# Patient Record
Sex: MALE | Race: WHITE | Employment: FULL TIME | URBAN - METROPOLITAN AREA
[De-identification: names, ages, dates, MRNs, and addresses within clinical notes are randomized per-mention and may not be internally consistent; named-entity substitution may affect disease eponyms.]

---

## 2017-03-07 DIAGNOSIS — I10 BENIGN ESSENTIAL HYPERTENSION: ICD-10-CM

## 2017-03-07 RX ORDER — METOPROLOL SUCCINATE 25 MG/1
50 TABLET, EXTENDED RELEASE ORAL DAILY
Qty: 180 TABLET | Refills: 3 | Status: CANCELLED | OUTPATIENT
Start: 2017-03-07

## 2017-03-08 NOTE — TELEPHONE ENCOUNTER
Clinic Action Needed: Yes, please address change in dosage of metoprolol XL to 50mg daily. Please send Rx with new dosage to [facility], [place]. Please notify wife when done.   FNA Triage Call  Presenting Problem: Wife states dosage of patient's metoprolol XL was increased in Fall 2016 by Dr. Corbett from 25mg daily to 50mg daily. However, new directions never were sent to pharmacy so Rx has continued to be filled as 25mg daily. Now patient is running out of med early and insurance company is denying refill saying he should not be out yet.   Guideline Used:  Patient Recommendations/Teaching: Unable to locate note containing order to increase metoprolol XL to 50mg daily so unable to change these directions on the Rx without provider order. Wife asked that someone from clinic call her on Wed 3/8 in morning at phone [phone].  Torri Browning RN/FNA'    Routed to: [facility]

## 2017-03-08 NOTE — TELEPHONE ENCOUNTER
Spouse calls. No NARESH/CTC on file.  Informed we may take a message but cannot discuss care with her.  We asked her to have Mike call us. He works as .     When pt returns call, please offer office visit.  Dr. Corbett expected OV 6 weeks after 7/12/16 visit. (I think Saloni LYNCH missed that pt had only been here for one appointment when she refilled last time)  We will also inform pt we cannot discuss plan of care with spouse without a signed consent.    Per Dr. Corbett:   7/12/16 visit  Note: - metoprolol (TOPROL-XL) 25 MG 24 hr tablet; Take 1 tablet (25 mg) by mouth daily Dispense: 30 tablet; Refill: 1   Instructions        Return in about 6 weeks (around 8/23/2016) for Routine Visit, BP Recheck.        Rob Velásquez, RN

## 2017-03-10 ENCOUNTER — ALLIED HEALTH/NURSE VISIT (OUTPATIENT)
Dept: NURSING | Facility: CLINIC | Age: 47
End: 2017-03-10
Payer: COMMERCIAL

## 2017-03-10 VITALS — SYSTOLIC BLOOD PRESSURE: 132 MMHG | DIASTOLIC BLOOD PRESSURE: 84 MMHG

## 2017-03-10 DIAGNOSIS — Z01.30 BLOOD PRESSURE CHECK: Primary | ICD-10-CM

## 2017-03-10 DIAGNOSIS — I10 BENIGN ESSENTIAL HYPERTENSION: ICD-10-CM

## 2017-03-10 PROCEDURE — 99207 ZZC NO CHARGE NURSE ONLY: CPT

## 2017-03-10 RX ORDER — METOPROLOL SUCCINATE 25 MG/1
25 TABLET, EXTENDED RELEASE ORAL DAILY
Qty: 90 TABLET | Refills: 0 | Status: SHIPPED | OUTPATIENT
Start: 2017-03-10 | End: 2017-03-20

## 2017-03-10 NOTE — TELEPHONE ENCOUNTER
Pt has been taking two metroprolol 25 mg tabs since 2 weeks after decreasing to 25.   Went back to 50 mg because of headaches on lower does.   Now out of pills and refill too soon.    OK 50 mg #30 t'd up?  He will follow up with pharmacy later tonight.   Rob Velásquez RN

## 2017-03-10 NOTE — TELEPHONE ENCOUNTER
Patient stopped in office to complete NARESH early today at .    Wife calling to checking on status.

## 2017-03-10 NOTE — TELEPHONE ENCOUNTER
metoprolol (TOPROL-XL) 25 MG 24 hr tablet    Patient and wife have both called numerous times to get this straigtned out.    Would like arefill at 50 mg as that is what he's been directed to take.    Will stop in to fill out NARESH so we can talk with his spouse.    She is calling now because he is at work and can't take calls.    He is now out of medications, this request was placed a few days ago.    Please process and call patient to confirm.

## 2017-03-10 NOTE — TELEPHONE ENCOUNTER
Patient is coming to office to fill out NARESH. He is out of medications and upset no one has called him yet about his refill.    Please LVM to confirm it's been sent to pharmacy.

## 2017-03-12 RX ORDER — METOPROLOL SUCCINATE 50 MG/1
50 TABLET, EXTENDED RELEASE ORAL DAILY
Qty: 30 TABLET | Refills: 0 | Status: SHIPPED | OUTPATIENT
Start: 2017-03-12 | End: 2017-03-20

## 2017-03-13 NOTE — TELEPHONE ENCOUNTER
Keron Corbett MD refilled  BP Readings from Last 2 Encounters:   03/10/17 132/84   09/05/16 130/80     Lab Results   Component Value Date    CR 0.82 07/12/2016     Lab Results   Component Value Date    POTASSIUM 4.1 07/12/2016     Prescription approved per [facility] Refill Protocol.  Esther Stoner RN, BSN

## 2017-03-20 ENCOUNTER — OFFICE VISIT (OUTPATIENT)
Dept: FAMILY MEDICINE | Facility: CLINIC | Age: 47
End: 2017-03-20
Payer: COMMERCIAL

## 2017-03-20 VITALS
TEMPERATURE: 98.2 F | WEIGHT: 311 LBS | HEIGHT: 68 IN | BODY MASS INDEX: 47.13 KG/M2 | RESPIRATION RATE: 10 BRPM | SYSTOLIC BLOOD PRESSURE: 134 MMHG | DIASTOLIC BLOOD PRESSURE: 83 MMHG | HEART RATE: 66 BPM

## 2017-03-20 DIAGNOSIS — I10 ESSENTIAL HYPERTENSION, BENIGN: Primary | ICD-10-CM

## 2017-03-20 DIAGNOSIS — E66.01 MORBID OBESITY DUE TO EXCESS CALORIES (H): ICD-10-CM

## 2017-03-20 PROCEDURE — 99214 OFFICE O/P EST MOD 30 MIN: CPT | Performed by: FAMILY MEDICINE

## 2017-03-20 RX ORDER — METOPROLOL SUCCINATE 50 MG/1
50 TABLET, EXTENDED RELEASE ORAL DAILY
Qty: 90 TABLET | Refills: 1 | Status: SHIPPED | OUTPATIENT
Start: 2017-03-20 | End: 2017-10-24

## 2017-03-20 NOTE — MR AVS SNAPSHOT
"              After Visit Summary   3/20/2017    Mike Schafer    MRN: [id number]           Patient Information     Date Of Birth          1970        Visit Information        Provider Department      3/20/2017 4:45 PM Keron Corbett MD [facility]        Today's Diagnoses     Essential hypertension, benign    -  1       Follow-ups after your visit        Your next 10 appointments already scheduled     Mar 20, 2017  4:45 PM CDT   SHORT with Keron Corbett MD   [facility] ([facility])    [address] [place] [id number] [phone]              Who to contact     If you have questions or need follow up information about today's clinic visit or your schedule please contact [facility] directly at [phone].  Normal or non-critical lab and imaging results will be communicated to you by MyChart, letter or phone within 4 business days after the clinic has received the results. If you do not hear from us within 7 days, please contact the clinic through MyChart or phone. If you have a critical or abnormal lab result, we will notify you by phone as soon as possible.  Submit refill requests through [facility] or call your pharmacy and they will forward the refill request to us. Please allow 3 business days for your refill to be completed.          Additional Information About Your Visit        MyChart Information     [facility] lets you send messages to your doctor, view your test results, renew your prescriptions, schedule appointments and more. To sign up, go to [email or web address] . Click on \"Log in\" on the left side of the screen, which will take you to the Welcome page. Then click on \"Sign up Now\" on the right side of the page.     You will be asked to enter the access code listed below, as well as some personal information. Please follow the directions to create your username and password.   " "  Your access code is: [email or web address]  Expires: 2017  3:01 PM     Your access code will  in 90 days. If you need help or a new code, please call your [facility] or [phone].        Care EveryWhere ID     This is your Care EveryWhere ID. This could be used by other organizations to access your [facility] medical records  [id number]        Your Vitals Were     Pulse Temperature Respirations Height BMI (Body Mass Index)       66 98.2  F (36.8  C) (Oral) 10 5' 8\" (1.727 m) 47.29 kg/m2        Blood Pressure from Last 3 Encounters:   17 134/83   03/10/17 132/84   16 130/80    Weight from Last 3 Encounters:   17 (!) 311 lb (141.1 kg)   16 (!) 305 lb 12.8 oz (138.7 kg)   16 (!) 316 lb (143.3 kg)              Today, you had the following     No orders found for display         Today's Medication Changes          These changes are accurate as of: 3/20/17  3:36 PM.  If you have any questions, ask your nurse or doctor.               These medicines have changed or have updated prescriptions.        Dose/Directions    metoprolol 50 MG 24 hr tablet   Commonly known as:  TOPROL-XL   This may have changed:  Another medication with the same name was removed. Continue taking this medication, and follow the directions you see here.   Used for:  Essential hypertension, benign   Changed by:  Keron Corbett MD        Dose:  50 mg   Take 1 tablet (50 mg) by mouth daily   Quantity:  90 tablet   Refills:  1            Where to get your medicines      These medications were sent to [id number] - [place] - [address]  [address] [address]     Phone:  [phone]     metoprolol 50 MG 24 hr tablet                Primary Care Provider    None Specified       No primary provider on file.        Thank you!     Thank you for choosing [facility]  for your care. Our goal is always to provide you with excellent care. Hearing back from our " patients is one way we can continue to improve our services. Please take a few minutes to complete the written survey that you may receive in the mail after your visit with us. Thank you!             Your Updated Medication List - Protect others around you: Learn how to safely use, store and throw away your medicines at www.disposemymeds.org.          This list is accurate as of: 3/20/17  3:36 PM.  Always use your most recent med list.                   Brand Name Dispense Instructions for use    albuterol 108 (90 BASE) MCG/ACT Inhaler    PROAIR HFA/PROVENTIL HFA/VENTOLIN HFA    1 Inhaler    Inhale 1-2 puffs into the lungs every 4 hours as needed for shortness of breath / dyspnea or wheezing       benzonatate 200 MG capsule    TESSALON    21 capsule    Take 1 capsule (200 mg) by mouth 3 times daily as needed for cough       metoprolol 50 MG 24 hr tablet    TOPROL-XL    90 tablet    Take 1 tablet (50 mg) by mouth daily

## 2017-03-20 NOTE — PROGRESS NOTES
"  SUBJECTIVE:                                                    Mike Schafer is a 46 year old male who presents to clinic today for the following health issues:      Medication Followup of metoprolol    Taking Medication as prescribed: yes    Side Effects:  None    Medication Helping Symptoms:  yes     Subjective:   Mike Schafer is a 46 year old male with hypertension.  Current Outpatient Prescriptions   Medication Sig Dispense Refill     metoprolol (TOPROL-XL) 50 MG 24 hr tablet Take 1 tablet (50 mg) by mouth daily 90 tablet 1     albuterol (PROAIR HFA, PROVENTIL HFA, VENTOLIN HFA) 108 (90 BASE) MCG/ACT inhaler Inhale 1-2 puffs into the lungs every 4 hours as needed for shortness of breath / dyspnea or wheezing 1 Inhaler 0     benzonatate (TESSALON) 200 MG capsule Take 1 capsule (200 mg) by mouth 3 times daily as needed for cough 21 capsule 0     [DISCONTINUED] metoprolol (TOPROL-XL) 50 MG 24 hr tablet Take 1 tablet (50 mg) by mouth daily 30 tablet 0     [DISCONTINUED] metoprolol (TOPROL-XL) 25 MG 24 hr tablet Take 1 tablet (25 mg) by mouth daily 90 tablet 0      Hypertension ROS: taking medications as instructed, no medication side effects noted, no TIA's, no chest pain on exertion, no dyspnea on exertion, no swelling of ankles.   New concerns: didn't do well on reduced metoprolol: went back to 50 mg.      REVIEW OF SYSTEMS    Generally has been otherwise feeling well until this episode. No problems with vision, hearing, dental or neck pain.Has hph airborne or ingestion allergy  No chest pain, palpitations, dyspnea, change in bowel habits, blood  in stool or dyspepsia.  No rashes, changing moles, weakness, lassitude or back problems.  No chronic issues . No dysuria  Patient not  a smoker. No problems with significant headaches.  Objective:   /83 (BP Location: Right arm, Patient Position: Chair, Cuff Size: Adult Large)  Pulse 66  Temp 98.2  F (36.8  C) (Oral)  Resp 10  Ht 5' 8\" (1.727 m)  Wt (!) " 311 lb (141.1 kg)  BMI 47.29 kg/m2   Appearance healthy, alert and cooperative.  General exam BP noted to be well controlled today in office, S1, S2 normal, no gallop, no murmur, chest clear, no JVD, no HSM, no edema.   Lab review: labs are reviewed, up to date and normal.     Assessment:    Hypertension well controlled.   Weight management plan: Specific weight management program called carbo reduction discussed and follow up in 3 months in clinic to re-evaluate.    (I10) Essential hypertension, benign  (primary encounter diagnosis)  Comment:   Plan: metoprolol (TOPROL-XL) 50 MG 24 hr tablet            (E66.01) Morbid obesity due to excess calories (H)  Comment:   Plan: the key to longterm bp reduction      Plan:   reviewed diet, exercise and weight control, recommended sodium restriction, cardiovascular risk and specific lipid/LDL goals reviewed.  Goal is to reduce 20 pounds over 4-6 months

## 2017-03-31 ENCOUNTER — OFFICE VISIT (OUTPATIENT)
Dept: FAMILY MEDICINE | Facility: CLINIC | Age: 47
End: 2017-03-31
Payer: COMMERCIAL

## 2017-03-31 VITALS
HEIGHT: 68 IN | RESPIRATION RATE: 12 BRPM | HEART RATE: 57 BPM | OXYGEN SATURATION: 96 % | TEMPERATURE: 98 F | DIASTOLIC BLOOD PRESSURE: 88 MMHG | WEIGHT: 307.9 LBS | SYSTOLIC BLOOD PRESSURE: 134 MMHG | BODY MASS INDEX: 46.66 KG/M2

## 2017-03-31 DIAGNOSIS — R10.13 ABDOMINAL PAIN, EPIGASTRIC: Primary | ICD-10-CM

## 2017-03-31 LAB
ALBUMIN UR-MCNC: ABNORMAL MG/DL
APPEARANCE UR: CLEAR
BACTERIA #/AREA URNS HPF: ABNORMAL /HPF
BASOPHILS # BLD AUTO: 0.1 10E9/L (ref 0–0.2)
BASOPHILS NFR BLD AUTO: 0.6 %
BILIRUB UR QL STRIP: ABNORMAL
COLOR UR AUTO: ABNORMAL
DIFFERENTIAL METHOD BLD: NORMAL
EOSINOPHIL # BLD AUTO: 0.2 10E9/L (ref 0–0.7)
EOSINOPHIL NFR BLD AUTO: 2.1 %
ERYTHROCYTE [DISTWIDTH] IN BLOOD BY AUTOMATED COUNT: 13.3 % (ref 10–15)
GLUCOSE UR STRIP-MCNC: NEGATIVE MG/DL
HCT VFR BLD AUTO: 42.3 % (ref 40–53)
HGB BLD-MCNC: 14.5 G/DL (ref 13.3–17.7)
HGB UR QL STRIP: NEGATIVE
KETONES UR STRIP-MCNC: NEGATIVE MG/DL
LEUKOCYTE ESTERASE UR QL STRIP: NEGATIVE
LYMPHOCYTES # BLD AUTO: 2.6 10E9/L (ref 0.8–5.3)
LYMPHOCYTES NFR BLD AUTO: 29.5 %
MCH RBC QN AUTO: 30 PG (ref 26.5–33)
MCHC RBC AUTO-ENTMCNC: 34.3 G/DL (ref 31.5–36.5)
MCV RBC AUTO: 87 FL (ref 78–100)
MONOCYTES # BLD AUTO: 0.6 10E9/L (ref 0–1.3)
MONOCYTES NFR BLD AUTO: 6.7 %
MUCOUS THREADS #/AREA URNS LPF: PRESENT /LPF
NEUTROPHILS # BLD AUTO: 5.3 10E9/L (ref 1.6–8.3)
NEUTROPHILS NFR BLD AUTO: 61.1 %
NITRATE UR QL: NEGATIVE
NON-SQ EPI CELLS #/AREA URNS LPF: ABNORMAL /LPF
PH UR STRIP: 5 PH (ref 5–7)
PLATELET # BLD AUTO: 293 10E9/L (ref 150–450)
RBC # BLD AUTO: 4.84 10E12/L (ref 4.4–5.9)
RBC #/AREA URNS AUTO: ABNORMAL /HPF (ref 0–2)
SP GR UR STRIP: >1.03 (ref 1–1.03)
URN SPEC COLLECT METH UR: ABNORMAL
UROBILINOGEN UR STRIP-ACNC: 1 EU/DL (ref 0.2–1)
WBC # BLD AUTO: 8.6 10E9/L (ref 4–11)
WBC #/AREA URNS AUTO: ABNORMAL /HPF (ref 0–2)

## 2017-03-31 PROCEDURE — 81001 URINALYSIS AUTO W/SCOPE: CPT | Performed by: FAMILY MEDICINE

## 2017-03-31 PROCEDURE — 85025 COMPLETE CBC W/AUTO DIFF WBC: CPT | Performed by: FAMILY MEDICINE

## 2017-03-31 PROCEDURE — 36415 COLL VENOUS BLD VENIPUNCTURE: CPT | Performed by: FAMILY MEDICINE

## 2017-03-31 PROCEDURE — 99214 OFFICE O/P EST MOD 30 MIN: CPT | Performed by: FAMILY MEDICINE

## 2017-03-31 NOTE — NURSING NOTE
"Chief Complaint   Patient presents with     Abdominal Pain       Initial BP (!) 134/93 (BP Location: Right arm, Patient Position: Chair, Cuff Size: Adult Large)  Pulse 57  Temp 98  F (36.7  C) (Oral)  Resp 12  Ht 5' 8\" (1.727 m)  Wt (!) 307 lb 14.4 oz (139.7 kg)  SpO2 96%  BMI 46.82 kg/m2 Estimated body mass index is 46.82 kg/(m^2) as calculated from the following:    Height as of this encounter: 5' 8\" (1.727 m).    Weight as of this encounter: 307 lb 14.4 oz (139.7 kg).  Medication Reconciliation: complete   Gregg Alicia CMA       "

## 2017-03-31 NOTE — PROGRESS NOTES
SUBJECTIVE:                                                    Mike Schafer is a 46 year old male who presents to clinic today for the following health issues:      Abdominal Pain      Duration: 6 days    Description (location/character/radiation): upper stomach pain       Associated flank pain: yes    Intensity:  2-5/10    Accompanying signs and symptoms:        Fever/Chills: no        Gas/Bloating: YES       Nausea/vomitting: no        Diarrhea: no        Dysuria or Hematuria: no     History (previous similar pain/trauma/previous testing): no    Precipitating or alleviating factors:       Pain worse with eating/BM/urination: for 2 days eating made it worse       Pain relieved by BM: no     Therapies tried and outcome: antacid    LMP:  not applicable     Past Medical History:   Diagnosis Date     Hypertension        History reviewed. No pertinent surgical history.    Family History   Problem Relation Age of Onset     Breast Cancer Mother      DIABETES Father      Other Cancer Maternal Grandmother      Other Cancer Paternal Grandfather        Social History   Substance Use Topics     Smoking status: Never Smoker     Smokeless tobacco: Not on file     Alcohol use 0.0 oz/week     0 Standard drinks or equivalent per week        REVIEW OF SYSTEMS    Generally has been previously feeling well until this episode. No problems with vision, hearing, dental or neck pain.Has hph airborne or ingestion allergy  No chest pain, palpitations, dyspnea, change in bowel habits, blood  in stool or dyspepsia.  No rashes, changing moles, weakness, lassitude or back problems.  No chronic issues . No dysuria  Patient not  a smoker. No problems with significant headaches.  On exam the vital signs are stable  Weight is Body mass index is 46.82 kg/(m^2).   Eyes show jessie  No neck masses or thyromegaly.Ear nose and throat shows normal   No bruits, murmers, rubs or extrasounds. No cardiomegaly or chest wall tenderness. Lungs clear, no  abdominal masses or organomegaly. No CVA tenderness.  Skin eval no rash, tender in right epigastrium, no guarding  No hernias, good range of motion neck, back and extremities. No abnormal skin lesions. Normal genitalia. Good peripheral pulses. No adenopathy.  Normal gait and stance. Neck is supple.  Back exam shows good rom     (R10.13) Abdominal pain, epigastric  (primary encounter diagnosis)  Comment: Plan: US Abdomen Limited, CBC with platelets and         differential, *UA reflex to Microscopic and         Culture ([facility] (except         [place] and [place]), Urine Microscopic        Viral vs choledocal  Rule out cholelithiasis

## 2017-03-31 NOTE — MR AVS SNAPSHOT
"              After Visit Summary   3/31/2017    Mike Schafer    MRN: [id number]           Patient Information     Date Of Birth          1970        Visit Information        Provider Department      3/31/2017 4:30 PM Keron Corbett MD [facility]        Today's Diagnoses     Abdominal pain, epigastric    -  1       Follow-ups after your visit        Future tests that were ordered for you today     Open Future Orders        Priority Expected Expires Ordered    US Abdomen Limited Routine  3/31/2018 3/31/2017            Who to contact     If you have questions or need follow up information about today's clinic visit or your schedule please contact [facility] directly at [phone].  Normal or non-critical lab and imaging results will be communicated to you by MyChart, letter or phone within 4 business days after the clinic has received the results. If you do not hear from us within 7 days, please contact the clinic through MyChart or phone. If you have a critical or abnormal lab result, we will notify you by phone as soon as possible.  Submit refill requests through [facility] or call your pharmacy and they will forward the refill request to us. Please allow 3 business days for your refill to be completed.          Additional Information About Your Visit        MyChart Information     [facility] lets you send messages to your doctor, view your test results, renew your prescriptions, schedule appointments and more. To sign up, go to [email or web address] . Click on \"Log in\" on the left side of the screen, which will take you to the Welcome page. Then click on \"Sign up Now\" on the right side of the page.     You will be asked to enter the access code listed below, as well as some personal information. Please follow the directions to create your username and password.     Your access code is: [email or web address]  Expires: 2017  3:01 PM     Your access code will  in " "90 days. If you need help or a new code, please call your [facility] or [phone].        Care EveryWhere ID     This is your Care EveryWhere ID. This could be used by other organizations to access your [facility] medical records  [id number]        Your Vitals Were     Pulse Temperature Respirations Height Pulse Oximetry BMI (Body Mass Index)    57 98  F (36.7  C) (Oral) 12 5' 8\" (1.727 m) 96% 46.82 kg/m2       Blood Pressure from Last 3 Encounters:   03/31/17 134/88   03/20/17 134/83   03/10/17 132/84    Weight from Last 3 Encounters:   03/31/17 (!) 307 lb 14.4 oz (139.7 kg)   03/20/17 (!) 311 lb (141.1 kg)   09/05/16 (!) 305 lb 12.8 oz (138.7 kg)              We Performed the Following     *UA reflex to Microscopic and Culture ([place] [facility] (except [place] and [place])     CBC with platelets and differential        Primary Care Provider Office Phone # Fax #    Keron Corbett [phone]       [facility] [id number] [place] [address]        Thank you!     Thank you for choosing [facility]  for your care. Our goal is always to provide you with excellent care. Hearing back from our patients is one way we can continue to improve our services. Please take a few minutes to complete the written survey that you may receive in the mail after your visit with us. Thank you!             Your Updated Medication List - Protect others around you: Learn how to safely use, store and throw away your medicines at www.disposemymeds.org.          This list is accurate as of: 3/31/17  5:20 PM.  Always use your most recent med list.                   Brand Name Dispense Instructions for use    albuterol 108 (90 BASE) MCG/ACT Inhaler    PROAIR HFA/PROVENTIL HFA/VENTOLIN HFA    1 Inhaler    Inhale 1-2 puffs into the lungs every 4 hours as needed for shortness of breath / dyspnea or wheezing       benzonatate 200 MG capsule    TESSALON    21 " capsule    Take 1 capsule (200 mg) by mouth 3 times daily as needed for cough       metoprolol 50 MG 24 hr tablet    TOPROL-XL    90 tablet    Take 1 tablet (50 mg) by mouth daily

## 2017-04-14 ENCOUNTER — HOSPITAL ENCOUNTER (OUTPATIENT)
Dept: ULTRASOUND IMAGING | Facility: CLINIC | Age: 47
Discharge: HOME | End: 2017-04-14
Attending: FAMILY MEDICINE | Admitting: FAMILY MEDICINE
Payer: COMMERCIAL

## 2017-04-14 DIAGNOSIS — R10.13 ABDOMINAL PAIN, EPIGASTRIC: ICD-10-CM

## 2017-04-14 PROCEDURE — 76705 ECHO EXAM OF ABDOMEN: CPT

## 2017-04-20 ENCOUNTER — TELEPHONE (OUTPATIENT)
Dept: FAMILY MEDICINE | Facility: CLINIC | Age: 47
End: 2017-04-20

## 2017-04-20 NOTE — TELEPHONE ENCOUNTER
Spouse call for 4/14/17 imaging results.  NARESH/CTC on file.   Informed.  Spouse reports no change in symptoms. 3/31/17 Visit note reviewed:   Instructions        Return in about 1 week (around 4/7/2017), or if symptoms worsen or fail to improve.      Patient's first available due to work schedule after 3:00 PM Fridays. Scheduled 4/28/17 4:00 PM.   Rob Velásquez RN

## 2017-04-28 ENCOUNTER — OFFICE VISIT (OUTPATIENT)
Dept: FAMILY MEDICINE | Facility: CLINIC | Age: 47
End: 2017-04-28
Payer: COMMERCIAL

## 2017-04-28 VITALS
HEIGHT: 68 IN | OXYGEN SATURATION: 97 % | HEART RATE: 67 BPM | TEMPERATURE: 98.1 F | WEIGHT: 313.4 LBS | DIASTOLIC BLOOD PRESSURE: 84 MMHG | BODY MASS INDEX: 47.5 KG/M2 | SYSTOLIC BLOOD PRESSURE: 138 MMHG | RESPIRATION RATE: 14 BRPM

## 2017-04-28 DIAGNOSIS — I10 BENIGN ESSENTIAL HYPERTENSION: Primary | ICD-10-CM

## 2017-04-28 PROCEDURE — 36415 COLL VENOUS BLD VENIPUNCTURE: CPT | Performed by: FAMILY MEDICINE

## 2017-04-28 PROCEDURE — 80048 BASIC METABOLIC PNL TOTAL CA: CPT | Performed by: FAMILY MEDICINE

## 2017-04-28 PROCEDURE — 99213 OFFICE O/P EST LOW 20 MIN: CPT | Performed by: FAMILY MEDICINE

## 2017-04-28 NOTE — LETTER
[facility]  [address] [place], [place], [address]  [phone]      May 1, 2017    Mike Schafer   [phone]  [facility] [address]          Dear Mike,    The results of your recent tests were good.  Enclosed is a copy of the results.  It was a pleasure to see you at your last appointment.    Results for orders placed or performed in visit on 04/28/17   Basic metabolic panel  (Ca, Cl, CO2, Creat, Gluc, K, Na, BUN)   Result Value Ref Range    Sodium 140 133 - 144 mmol/L    Potassium 4.0 3.4 - 5.3 mmol/L    Chloride 106 94 - 109 mmol/L    Carbon Dioxide 28 20 - 32 mmol/L    Anion Gap 6 3 - 14 mmol/L    Glucose 80 70 - 99 mg/dL    Urea Nitrogen 13 7 - 30 mg/dL    Creatinine 0.80 0.66 - 1.25 mg/dL    GFR Estimate >90  Non  GFR Calc   >60 mL/min/1.7m2    GFR Estimate If Black >90   GFR Calc   >60 mL/min/1.7m2    Calcium 9.0 8.5 - 10.1 mg/dL       If you have any questions or concerns, please call myself or my nurse at [phone].          Sincerely,    Keron Corbett MD/MAURO

## 2017-04-28 NOTE — NURSING NOTE
"Chief Complaint   Patient presents with     Abdominal Pain       Initial /84 (BP Location: Left arm, Patient Position: Chair, Cuff Size: Adult Large)  Pulse 67  Temp 98.1  F (36.7  C) (Oral)  Resp 14  Ht 5' 8\" (1.727 m)  Wt (!) 313 lb 6.4 oz (142.2 kg)  SpO2 97%  BMI 47.65 kg/m2 Estimated body mass index is 47.65 kg/(m^2) as calculated from the following:    Height as of this encounter: 5' 8\" (1.727 m).    Weight as of this encounter: 313 lb 6.4 oz (142.2 kg).  Medication Reconciliation: complete   Gregg Alicia CMA       "

## 2017-04-28 NOTE — MR AVS SNAPSHOT
"              After Visit Summary   2017    Mike Schafer    MRN: [id number]           Patient Information     Date Of Birth          1970        Visit Information        Provider Department      2017 4:00 PM Keron Corbett MD [facility]        Today's Diagnoses     Benign essential hypertension    -  1       Follow-ups after your visit        Who to contact     If you have questions or need follow up information about today's clinic visit or your schedule please contact [facility] directly at [phone].  Normal or non-critical lab and imaging results will be communicated to you by MyChart, letter or phone within 4 business days after the clinic has received the results. If you do not hear from us within 7 days, please contact the clinic through [employer] or phone. If you have a critical or abnormal lab result, we will notify you by phone as soon as possible.  Submit refill requests through "[employer]" or call your pharmacy and they will forward the refill request to us. Please allow 3 business days for your refill to be completed.          Additional Information About Your Visit        MyChart Information     "[employer]" lets you send messages to your doctor, view your test results, renew your prescriptions, schedule appointments and more. To sign up, go to [email or web address]" . Click on \"Log in\" on the left side of the screen, which will take you to the Welcome page. Then click on \"Sign up Now\" on the right side of the page.     You will be asked to enter the access code listed below, as well as some personal information. Please follow the directions to create your username and password.     Your access code is: [email or web address]  Expires: 2017  3:01 PM     Your access code will  in 90 days. If you need help or a new code, please call your [facility] or [phone].        Care EveryWhere ID     This is your Care EveryWhere ID. This could be " "used by other organizations to access your [place] medical records  [id number]        Your Vitals Were     Pulse Temperature Respirations Height Pulse Oximetry BMI (Body Mass Index)    67 98.1  F (36.7  C) (Oral) 14 5' 8\" (1.727 m) 97% 47.65 kg/m2       Blood Pressure from Last 3 Encounters:   04/28/17 140/84   03/31/17 134/88   03/20/17 134/83    Weight from Last 3 Encounters:   04/28/17 (!) 313 lb 6.4 oz (142.2 kg)   03/31/17 (!) 307 lb 14.4 oz (139.7 kg)   03/20/17 (!) 311 lb (141.1 kg)              We Performed the Following     Basic metabolic panel  (Ca, Cl, CO2, Creat, Gluc, K, Na, BUN)        Primary Care Provider Office Phone # Fax #    Keron Corbett [phone]       [address] [address]        Thank you!     Thank you for choosing [facility]  for your care. Our goal is always to provide you with excellent care. Hearing back from our patients is one way we can continue to improve our services. Please take a few minutes to complete the written survey that you may receive in the mail after your visit with us. Thank you!             Your Updated Medication List - Protect others around you: Learn how to safely use, store and throw away your medicines at www.disposemymeds.org.          This list is accurate as of: 4/28/17  4:17 PM.  Always use your most recent med list.                   Brand Name Dispense Instructions for use    metoprolol 50 MG 24 hr tablet    TOPROL-XL    90 tablet    Take 1 tablet (50 mg) by mouth daily         "

## 2017-04-28 NOTE — PROGRESS NOTES
"  SUBJECTIVE:                                                    Mike Schafer is a 46 year old male who presents to clinic today for the following health issues:    /84 (BP Location: Left arm, Patient Position: Chair, Cuff Size: Adult Large)  Pulse 67  Temp 98.1  F (36.7  C) (Oral)  Resp 14  Ht 5' 8\" (1.727 m)  Wt (!) 313 lb 6.4 oz (142.2 kg)  SpO2 97%  BMI 47.65 kg/m2    Abdominal Pain      Duration: 3/17/17    Description (location/character/radiation): upper abdomen        Associated flank pain: yes    Intensity:  mild    Accompanying signs and symptoms:        Fever/Chills: no        Gas/Bloating: no        Nausea/vomitting: no        Diarrhea: no        Dysuria or Hematuria: no     History (previous similar pain/trauma/previous testing): no    Precipitating or alleviating factors:       Pain worse with eating/BM/urination: no       Pain relieved by BM: no     Therapies tried and outcome: None    LMP:  not applicable     Past Medical History:   Diagnosis Date     Hypertension        History reviewed. No pertinent surgical history.    Family History   Problem Relation Age of Onset     Breast Cancer Mother      DIABETES Father      Other Cancer Maternal Grandmother      Other Cancer Paternal Grandfather        Social History   Substance Use Topics     Smoking status: Never Smoker     Smokeless tobacco: Not on file     Alcohol use 0.0 oz/week     0 Standard drinks or equivalent per week   On exam the vital signs are stable  Weight is Body mass index is 47.65 kg/(m^2).   Eyes show jessie  No neck masses or thyromegaly.Ear nose and throat shows normal   No bruits, murmers, rubs or extrasounds. No cardiomegaly or chest wall tenderness. Lungs clear, no abdominal masses or organomegaly. No CVA tenderness.  Skin eval no rash   No hernias, good range of motion neck, back and extremities. No abnormal skin lesions. Normal genitalia. Good peripheral pulses. No adenopathy.  Normal gait and stance. Neck is " supple.  Back exam shows good rom , no abdominal tenderness    (I10) Benign essential hypertension  (primary encounter diagnosis)  Comment:   Plan: Basic metabolic panel  (Ca, Cl, CO2, Creat,         Gluc, K, Na, BUN)          Possible hydronephrosis, recheck after 6 weeks if renal function is normal

## 2017-04-29 LAB
ANION GAP SERPL CALCULATED.3IONS-SCNC: 6 MMOL/L (ref 3–14)
BUN SERPL-MCNC: 13 MG/DL (ref 7–30)
CALCIUM SERPL-MCNC: 9 MG/DL (ref 8.5–10.1)
CHLORIDE SERPL-SCNC: 106 MMOL/L (ref 94–109)
CO2 SERPL-SCNC: 28 MMOL/L (ref 20–32)
CREAT SERPL-MCNC: 0.8 MG/DL (ref 0.66–1.25)
GFR SERPL CREATININE-BSD FRML MDRD: NORMAL ML/MIN/1.7M2
GLUCOSE SERPL-MCNC: 80 MG/DL (ref 70–99)
POTASSIUM SERPL-SCNC: 4 MMOL/L (ref 3.4–5.3)
SODIUM SERPL-SCNC: 140 MMOL/L (ref 133–144)

## 2017-10-24 ENCOUNTER — OFFICE VISIT (OUTPATIENT)
Dept: FAMILY MEDICINE | Facility: CLINIC | Age: 47
End: 2017-10-24
Payer: COMMERCIAL

## 2017-10-24 VITALS
RESPIRATION RATE: 20 BRPM | DIASTOLIC BLOOD PRESSURE: 82 MMHG | OXYGEN SATURATION: 97 % | WEIGHT: 315 LBS | SYSTOLIC BLOOD PRESSURE: 133 MMHG | TEMPERATURE: 98 F | BODY MASS INDEX: 47.74 KG/M2 | HEIGHT: 68 IN | HEART RATE: 69 BPM

## 2017-10-24 DIAGNOSIS — I10 ESSENTIAL HYPERTENSION, BENIGN: ICD-10-CM

## 2017-10-24 DIAGNOSIS — H81.10 BENIGN PAROXYSMAL POSITIONAL VERTIGO, UNSPECIFIED LATERALITY: Primary | ICD-10-CM

## 2017-10-24 DIAGNOSIS — H61.23 BILATERAL IMPACTED CERUMEN: ICD-10-CM

## 2017-10-24 PROCEDURE — 99214 OFFICE O/P EST MOD 30 MIN: CPT | Performed by: FAMILY MEDICINE

## 2017-10-24 PROCEDURE — 69209 REMOVE IMPACTED EAR WAX UNI: CPT | Mod: 50 | Performed by: FAMILY MEDICINE

## 2017-10-24 RX ORDER — MECLIZINE HYDROCHLORIDE 25 MG/1
25 TABLET ORAL EVERY 6 HOURS PRN
Qty: 30 TABLET | Refills: 0 | Status: SHIPPED | OUTPATIENT
Start: 2017-10-24 | End: 2019-04-12

## 2017-10-24 RX ORDER — METOPROLOL SUCCINATE 50 MG/1
50 TABLET, EXTENDED RELEASE ORAL DAILY
Qty: 90 TABLET | Refills: 1 | Status: SHIPPED | OUTPATIENT
Start: 2017-10-24 | End: 2018-05-23

## 2017-10-24 NOTE — NURSING NOTE
"Chief Complaint   Patient presents with     Dizziness     vertigo since yesterday also left hearing loss       Initial /82 (BP Location: Right arm, Patient Position: Chair, Cuff Size: Adult Large)  Pulse 69  Temp 98  F (36.7  C) (Oral)  Resp 20  Ht 5' 8\" (1.727 m)  Wt (!) 317 lb (143.8 kg)  SpO2 97%  BMI 48.2 kg/m2 Estimated body mass index is 48.2 kg/(m^2) as calculated from the following:    Height as of this encounter: 5' 8\" (1.727 m).    Weight as of this encounter: 317 lb (143.8 kg).  Medication Reconciliation: complete     Poly Cook/ROLY  [facility]      "

## 2017-10-24 NOTE — MR AVS SNAPSHOT
After Visit Summary   10/24/2017    Mike Schafer    MRN: [id number]           Patient Information     Date Of Birth          1970        Visit Information        Provider Department      10/24/2017 4:00 PM Jaylin Flores MD [facility]        Today's Diagnoses     Benign paroxysmal positional vertigo, unspecified laterality    -  1    Essential hypertension, benign          Care Instructions      Follow up if symptoms persist or worsen   Benign Paroxysmal Positional Vertigo    Benign paroxysmal positional vertigo is a common condition. You feel as if the room is spinning after changing position, moving your head quickly, or even just rolling over in bed.  Vertigo is a false feeling of motion plus disorientation that makes it seem as though the room is spinning. A vertigo attack may cause sudden nausea, vomiting, and heavy sweating. Severe vertigo causes a loss of balance. You may even fall down.  Vertigo is caused by a problem with the inner ear. The inner ear is located behind the middle ear. It is a part of the balance center of the body. It contains small calcium particles within fluid-filled canals (semi-circular canals). These particles can move out of position as a result of aging, head trauma, or disease of the inner ear. Once that happens, moving your head in certain ways may cause the particles to stimulate the inner ear. This creates the feeling of vertigo.  An episode of vertigo may last seconds, minutes, or hours. Once you are over the first episode of vertigo, it may never return. Sometimes symptoms return off and on over several weeks or longer.  Home care  Follow these guidelines when caring for yourself at home:    Rest quietly in bed if your symptoms are severe. Change position slowly. There is usually one position that will feel best. This might be lying on one side or lying on your back with your head slightly raised on pillows.    Do not  drive or work with dangerous machinery for one week after symptoms disappear. This is in case symptoms return suddenly.    Take medicine as prescribed to relieve your symptoms. Unless another medicine was prescribed for nausea, vomiting, and vertigo, you may use over-the-counter motion sickness medicine, such as meclizine or dimenhydrinate.  Follow-up care  Follow up with your healthcare provider, or as directed. Tell your provider about any ringing in the ear or hearing loss.  If you had a CT or MRI scan, a specialist will review it. You will be told of any new findings that may affect your care.  When to seek medical advice  Call your healthcare provider right away if any of these occur:    Vertigo gets worse even after taking prescribed medicine    Repeated vomiting even after taking prescribed medicine    Increased weakness or fainting    Severe headache or unusual drowsiness or confusion    Weakness of an arm or leg or one side of the face    Difficulty walking    Difficulty with speech or vision    Seizure    Trouble hearing    Fever of 100.4 F (38 C) or higher, or as directed by your healthcare provider    Fast heart rate    Chest pain   Date Last Reviewed: 7/10/2015    3788-1058 [employer]. [address] [place] [address]. All rights reserved. This information is not intended as a substitute for professional medical care. Always follow your healthcare professional's instructions.                Follow-ups after your visit        Additional Services     PHYSICAL THERAPY REFERRAL       *This therapy referral will be filtered to a centralized scheduling office at [facility] and the patient will receive a call to schedule an appointment at a [facility] location most convenient for them. *     [facility] provides Physical Therapy evaluation and treatment and many specialty services across the [facility] system.  If requesting a specialty program,  "please choose from the list below.    If you have not heard from the scheduling office within 2 business days, please call [phone] for all locations, with the exception of [place], please call [phone].  Treatment: Evaluation & Treatment  Special Instructions/Modalities:  Special Programs: Balance/Vestibular    Please be aware that coverage of these services is subject to the terms and limitations of your health insurance plan.  Call member services at your health plan with any benefit or coverage questions.      **Note to Provider:  If you are referring outside of [facility] for the therapy appointment, please list the name of the location in the \"special instructions\" above, print the referral and give to the patient to schedule the appointment.                  Who to contact     If you have questions or need follow up information about today's clinic visit or your schedule please contact [facility] directly at [phone].  Normal or non-critical lab and imaging results will be communicated to you by MyChart, letter or phone within 4 business days after the clinic has received the results. If you do not hear from us within 7 days, please contact the clinic through MyChart or phone. If you have a critical or abnormal lab result, we will notify you by phone as soon as possible.  Submit refill requests through [email or web address] or call your pharmacy and they will forward the refill request to us. Please allow 3 business days for your refill to be completed.          Additional Information About Your Visit        [employer] Information     [email or web address] lets you send messages to your doctor, view your test results, renew your prescriptions, schedule appointments and more. To sign up, go to [email or web address] . Click on \"Log in\" on the left side of the screen, which will take you to the Welcome page. Then click on \"Sign up Now\" on the right side of the page.     You will be asked to enter the access code " "listed below, as well as some personal information. Please follow the directions to create your username and password.     Your access code is: [email or web address]  Expires: 2018  4:48 PM     Your access code will  in 90 days. If you need help or a new code, please call your [facility] or [phone].        Care EveryWhere ID     This is your Care EveryWhere ID. This could be used by other organizations to access your [place] medical records  [id number]        Your Vitals Were     Pulse Temperature Respirations Height Pulse Oximetry BMI (Body Mass Index)    69 98  F (36.7  C) (Oral) 20 5' 8\" (1.727 m) 97% 48.2 kg/m2       Blood Pressure from Last 3 Encounters:   10/24/17 133/82   17 138/84   17 134/88    Weight from Last 3 Encounters:   10/24/17 (!) 317 lb (143.8 kg)   17 (!) 313 lb 6.4 oz (142.2 kg)   17 (!) 307 lb 14.4 oz (139.7 kg)              We Performed the Following     PHYSICAL THERAPY REFERRAL          Today's Medication Changes          These changes are accurate as of: 10/24/17  4:49 PM.  If you have any questions, ask your nurse or doctor.               Start taking these medicines.        Dose/Directions    meclizine 25 MG tablet   Commonly known as:  ANTIVERT   Used for:  Benign paroxysmal positional vertigo, unspecified laterality   Started by:  Jaylin Flores MD        Dose:  25 mg   Take 1 tablet (25 mg) by mouth every 6 hours as needed for dizziness   Quantity:  30 tablet   Refills:  0            Where to get your medicines      These medications were sent to [id number] - [place], [place] - [address]  [address] [facility] [address]     Phone:  [phone]     meclizine 25 MG tablet    metoprolol 50 MG 24 hr tablet                Primary Care Provider Office Phone # Fax #    Keron Corbett [phone] [address] [facility] [address]        Equal Access to Services     ELISA MALAGON AH: Raven jensen " rashida Saucedo, watanda mandodc, qatiffanieta kagertrudis callahan, isidra theresain hayaaradha steenlawanda anayelikristen laalradha lucien. So [facility] [phone].    ATENCIÓN: Si habla español, tiene a orantes disposición servicios gratuitos de asistencia lingüística. Sivan al [phone].    We comply with applicable federal civil rights laws and [place] laws. We do not discriminate on the basis of race, color, national origin, age, disability, sex, sexual orientation, or gender identity.            Thank you!     Thank you for choosing [facility]  for your care. Our goal is always to provide you with excellent care. Hearing back from our patients is one way we can continue to improve our services. Please take a few minutes to complete the written survey that you may receive in the mail after your visit with us. Thank you!             Your Updated Medication List - Protect others around you: Learn how to safely use, store and throw away your medicines at www.disposemymeds.org.          This list is accurate as of: 10/24/17  4:49 PM.  Always use your most recent med list.                   Brand Name Dispense Instructions for use Diagnosis    meclizine 25 MG tablet    ANTIVERT    30 tablet    Take 1 tablet (25 mg) by mouth every 6 hours as needed for dizziness    Benign paroxysmal positional vertigo, unspecified laterality       metoprolol 50 MG 24 hr tablet    TOPROL-XL    90 tablet    Take 1 tablet (50 mg) by mouth daily    Essential hypertension, benign

## 2017-10-24 NOTE — PATIENT INSTRUCTIONS
Follow up if symptoms persist or worsen   Benign Paroxysmal Positional Vertigo    Benign paroxysmal positional vertigo is a common condition. You feel as if the room is spinning after changing position, moving your head quickly, or even just rolling over in bed.  Vertigo is a false feeling of motion plus disorientation that makes it seem as though the room is spinning. A vertigo attack may cause sudden nausea, vomiting, and heavy sweating. Severe vertigo causes a loss of balance. You may even fall down.  Vertigo is caused by a problem with the inner ear. The inner ear is located behind the middle ear. It is a part of the balance center of the body. It contains small calcium particles within fluid-filled canals (semi-circular canals). These particles can move out of position as a result of aging, head trauma, or disease of the inner ear. Once that happens, moving your head in certain ways may cause the particles to stimulate the inner ear. This creates the feeling of vertigo.  An episode of vertigo may last seconds, minutes, or hours. Once you are over the first episode of vertigo, it may never return. Sometimes symptoms return off and on over several weeks or longer.  Home care  Follow these guidelines when caring for yourself at home:    Rest quietly in bed if your symptoms are severe. Change position slowly. There is usually one position that will feel best. This might be lying on one side or lying on your back with your head slightly raised on pillows.    Do not drive or work with dangerous machinery for one week after symptoms disappear. This is in case symptoms return suddenly.    Take medicine as prescribed to relieve your symptoms. Unless another medicine was prescribed for nausea, vomiting, and vertigo, you may use over-the-counter motion sickness medicine, such as meclizine or dimenhydrinate.  Follow-up care  Follow up with your healthcare provider, or as directed. Tell your provider about any ringing in  the ear or hearing loss.  If you had a CT or MRI scan, a specialist will review it. You will be told of any new findings that may affect your care.  When to seek medical advice  Call your healthcare provider right away if any of these occur:    Vertigo gets worse even after taking prescribed medicine    Repeated vomiting even after taking prescribed medicine    Increased weakness or fainting    Severe headache or unusual drowsiness or confusion    Weakness of an arm or leg or one side of the face    Difficulty walking    Difficulty with speech or vision    Seizure    Trouble hearing    Fever of 100.4 F (38 C) or higher, or as directed by your healthcare provider    Fast heart rate    Chest pain   Date Last Reviewed: 7/10/2015    1435-6825 [employer]. [address] [place] [address], [place] [address]. All rights reserved. This information is not intended as a substitute for professional medical care. Always follow your healthcare professional's instructions.

## 2017-10-24 NOTE — PROGRESS NOTES
SUBJECTIVE:   Mike Schafer is a 47 year old male who presents to clinic today for the following health issues:      Dizziness  Onset: since yesterday     Description:   Do you feel faint:  YES  Does it feel like the surroundings (bed, room) are moving: YES  Unsteady/off balance: YES  Have you passed out or fallen: no     Intensity: moderate    Progression of Symptoms:  worsening    Accompanying Signs & Symptoms:  Heart palpitations: no   Nausea, vomiting: no   Weakness in arms or legs: YES  Fatigue: no   Vision or speech changes: no   Ringing in ears (Tinnitus): YES- left ear : feels plugged   Hearing Loss: YES---left ear    History:   Head trauma/concussion hx: no   Previous similar symptoms: no   Recent bleeding history: no     Precipitating factors:   Worse with activity or head movement: YES- head movement  Any new medications (BP?): no   Alcohol/drug abuse/withdrawal: no     Alleviating factors:   Does staying in a fixed position give relief:  no     Therapies Tried and outcome:     Had a crown placed on left side on Friday and that is when he noticed left ear feeling plugged.         Problem list and histories reviewed & adjusted, as indicated.  Additional history: as documented    Patient Active Problem List   Diagnosis     Benign essential hypertension     bmi over 40     No past surgical history on file.    Social History   Substance Use Topics     Smoking status: Never Smoker     Smokeless tobacco: Never Used     Alcohol use 0.0 oz/week     0 Standard drinks or equivalent per week     Family History   Problem Relation Age of Onset     Breast Cancer Mother      DIABETES Father      Other Cancer Maternal Grandmother      Other Cancer Paternal Grandfather              Reviewed and updated as needed this visit by clinical staffTobacco       Reviewed and updated as needed this visit by Provider         ROS:  Constitutional, HEENT, cardiovascular, neuro  systems are negative, except as otherwise  "noted.      OBJECTIVE:   /82 (BP Location: Right arm, Patient Position: Chair, Cuff Size: Adult Large)  Pulse 69  Temp 98  F (36.7  C) (Oral)  Resp 20  Ht 5' 8\" (1.727 m)  Wt (!) 317 lb (143.8 kg)  SpO2 97%  BMI 48.2 kg/m2  Body mass index is 48.2 kg/(m^2).  GENERAL: healthy, alert and no distress  EYES: nystagmus B/L, dizziness with accomodation   HENT: normal cephalic/atraumatic, both ears: occluded with wax, nose and mouth without ulcers or lesions, oropharynx clear and oral mucous membranes moist  NECK: no adenopathy, no asymmetry, masses, or scars and thyroid normal to palpation  RESP: lungs clear to auscultation - no rales, rhonchi or wheezes  CV: regular rate and rhythm, normal S1 S2  ABDOMEN: soft, nontender, no hepatosplenomegaly, no masses and bowel sounds normal  MS: no gross musculoskeletal defects noted, no edema  NEURO: Normal strength and tone, sensory exam grossly normal, mentation intact and positive ashkan rueda pike maneuver   PSYCH: mentation appears normal, affect normal/bright    Diagnostic Test Results:  none     ASSESSMENT/PLAN:         1. Benign paroxysmal positional vertigo, unspecified laterality  - sxs and exam consistent with vertigo. Discussed epley maneuver.   - PHYSICAL THERAPY REFERRAL  - meclizine (ANTIVERT) 25 MG tablet; Take 1 tablet (25 mg) by mouth every 6 hours as needed for dizziness  Dispense: 30 tablet; Refill: 0    2. Essential hypertension, benign  - stable. Will continue with current regimen.   - metoprolol (TOPROL-XL) 50 MG 24 hr tablet; Take 1 tablet (50 mg) by mouth daily  Dispense: 90 tablet; Refill: 1    3. Bilateral impacted cerumen   -    Bilateral impacted cerumen.  Verbal consent obtained. Cerumen disimpacted using a combination of forced water irrigation, bionix lighted curette. Otoscopic Examination at conclusion of procedure confirms clean ear Canal and normal tympanic membrane.    See Patient Instructions    Jaylin Flores MD  [place] [facility]

## 2018-03-09 ENCOUNTER — OFFICE VISIT (OUTPATIENT)
Dept: FAMILY MEDICINE | Facility: CLINIC | Age: 48
End: 2018-03-09
Payer: COMMERCIAL

## 2018-03-09 VITALS
HEART RATE: 65 BPM | DIASTOLIC BLOOD PRESSURE: 85 MMHG | SYSTOLIC BLOOD PRESSURE: 143 MMHG | BODY MASS INDEX: 48.06 KG/M2 | RESPIRATION RATE: 14 BRPM | TEMPERATURE: 97.7 F | OXYGEN SATURATION: 95 % | WEIGHT: 315 LBS

## 2018-03-09 DIAGNOSIS — I10 BENIGN ESSENTIAL HYPERTENSION: Primary | ICD-10-CM

## 2018-03-09 DIAGNOSIS — E66.01 MORBID OBESITY (H): ICD-10-CM

## 2018-03-09 DIAGNOSIS — R10.11 ABDOMINAL PAIN, RIGHT UPPER QUADRANT: ICD-10-CM

## 2018-03-09 DIAGNOSIS — N52.1 ERECTILE DYSFUNCTION DUE TO DISEASES CLASSIFIED ELSEWHERE: ICD-10-CM

## 2018-03-09 LAB
ALBUMIN UR-MCNC: NEGATIVE MG/DL
APPEARANCE UR: CLEAR
BASOPHILS # BLD AUTO: 0.1 10E9/L (ref 0–0.2)
BASOPHILS NFR BLD AUTO: 0.6 %
BILIRUB UR QL STRIP: NEGATIVE
COLOR UR AUTO: YELLOW
DIFFERENTIAL METHOD BLD: NORMAL
EOSINOPHIL # BLD AUTO: 0.1 10E9/L (ref 0–0.7)
EOSINOPHIL NFR BLD AUTO: 1.7 %
ERYTHROCYTE [DISTWIDTH] IN BLOOD BY AUTOMATED COUNT: 13.4 % (ref 10–15)
GLUCOSE UR STRIP-MCNC: NEGATIVE MG/DL
HCT VFR BLD AUTO: 44.3 % (ref 40–53)
HGB BLD-MCNC: 15.1 G/DL (ref 13.3–17.7)
HGB UR QL STRIP: NEGATIVE
KETONES UR STRIP-MCNC: NEGATIVE MG/DL
LEUKOCYTE ESTERASE UR QL STRIP: NEGATIVE
LYMPHOCYTES # BLD AUTO: 2.2 10E9/L (ref 0.8–5.3)
LYMPHOCYTES NFR BLD AUTO: 26.2 %
MCH RBC QN AUTO: 30.5 PG (ref 26.5–33)
MCHC RBC AUTO-ENTMCNC: 34.1 G/DL (ref 31.5–36.5)
MCV RBC AUTO: 90 FL (ref 78–100)
MONOCYTES # BLD AUTO: 0.7 10E9/L (ref 0–1.3)
MONOCYTES NFR BLD AUTO: 8.2 %
NEUTROPHILS # BLD AUTO: 5.2 10E9/L (ref 1.6–8.3)
NEUTROPHILS NFR BLD AUTO: 63.3 %
NITRATE UR QL: NEGATIVE
PH UR STRIP: 5 PH (ref 5–7)
PLATELET # BLD AUTO: 275 10E9/L (ref 150–450)
RBC # BLD AUTO: 4.95 10E12/L (ref 4.4–5.9)
SOURCE: NORMAL
SP GR UR STRIP: >1.03 (ref 1–1.03)
UROBILINOGEN UR STRIP-ACNC: 0.2 EU/DL (ref 0.2–1)
WBC # BLD AUTO: 8.3 10E9/L (ref 4–11)

## 2018-03-09 PROCEDURE — 99214 OFFICE O/P EST MOD 30 MIN: CPT | Performed by: FAMILY MEDICINE

## 2018-03-09 PROCEDURE — 85025 COMPLETE CBC W/AUTO DIFF WBC: CPT | Performed by: FAMILY MEDICINE

## 2018-03-09 PROCEDURE — 36415 COLL VENOUS BLD VENIPUNCTURE: CPT | Performed by: FAMILY MEDICINE

## 2018-03-09 PROCEDURE — 81003 URINALYSIS AUTO W/O SCOPE: CPT | Performed by: FAMILY MEDICINE

## 2018-03-09 RX ORDER — VARDENAFIL HYDROCHLORIDE 10 MG/1
10 TABLET ORAL DAILY PRN
Qty: 6 TABLET | Refills: 1 | Status: SHIPPED | OUTPATIENT
Start: 2018-03-09 | End: 2020-06-30

## 2018-03-09 NOTE — PROGRESS NOTES
SUBJECTIVE:   Mike Schafer is a 47 year old male who presents to clinic today for the following health issues:      Abdominal Pain  /85 (BP Location: Left arm, Patient Position: Chair, Cuff Size: Adult Large)  Pulse 65  Temp 97.7  F (36.5  C) (Oral)  Resp 14  Wt (!) 316 lb 1.6 oz (143.4 kg)  SpO2 95%  BMI 48.06 kg/m2      Duration: 5 days    Description (location/character/radiation): right upper abdominal/chest area which radiates to the right shoulder area       Associated flank pain: on right side    Intensity:  7/10    Accompanying signs and symptoms:        Fever/Chills: no        Gas/Bloating: no        Nausea/vomitting: YES- nausea today       Diarrhea: no        Dysuria or Hematuria: no     History (previous similar pain/trauma/previous testing): yes, was seen by Dr. Corbett last year for same thing    Precipitating or alleviating factors:       Pain worse with eating/BM/urination: eating       Pain relieved by BM: no     Therapies tried and outcome: antacids     LMP:  not applicable      Past Medical History:   Diagnosis Date     Hypertension        History reviewed. No pertinent surgical history.    Family History   Problem Relation Age of Onset     Breast Cancer Mother      DIABETES Father      Other Cancer Maternal Grandmother      Other Cancer Paternal Grandfather        Social History   Substance Use Topics     Smoking status: Never Smoker     Smokeless tobacco: Never Used     Alcohol use 0.0 oz/week     0 Standard drinks or equivalent per week     His prior gallbladder study did not show stones, although he looks like a logical candidate  On exam the vital signs are stable  Weight is Body mass index is 48.06 kg/(m^2).   Eyes show jessie   No neck masses or thyromegaly.Ear nose and throat shows normal   No bruits, murmers, rubs or extrasounds. No cardiomegaly or chest wall tenderness. Lungs clear, no abdominal masses or organomegaly. No CVA tenderness.  Skin eval no rash, tender  RUQ  No hernias, good range of motion neck, back and extremities. No abnormal skin lesions. Normal genitalia. Good peripheral pulses. No adenopathy.  Normal gait and stance. Neck is supple.  Back exam shows no cva tenderness    (I10) Benign essential hypertension  (primary encounter diagnosis)  Comment:   Plan: continue med    (R10.11) Abdominal pain, right upper quadrant  Comment:   Plan: CBC with platelets and differential, UA reflex         to Microscopic, omeprazole (PRILOSEC) 20 MG CR         capsule            (E66.01) Morbid obesity (H)  Comment:   Plan:     (N52.1) Erectile dysfunction due to diseases classified elsewhere  Comment:   Plan: vardenafil (LEVITRA) 10 MG tablet        Recurrent issues, obesity and hbp

## 2018-03-09 NOTE — MR AVS SNAPSHOT
"              After Visit Summary   3/9/2018    Mkie Schafer    MRN: [id number]           Patient Information     Date Of Birth          1970        Visit Information        Provider Department      3/9/2018 2:00 PM Keron Corbett MD [facility]        Today's Diagnoses     Benign essential hypertension    -  1    Abdominal pain, right upper quadrant        Morbid obesity (H)        Erectile dysfunction due to diseases classified elsewhere           Follow-ups after your visit        Who to contact     If you have questions or need follow up information about today's clinic visit or your schedule please contact [facility] directly at [phone].  Normal or non-critical lab and imaging results will be communicated to you by MyChart, letter or phone within 4 business days after the clinic has received the results. If you do not hear from us within 7 days, please contact the clinic through [employer] or phone. If you have a critical or abnormal lab result, we will notify you by phone as soon as possible.  Submit refill requests through [employer] or call your pharmacy and they will forward the refill request to us. Please allow 3 business days for your refill to be completed.          Additional Information About Your Visit        MyChart Information     [employer] lets you send messages to your doctor, view your test results, renew your prescriptions, schedule appointments and more. To sign up, go to [email or web address] . Click on \"Log in\" on the left side of the screen, which will take you to the Welcome page. Then click on \"Sign up Now\" on the right side of the page.     You will be asked to enter the access code listed below, as well as some personal information. Please follow the directions to create your username and password.     Your access code is: [email or web address]  Expires: 2018  2:57 PM     Your access code will  in 90 days. If you need help or a new " code, please call your [place] clinic or [phone].        Care EveryWhere ID     This is your Care EveryWhere ID. This could be used by other organizations to access your [place] medical records  [id number]        Your Vitals Were     Pulse Temperature Respirations Pulse Oximetry BMI (Body Mass Index)       65 97.7  F (36.5  C) (Oral) 14 95% 48.06 kg/m2        Blood Pressure from Last 3 Encounters:   03/09/18 143/85   10/24/17 133/82   04/28/17 138/84    Weight from Last 3 Encounters:   03/09/18 (!) 316 lb 1.6 oz (143.4 kg)   10/24/17 (!) 317 lb (143.8 kg)   04/28/17 (!) 313 lb 6.4 oz (142.2 kg)              We Performed the Following     CBC with platelets and differential     UA reflex to Microscopic          Today's Medication Changes          These changes are accurate as of 3/9/18  3:00 PM.  If you have any questions, ask your nurse or doctor.               Start taking these medicines.        Dose/Directions    omeprazole 20 MG CR capsule   Commonly known as:  priLOSEC   Used for:  Abdominal pain, right upper quadrant   Started by:  Keron Corbett MD        Dose:  20 mg   Take 1 capsule (20 mg) by mouth daily   Quantity:  30 capsule   Refills:  1       vardenafil 10 MG tablet   Commonly known as:  LEVITRA   Used for:  Erectile dysfunction due to diseases classified elsewhere   Started by:  Keron Corbett MD        Dose:  10 mg   Take 1 tablet (10 mg) by mouth daily as needed 60 min prior to sex. Do not use with nitroglycerin, terazosin or doxazosin.   Quantity:  6 tablet   Refills:  1            Where to get your medicines      These medications were sent to [id number] - [place], [place] - [address]  [address] [address]     Phone:  [phone]     omeprazole 20 MG CR capsule    vardenafil 10 MG tablet                Primary Care Provider Office Phone # Fax #    Keron Corbett [phone] [address]  [address]        Equal Access to Services     [facility] : Hadii camila field natanael Saucedo, watanda luqdc, qaybta anselmobennyjaskaran callahan, isidra mcgraw. So [facility] [phone].    ATENCIÓN: Si habla español, tiene a orantes disposición servicios gratuitos de asistencia lingüística. Sadafame al [phone].    We comply with applicable federal civil rights laws and [place] laws. We do not discriminate on the basis of race, color, national origin, age, disability, sex, sexual orientation, or gender identity.            Thank you!     Thank you for choosing [facility]  for your care. Our goal is always to provide you with excellent care. Hearing back from our patients is one way we can continue to improve our services. Please take a few minutes to complete the written survey that you may receive in the mail after your visit with us. Thank you!             Your Updated Medication List - Protect others around you: Learn how to safely use, store and throw away your medicines at www.disposemymeds.org.          This list is accurate as of 3/9/18  3:00 PM.  Always use your most recent med list.                   Brand Name Dispense Instructions for use Diagnosis    meclizine 25 MG tablet    ANTIVERT    30 tablet    Take 1 tablet (25 mg) by mouth every 6 hours as needed for dizziness    Benign paroxysmal positional vertigo, unspecified laterality       metoprolol succinate 50 MG 24 hr tablet    TOPROL-XL    90 tablet    Take 1 tablet (50 mg) by mouth daily    Essential hypertension, benign       omeprazole 20 MG CR capsule    priLOSEC    30 capsule    Take 1 capsule (20 mg) by mouth daily    Abdominal pain, right upper quadrant       vardenafil 10 MG tablet    LEVITRA    6 tablet    Take 1 tablet (10 mg) by mouth daily as needed 60 min prior to sex. Do not use with nitroglycerin, terazosin or doxazosin.    Erectile dysfunction due to diseases classified elsewhere

## 2018-05-23 DIAGNOSIS — I10 ESSENTIAL HYPERTENSION, BENIGN: ICD-10-CM

## 2018-05-24 ENCOUNTER — ALLIED HEALTH/NURSE VISIT (OUTPATIENT)
Dept: NURSING | Facility: CLINIC | Age: 48
End: 2018-05-24
Payer: COMMERCIAL

## 2018-05-24 VITALS — DIASTOLIC BLOOD PRESSURE: 80 MMHG | SYSTOLIC BLOOD PRESSURE: 130 MMHG

## 2018-05-24 DIAGNOSIS — Z01.30 BP CHECK: Primary | ICD-10-CM

## 2018-05-24 PROCEDURE — 99207 ZZC NO CHARGE NURSE ONLY: CPT

## 2018-05-24 RX ORDER — METOPROLOL SUCCINATE 50 MG/1
TABLET, EXTENDED RELEASE ORAL
Qty: 90 TABLET | Refills: 1 | Status: SHIPPED | OUTPATIENT
Start: 2018-05-24 | End: 2018-12-13

## 2018-05-24 NOTE — TELEPHONE ENCOUNTER
"Requested Prescriptions   Pending Prescriptions Disp Refills     metoprolol succinate (TOPROL-XL) 50 MG 24 hr tablet [Pharmacy Med Name: Metoprolol Succinate ER Oral Tablet Extended Release 24 Hour 50 MG] 90 tablet 0    Last Written Prescription Date:  10/24/2017  Last Fill Quantity: 90 tablet,  # refills: 1   Last office visit: 3/9/2018 with prescribing provider:  03/09/2018   Future Office Visit:     Sig: Take 1 tablet (50 mg) by mouth daily.    Beta-Blockers Protocol Failed    5/23/2018 12:22 AM       Failed - Blood pressure under 140/90 in past 12 months    BP Readings from Last 3 Encounters:   03/09/18 143/85   10/24/17 133/82   04/28/17 138/84                Passed - Patient is age 6 or older       Passed - Recent (12 mo) or future (30 days) visit within the authorizing provider's specialty    Patient had office visit in the last 12 months or has a visit in the next 30 days with authorizing provider or within the authorizing provider's specialty.  See \"Patient Info\" tab in inbasket, or \"Choose Columns\" in Meds & Orders section of the refill encounter.              Miquel Huerta XRT  "

## 2018-05-24 NOTE — TELEPHONE ENCOUNTER
Pt walks in, wants refill, recommend MA BP check while here since did not pass last BP  Prescription approved per [facility] Refill Protocol.  Esther Stoner, RN, BSN

## 2018-08-10 ENCOUNTER — OFFICE VISIT (OUTPATIENT)
Dept: URGENT CARE | Facility: URGENT CARE | Age: 48
End: 2018-08-10
Payer: COMMERCIAL

## 2018-08-10 VITALS
HEART RATE: 70 BPM | SYSTOLIC BLOOD PRESSURE: 124 MMHG | OXYGEN SATURATION: 97 % | DIASTOLIC BLOOD PRESSURE: 80 MMHG | WEIGHT: 315 LBS | BODY MASS INDEX: 48.05 KG/M2 | TEMPERATURE: 97.7 F

## 2018-08-10 DIAGNOSIS — H10.32 ACUTE BACTERIAL CONJUNCTIVITIS OF LEFT EYE: Primary | ICD-10-CM

## 2018-08-10 PROCEDURE — 99213 OFFICE O/P EST LOW 20 MIN: CPT | Performed by: FAMILY MEDICINE

## 2018-08-10 RX ORDER — POLYMYXIN B SULFATE AND TRIMETHOPRIM 1; 10000 MG/ML; [USP'U]/ML
1 SOLUTION OPHTHALMIC EVERY 6 HOURS
Qty: 1 ML | Refills: 0 | Status: SHIPPED | OUTPATIENT
Start: 2018-08-10 | End: 2018-08-15

## 2018-08-10 NOTE — MR AVS SNAPSHOT
After Visit Summary   8/10/2018    Mike Schafer    MRN: [id number]           Patient Information     Date Of Birth          1970        Visit Information        Provider Department      8/10/2018 12:00 PM Shiv Louis MD [facility]        Today's Diagnoses     Acute bacterial conjunctivitis of left eye    -  1      Care Instructions    follow up with your primary care provider if not better in 5-7 days.               Follow-ups after your visit        Who to contact     If you have questions or need follow up information about today's clinic visit or your schedule please contact [facility] directly at [phone].  Normal or non-critical lab and imaging results will be communicated to you by MyChart, letter or phone within 4 business days after the clinic has received the results. If you do not hear from us within 7 days, please contact the clinic through MyChart or phone. If you have a critical or abnormal lab result, we will notify you by phone as soon as possible.  Submit refill requests through [employer] or call your pharmacy and they will forward the refill request to us. Please allow 3 business days for your refill to be completed.          Additional Information About Your Visit        Care EveryWhere ID     This is your Care EveryWhere ID. This could be used by other organizations to access your [place] medical records  [id number]        Your Vitals Were     Pulse Temperature Pulse Oximetry BMI (Body Mass Index)          70 97.7  F (36.5  C) (Tympanic) 97% 48.05 kg/m2         Blood Pressure from Last 3 Encounters:   08/10/18 124/80   05/24/18 130/80   03/09/18 143/85    Weight from Last 3 Encounters:   08/10/18 316 lb (143.3 kg)   03/09/18 (!) 316 lb 1.6 oz (143.4 kg)   10/24/17 (!) 317 lb (143.8 kg)              Today, you had the following     No orders found for display         Today's Medication Changes          These changes are accurate as of  8/10/18 12:22 PM.  If you have any questions, ask your nurse or doctor.               Start taking these medicines.        Dose/Directions    trimethoprim-polymyxin b ophthalmic solution   Commonly known as:  POLYTRIM   Used for:  Acute bacterial conjunctivitis of left eye   Started by:  Shiv Louis MD        Dose:  1 drop   Apply 1 drop to eye every 6 hours for 5 days   Quantity:  1 mL   Refills:  0            Where to get your medicines      These medications were sent to [id number] - [place], [place] - [address]  [address] [address]     Phone:  [phone]     trimethoprim-polymyxin b ophthalmic solution                Primary Care Provider Office Phone # Fax #    Keron Leonidas Corbett [phone] [address] [address] [address]        Equal Access to Services     [facility]: Hadii camila field hadasho Soomaali, waaxda luqadaha, qaybta kaalmada adeegyada, isidra sarabia . So [facility] [phone].    ATENCIÓN: Si habla español, tiene a orantes disposición servicios gratuitos de asistencia lingüística. [facility] [phone].    We comply with applicable federal civil rights laws and [place] laws. We do not discriminate on the basis of race, color, national origin, age, disability, sex, sexual orientation, or gender identity.            Thank you!     Thank you for choosing [facility]  for your care. Our goal is always to provide you with excellent care. Hearing back from our patients is one way we can continue to improve our services. Please take a few minutes to complete the written survey that you may receive in the mail after your visit with us. Thank you!             Your Updated Medication List - Protect others around you: Learn how to safely use, store and throw away your medicines at www.disposemymeds.org.          This list is accurate as of 8/10/18 12:22 PM.  Always use your most recent med list.                    Brand Name Dispense Instructions for use Diagnosis    meclizine 25 MG tablet    ANTIVERT    30 tablet    Take 1 tablet (25 mg) by mouth every 6 hours as needed for dizziness    Benign paroxysmal positional vertigo, unspecified laterality       metoprolol succinate 50 MG 24 hr tablet    TOPROL-XL    90 tablet    Take 1 tablet (50 mg) by mouth daily.    Essential hypertension, benign       omeprazole 20 MG CR capsule    priLOSEC    30 capsule    Take 1 capsule (20 mg) by mouth daily    Abdominal pain, right upper quadrant       trimethoprim-polymyxin b ophthalmic solution    POLYTRIM    1 mL    Apply 1 drop to eye every 6 hours for 5 days    Acute bacterial conjunctivitis of left eye       vardenafil 10 MG tablet    LEVITRA    6 tablet    Take 1 tablet (10 mg) by mouth daily as needed 60 min prior to sex. Do not use with nitroglycerin, terazosin or doxazosin.    Erectile dysfunction due to diseases classified elsewhere

## 2018-08-10 NOTE — PROGRESS NOTES
SUBJECTIVE:  Chief Complaint:   Chief Complaint   Patient presents with     Urgent Care     Eye Problem     PT states having redness, swelling, and drainage in left eye. PT does not wear contacts or eyeglasses.      History of Present Illness:  Mike Schafer is a 47 year old male who presents complaining of left eye pain (mild irritation), redness, blurry vision, swelling (lower and upper eyelids of the left eye), yellowish thick drainage over the past few days. Eyelids were stuck shut this morning at the left eye.  . The symptoms have worsened recently. .    Associated Signs and Symptoms: as listed above.   Treatment measures tried include: none.   Contact wearer : No    Past Medical History:   Diagnosis Date     Hypertension      Current Outpatient Prescriptions   Medication Sig Dispense Refill     metoprolol succinate (TOPROL-XL) 50 MG 24 hr tablet Take 1 tablet (50 mg) by mouth daily. 90 tablet 1     omeprazole (PRILOSEC) 20 MG CR capsule Take 1 capsule (20 mg) by mouth daily 30 capsule 1     meclizine (ANTIVERT) 25 MG tablet Take 1 tablet (25 mg) by mouth every 6 hours as needed for dizziness 30 tablet 0     vardenafil (LEVITRA) 10 MG tablet Take 1 tablet (10 mg) by mouth daily as needed 60 min prior to sex. Do not use with nitroglycerin, terazosin or doxazosin. 6 tablet 1        ROS:  Review of systems negative except as stated above.    OBJECTIVE:  /80 (BP Location: Right arm, Patient Position: Chair, Cuff Size: Adult Large)  Pulse 70  Temp 97.7  F (36.5  C) (Tympanic)  Wt 316 lb (143.3 kg)  SpO2 97%  BMI 48.05 kg/m2  General: no acute distress  Eye exam: left eye abnormal findings: conjunctivitis with erythema.  No photophobia. pupils equally round and reactive to light.  extraocular movements intact.     ASSESSMENT:  Acute Bacterial Conjunctivitis of the left eye    PLAN:  Rx:  Polytrim Ophthalmic Solution  follow up with the primary care provider if not better in 5-7 days.     Shiv Louis  MD

## 2018-08-14 ENCOUNTER — OFFICE VISIT (OUTPATIENT)
Dept: URGENT CARE | Facility: URGENT CARE | Age: 48
End: 2018-08-14
Payer: COMMERCIAL

## 2018-08-14 VITALS
SYSTOLIC BLOOD PRESSURE: 118 MMHG | TEMPERATURE: 97.3 F | OXYGEN SATURATION: 98 % | HEART RATE: 72 BPM | DIASTOLIC BLOOD PRESSURE: 80 MMHG
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
## 2018-08-14 DIAGNOSIS — L03.116 CELLULITIS OF LEFT LOWER EXTREMITY: Primary | ICD-10-CM

## 2018-08-14 PROCEDURE — 99214 OFFICE O/P EST MOD 30 MIN: CPT | Performed by: FAMILY MEDICINE

## 2018-08-14 RX ORDER — SULFAMETHOXAZOLE/TRIMETHOPRIM 800-160 MG
1 TABLET ORAL 2 TIMES DAILY
Qty: 14 TABLET | Refills: 0 | Status: SHIPPED | OUTPATIENT
Start: 2018-08-14 | End: 2019-04-12

## 2018-08-14 NOTE — PROGRESS NOTES
Subjective:   Mike Schafer is a 47 year old male who presents for   Chief Complaint   Patient presents with     Urgent Care     Derm Problem     left foot bug bites, possibly infected x 1 week     Denies hx of skin infections  Has a wound on the inside of left ankle initially from itching from bug bites and he had started scratching the area. The wound got larger 3 days ago when a musical amp fell on it. It started draining 2 days ago. Denies fevers. Able to walk but causes discomfort . Patient reports he has been screened multiple times and does not have a diagnosis of DM2.     Itching has improved over last couple of days  But still has some red spots that are small and minimally itching.  Has not tried any cremes.     PMHX/PSHX/MEDS/ALLERGIES/SHX/FHX reviewed in Epic.    Patient Active Problem List    Diagnosis Date Noted     Benign essential hypertension 07/12/2016     Priority: Medium     bmi over 40 07/12/2016     Priority: Medium       Current Outpatient Prescriptions   Medication     meclizine (ANTIVERT) 25 MG tablet     metoprolol succinate (TOPROL-XL) 50 MG 24 hr tablet     omeprazole (PRILOSEC) 20 MG CR capsule     sulfamethoxazole-trimethoprim (BACTRIM DS/SEPTRA DS) 800-160 MG per tablet     trimethoprim-polymyxin b (POLYTRIM) ophthalmic solution     vardenafil (LEVITRA) 10 MG tablet     No current facility-administered medications for this visit.      ROS:  As above per HPI    Objective:   /80 (BP Location: Right arm, Patient Position: Chair, Cuff Size: Adult Large)  Pulse 72  Temp 97.3  F (36.3  C) (Tympanic)  SpO2 98%, There is no height or weight on file to calculate BMI.  Gen:  NAD, well-nourished, sitting in chair comfortably, very pleasant  HEENT: EOMI, PERRL sclera anicteric, Head normocephalic, ; nares patent; oropharynx pink and moist  Neck: trachea midline  CV:  HDS, 1+ L dorsal pedis pulse  Pulm:  No  increased work of breathing   ABD: soft, non-distended  Extrem: trace edema of  lower extremities, medial side of left ankle is a superficial wound about a quarter in size with minor drainage. There is slight discomfort with palpation around the wound but no impressive redness or erythema of skin.   Skin: scattered small red lesions that are small in size of lower extremity skin  Psych: Euthymic, linear thoughts, normal rate of speech          Assessment & Plan:   Mike Schafer, 47 year old male who presents with:    Cellulitis of left lower extremity (left medial ankle)  Will treat for skin infection at this time ith the drainage coming from the wound. Wound was cleansed and then dressed with a nonadherent bandage. Bactrim twice daily for a week. Follow-up as needed.   - sulfamethoxazole-trimethoprim (BACTRIM DS/SEPTRA DS) 800-160 MG per tablet  Dispense: 14 tablet; Refill: 0    Bug bites: recommended topical 1% hydrocortisone to help with itching    Matias Mota MD    [facility]    Options for treatment and/or follow-up care were reviewed with the patient. Mike Schafer and/or legal guardian was engaged and actively involved in the decision making process. Patient/guardian verbalized understanding of the options discussed and was satisfied with the final plan.

## 2018-08-14 NOTE — PATIENT INSTRUCTIONS
Take antibiotic (Bactrim) twice a day for a week    Keep wound covered and change dressing once a day    If you are having fevers , increasing redness, increasing pain return to be seen immediately      For bug bites use topical 1% hydrocortisone cream apply three times a day as needed

## 2018-08-14 NOTE — MR AVS SNAPSHOT
After Visit Summary   8/14/2018    Mike Schafer    MRN: [id number]           Patient Information     Date Of Birth          1970        Visit Information        Provider Department      8/14/2018 1:50 PM Matias Mota MD [facility]        Today's Diagnoses     Cellulitis of left lower extremity    -  1      Care Instructions    Take antibiotic (Bactrim) twice a day for a week    Keep wound covered and change dressing once a day    If you are having fevers , increasing redness, increasing pain return to be seen immediately      For bug bites use topical 1% hydrocortisone cream apply three times a day as needed          Follow-ups after your visit        Who to contact     If you have questions or need follow up information about today's clinic visit or your schedule please contact [facility] directly at [phone].  Normal or non-critical lab and imaging results will be communicated to you by MyChart, letter or phone within 4 business days after the clinic has received the results. If you do not hear from us within 7 days, please contact the clinic through MyChart or phone. If you have a critical or abnormal lab result, we will notify you by phone as soon as possible.  Submit refill requests through [employer] or call your pharmacy and they will forward the refill request to us. Please allow 3 business days for your refill to be completed.          Additional Information About Your Visit        Care EveryWhere ID     This is your Care EveryWhere ID. This could be used by other organizations to access your [facility] medical records  [id number]        Your Vitals Were     Pulse Temperature Pulse Oximetry             72 97.3  F (36.3  C) (Tympanic) 98%          Blood Pressure from Last 3 Encounters:   08/14/18 118/80   08/10/18 124/80   05/24/18 130/80    Weight from Last 3 Encounters:   08/10/18 316 lb (143.3 kg)   03/09/18 (!) 316 lb 1.6 oz (143.4 kg)    10/24/17 (!) 317 lb (143.8 kg)              Today, you had the following     No orders found for display         Today's Medication Changes          These changes are accurate as of 8/14/18  2:42 PM.  If you have any questions, ask your nurse or doctor.               Start taking these medicines.        Dose/Directions    sulfamethoxazole-trimethoprim 800-160 MG per tablet   Commonly known as:  BACTRIM DS/SEPTRA DS   Used for:  Cellulitis of left lower extremity        Dose:  1 tablet   Take 1 tablet by mouth 2 times daily   Quantity:  14 tablet   Refills:  0            Where to get your medicines      These medications were sent to [id number] - [place], [place] - [address]  [address] [address]     Phone:  [phone]     sulfamethoxazole-trimethoprim 800-160 MG per tablet                Primary Care Provider Office Phone # Fax #    Keron Corbett [phone]        Equal Access to Services     [facility]: Hadii aad ku hadasho Soomaali, waaxda luqadaha, qaybta kaalmada adeegyada, waxay idiin hayrobertn zelalem sarabia . So [facility] [phone].    ATENCIÓN: Si habla español, tiene a orantes disposición servicios gratuitos de asistencia lingüística. Llame al [phone].    We comply with applicable federal civil rights laws and [place] laws. We do not discriminate on the basis of race, color, national origin, age, disability, sex, sexual orientation, or gender identity.            Thank you!     Thank you for choosing [facility]  for your care. Our goal is always to provide you with excellent care. Hearing back from our patients is one way we can continue to improve our services. Please take a few minutes to complete the written survey that you may receive in the mail after your visit with us. Thank you!             Your Updated Medication List - Protect others around you: Learn how to safely use,  store and throw away your medicines at www.disposemymeds.org.          This list is accurate as of 8/14/18  2:42 PM.  Always use your most recent med list.                   Brand Name Dispense Instructions for use Diagnosis    meclizine 25 MG tablet    ANTIVERT    30 tablet    Take 1 tablet (25 mg) by mouth every 6 hours as needed for dizziness    Benign paroxysmal positional vertigo, unspecified laterality       metoprolol succinate 50 MG 24 hr tablet    TOPROL-XL    90 tablet    Take 1 tablet (50 mg) by mouth daily.    Essential hypertension, benign       omeprazole 20 MG CR capsule    priLOSEC    30 capsule    Take 1 capsule (20 mg) by mouth daily    Abdominal pain, right upper quadrant       sulfamethoxazole-trimethoprim 800-160 MG per tablet    BACTRIM DS/SEPTRA DS    14 tablet    Take 1 tablet by mouth 2 times daily    Cellulitis of left lower extremity       trimethoprim-polymyxin b ophthalmic solution    POLYTRIM    1 mL    Apply 1 drop to eye every 6 hours for 5 days    Acute bacterial conjunctivitis of left eye       vardenafil 10 MG tablet    LEVITRA    6 tablet    Take 1 tablet (10 mg) by mouth daily as needed 60 min prior to sex. Do not use with nitroglycerin, terazosin or doxazosin.    Erectile dysfunction due to diseases classified elsewhere

## 2018-12-12 DIAGNOSIS — I10 ESSENTIAL HYPERTENSION, BENIGN: ICD-10-CM

## 2018-12-12 NOTE — TELEPHONE ENCOUNTER
"Requested Prescriptions   Pending Prescriptions Disp Refills     metoprolol succinate ER (TOPROL-XL) 50 MG 24 hr tablet    Last Written Prescription Date:  5/24/18  Last Fill Quantity: 90 Tablets,  # refills: 1   Last office visit: 3/9/2018 with prescribing provider:  Chelle   Future Office Visit:     90 tablet 1    Beta-Blockers Protocol Passed - 12/12/2018  3:58 PM       Passed - Blood pressure under 140/90 in past 12 months    BP Readings from Last 3 Encounters:   08/14/18 118/80   08/10/18 124/80   05/24/18 130/80                Passed - Patient is age 6 or older       Passed - Recent (12 mo) or future (30 days) visit within the authorizing provider's specialty    Patient had office visit in the last 12 months or has a visit in the next 30 days with authorizing provider or within the authorizing provider's specialty.  See \"Patient Info\" tab in inbasket, or \"Choose Columns\" in Meds & Orders section of the refill encounter.              "

## 2018-12-13 RX ORDER — METOPROLOL SUCCINATE 50 MG/1
TABLET, EXTENDED RELEASE ORAL
Qty: 90 TABLET | Refills: 0 | Status: SHIPPED | OUTPATIENT
Start: 2018-12-13 | End: 2019-03-12

## 2018-12-14 DIAGNOSIS — I10 ESSENTIAL HYPERTENSION, BENIGN: ICD-10-CM

## 2018-12-17 RX ORDER — METOPROLOL SUCCINATE 50 MG/1
TABLET, EXTENDED RELEASE ORAL
Qty: 90 TABLET | Refills: 0 | OUTPATIENT
Start: 2018-12-17

## 2018-12-17 NOTE — TELEPHONE ENCOUNTER
"Requested Prescriptions   Pending Prescriptions Disp Refills     metoprolol succinate ER (TOPROL-XL) 50 MG 24 hr tablet [Pharmacy Med Name: Metoprolol Succinate ER Oral Tablet Extended Release 24 Hour 50 MG]  Last Written Prescription Date:  12/13/2018  Last Fill Quantity: 90 tablet,  # refills: 0   Last office visit: 3/9/2018 with prescribing provider:  Chelle      90 tablet 0     Sig: TAKE ONE TABLET BY MOUTH ONE TIME DAILY    Beta-Blockers Protocol Passed - 12/14/2018  7:02 AM       Passed - Blood pressure under 140/90 in past 12 months    BP Readings from Last 3 Encounters:   08/14/18 118/80   08/10/18 124/80   05/24/18 130/80                Passed - Patient is age 6 or older       Passed - Recent (12 mo) or future (30 days) visit within the authorizing provider's specialty    Patient had office visit in the last 12 months or has a visit in the next 30 days with authorizing provider or within the authorizing provider's specialty.  See \"Patient Info\" tab in inbasket, or \"Choose Columns\" in Meds & Orders section of the refill encounter.                "

## 2019-03-12 DIAGNOSIS — I10 ESSENTIAL HYPERTENSION, BENIGN: ICD-10-CM

## 2019-03-12 NOTE — LETTER
March 15, 2019      Mike Schafer  [address]  [facility] [place] [address]        Dear Mike,     We recently received a call from your pharmacy requesting a refill of your medication (BP med).    A review of your chart indicates that an appointment is required.  Please contact our office at [phone] to schedule your ANNUAL doctor's appointment.    We have authorized one refill of your medication to allow time for you to schedule your appointment.    Taking care of your health is important to us and ongoing visits with your provider are vital to your care.  We look forward to seeing you in the near future.          Sincerely,        Keron Corbett MD/Esther LYNCH

## 2019-03-12 NOTE — TELEPHONE ENCOUNTER
"Requested Prescriptions   Pending Prescriptions Disp Refills     metoprolol succinate ER (TOPROL-XL) 50 MG 24 hr tablet [Pharmacy Med Name: METOPROLOL SUCC ER 50 MG TAB]  Last Written Prescription Date:  12/13/2018  Last Fill Quantity: 90 tablet,  # refills: 0   Last office visit: 3/9/2018 with prescribing provider:  Chelle   Future Office Visit:     90 tablet 0     Sig: TAKE 1 TABLET BY MOUTH EVERY DAY    Beta-Blockers Protocol Failed - 3/12/2019  4:09 PM       Failed - Recent (12 mo) or future (30 days) visit within the authorizing provider's specialty    Patient had office visit in the last 12 months or has a visit in the next 30 days with authorizing provider or within the authorizing provider's specialty.  See \"Patient Info\" tab in inbasket, or \"Choose Columns\" in Meds & Orders section of the refill encounter.             Passed - Blood pressure under 140/90 in past 12 months    BP Readings from Last 3 Encounters:   08/14/18 118/80   08/10/18 124/80   05/24/18 130/80                Passed - Patient is age 6 or older       Passed - Medication is active on med list          "

## 2019-03-15 RX ORDER — METOPROLOL SUCCINATE 50 MG/1
TABLET, EXTENDED RELEASE ORAL
Qty: 30 TABLET | Refills: 0 | Status: SHIPPED | OUTPATIENT
Start: 2019-03-15 | End: 2019-04-06

## 2019-03-15 NOTE — TELEPHONE ENCOUNTER
Visit due: yes  Quantity sent: one month  Patient informed: letter sent  Pharmacy informed: yes  Prescription approved per [facility] Refill Protocol.  Esther Stoner RN, BSN

## 2019-04-06 DIAGNOSIS — I10 ESSENTIAL HYPERTENSION, BENIGN: ICD-10-CM

## 2019-04-06 NOTE — TELEPHONE ENCOUNTER
"Requested Prescriptions   Pending Prescriptions Disp Refills     metoprolol succinate ER (TOPROL-XL) 50 MG 24 hr tablet [Pharmacy Med Name: METOPROLOL SUCC ER 50 MG TAB]  Last Written Prescription Date:  03/15/2019  Last Fill Quantity: 30,  # refills: 0   Last office visit: 3/9/2018 with prescribing provider:  03/09/2018   Future Office Visit:     30 tablet 0     Sig: TAKE 1 TABLET BY MOUTH EVERY DAY    Beta-Blockers Protocol Failed - 4/6/2019  7:36 AM       Failed - Recent (12 mo) or future (30 days) visit within the authorizing provider's specialty    Patient had office visit in the last 12 months or has a visit in the next 30 days with authorizing provider or within the authorizing provider's specialty.  See \"Patient Info\" tab in inbasket, or \"Choose Columns\" in Meds & Orders section of the refill encounter.             Passed - Blood pressure under 140/90 in past 12 months    BP Readings from Last 3 Encounters:   08/14/18 118/80   08/10/18 124/80   05/24/18 130/80                Passed - Patient is age 6 or older       Passed - Medication is active on med list          "

## 2019-04-08 RX ORDER — METOPROLOL SUCCINATE 50 MG/1
50 TABLET, EXTENDED RELEASE ORAL DAILY
Qty: 7 TABLET | Refills: 0 | Status: SHIPPED | OUTPATIENT
Start: 2019-04-08 | End: 2019-04-12

## 2019-04-08 NOTE — TELEPHONE ENCOUNTER
Letter sent last month to make appt  LM on personal cell that he must make appt    Will approve 7 day fill only    Rosalind Keene RN, BS  Clinical Nurse Triage.

## 2019-04-08 NOTE — TELEPHONE ENCOUNTER
Pt returned call-informed of message-pt scheduled on 04/12/19 w/Dr Corbett.    Maren Crenshaw/KAYLIN

## 2019-04-10 DIAGNOSIS — I10 ESSENTIAL HYPERTENSION, BENIGN: ICD-10-CM

## 2019-04-10 RX ORDER — METOPROLOL SUCCINATE 50 MG/1
TABLET, EXTENDED RELEASE ORAL
Qty: 30 TABLET | Refills: 0 | OUTPATIENT
Start: 2019-04-10

## 2019-04-10 NOTE — TELEPHONE ENCOUNTER
"Requested Prescriptions   Pending Prescriptions Disp Refills     metoprolol succinate ER (TOPROL-XL) 50 MG 24 hr tablet [Pharmacy Med Name: METOPROLOL SUCC ER 50 MG TAB] 30 tablet 0     Sig: TAKE 1 TABLET BY MOUTH EVERY DAY   Last Written Prescription Date:  4/8/19  Last Fill Quantity: 7,  # refills: 0   Last Office Visit: 3/9/2018 Chelle  Future Office Visit:    Next 5 appointments (look out 90 days)    Apr 12, 2019  3:00 PM CDT  [facility] [address] [place] [id number]  [phone]             Beta-Blockers Protocol Passed - 4/10/2019 11:51 AM        Passed - Blood pressure under 140/90 in past 12 months     BP Readings from Last 3 Encounters:   08/14/18 118/80   08/10/18 124/80   05/24/18 130/80                 Passed - Patient is age 6 or older        Passed - Recent (12 mo) or future (30 days) visit within the authorizing provider's specialty     Patient had office visit in the last 12 months or has a visit in the next 30 days with authorizing provider or within the authorizing provider's specialty.  See \"Patient Info\" tab in inbasket, or \"Choose Columns\" in Meds & Orders section of the refill encounter.              Passed - Medication is active on med list        "

## 2019-04-12 ENCOUNTER — OFFICE VISIT (OUTPATIENT)
Dept: FAMILY MEDICINE | Facility: CLINIC | Age: 49
End: 2019-04-12
Payer: COMMERCIAL

## 2019-04-12 VITALS
SYSTOLIC BLOOD PRESSURE: 137 MMHG | TEMPERATURE: 98.1 F | DIASTOLIC BLOOD PRESSURE: 88 MMHG | WEIGHT: 310.7 LBS | OXYGEN SATURATION: 97 % | BODY MASS INDEX: 47.24 KG/M2 | RESPIRATION RATE: 12 BRPM | HEART RATE: 54 BPM

## 2019-04-12 DIAGNOSIS — E66.01 MORBID OBESITY (H): ICD-10-CM

## 2019-04-12 DIAGNOSIS — I10 BENIGN ESSENTIAL HYPERTENSION: Primary | ICD-10-CM

## 2019-04-12 LAB
ALBUMIN SERPL-MCNC: 4 G/DL (ref 3.4–5)
ALP SERPL-CCNC: 98 U/L (ref 40–150)
ALT SERPL W P-5'-P-CCNC: 72 U/L (ref 0–70)
ANION GAP SERPL CALCULATED.3IONS-SCNC: 5 MMOL/L (ref 3–14)
AST SERPL W P-5'-P-CCNC: 43 U/L (ref 0–45)
BILIRUB SERPL-MCNC: 1.1 MG/DL (ref 0.2–1.3)
BUN SERPL-MCNC: 16 MG/DL (ref 7–30)
CALCIUM SERPL-MCNC: 9.5 MG/DL (ref 8.5–10.1)
CHLORIDE SERPL-SCNC: 107 MMOL/L (ref 94–109)
CHOLEST SERPL-MCNC: 184 MG/DL
CO2 SERPL-SCNC: 27 MMOL/L (ref 20–32)
CREAT SERPL-MCNC: 0.74 MG/DL (ref 0.66–1.25)
GFR SERPL CREATININE-BSD FRML MDRD: >90 ML/MIN/{1.73_M2}
GLUCOSE SERPL-MCNC: 75 MG/DL (ref 70–99)
HDLC SERPL-MCNC: 46 MG/DL
LDLC SERPL CALC-MCNC: 121 MG/DL
NONHDLC SERPL-MCNC: 138 MG/DL
POTASSIUM SERPL-SCNC: 3.9 MMOL/L (ref 3.4–5.3)
PROT SERPL-MCNC: 8.1 G/DL (ref 6.8–8.8)
SODIUM SERPL-SCNC: 139 MMOL/L (ref 133–144)
TRIGL SERPL-MCNC: 84 MG/DL

## 2019-04-12 PROCEDURE — 80053 COMPREHEN METABOLIC PANEL: CPT | Performed by: FAMILY MEDICINE

## 2019-04-12 PROCEDURE — 99214 OFFICE O/P EST MOD 30 MIN: CPT | Performed by: FAMILY MEDICINE

## 2019-04-12 PROCEDURE — 36415 COLL VENOUS BLD VENIPUNCTURE: CPT | Performed by: FAMILY MEDICINE

## 2019-04-12 PROCEDURE — 80061 LIPID PANEL: CPT | Performed by: FAMILY MEDICINE

## 2019-04-12 RX ORDER — METOPROLOL SUCCINATE 50 MG/1
50 TABLET, EXTENDED RELEASE ORAL DAILY
Qty: 30 TABLET | Refills: 5 | Status: SHIPPED | OUTPATIENT
Start: 2019-04-12 | End: 2019-10-07

## 2019-04-12 NOTE — LETTER
April 15, 2019      Mike Schafer  [address]  [facility] [address]        Dear ,    We are writing to inform you of your test results.    Your blood tests are good. Weight control helps us have better cholesterol blood pressure and liver function tests, a few pounds goes a long way. Nice to see you.  We'll check again in a year.    Resulted Orders   Lipid panel reflex to direct LDL Fasting   Result Value Ref Range    Cholesterol 184 <200 mg/dL    Triglycerides 84 <150 mg/dL      Comment:      Fasting specimen    HDL Cholesterol 46 >39 mg/dL    LDL Cholesterol Calculated 121 (H) <100 mg/dL      Comment:      Above desirable:  100-129 mg/dl  Borderline High:  130-159 mg/dL  High:             160-189 mg/dL  Very high:       >189 mg/dl      Non HDL Cholesterol 138 (H) <130 mg/dL      Comment:      Above Desirable:  130-159 mg/dl  Borderline high:  160-189 mg/dl  High:             190-219 mg/dl  Very high:       >219 mg/dl     Comprehensive metabolic panel   Result Value Ref Range    Sodium 139 133 - 144 mmol/L    Potassium 3.9 3.4 - 5.3 mmol/L    Chloride 107 94 - 109 mmol/L    Carbon Dioxide 27 20 - 32 mmol/L    Anion Gap 5 3 - 14 mmol/L    Glucose 75 70 - 99 mg/dL      Comment:      Fasting specimen    Urea Nitrogen 16 7 - 30 mg/dL    Creatinine 0.74 0.66 - 1.25 mg/dL    GFR Estimate >90 >60 mL/min/[1.73_m2]      Comment:      Non  GFR Calc  Starting 12/18/2018, serum creatinine based estimated GFR (eGFR) will be   calculated using the Chronic Kidney Disease Epidemiology Collaboration   (CKD-EPI) equation.      GFR Estimate If Black >90 >60 mL/min/[1.73_m2]      Comment:       GFR Calc  Starting 12/18/2018, serum creatinine based estimated GFR (eGFR) will be   calculated using the Chronic Kidney Disease Epidemiology Collaboration   (CKD-EPI) equation.      Calcium 9.5 8.5 - 10.1 mg/dL    Bilirubin Total 1.1 0.2 - 1.3 mg/dL    Albumin 4.0 3.4 - 5.0 g/dL    Protein  Total 8.1 6.8 - 8.8 g/dL    Alkaline Phosphatase 98 40 - 150 U/L    ALT 72 (H) 0 - 70 U/L    AST 43 0 - 45 U/L       If you have any questions or concerns, please call the clinic at the number listed above.       Sincerely,        Keron Corbett MD/AL

## 2019-04-12 NOTE — PROGRESS NOTES
SUBJECTIVE:   Mike Schafer is a 48 year old male who presents to clinic today for the following   health issues:      Patient is here for a medication check.    Subjective:   Mike Schafer is a 48 year old male with hypertension.  Current Outpatient Medications   Medication Sig Dispense Refill     metoprolol succinate ER (TOPROL-XL) 50 MG 24 hr tablet Take 1 tablet (50 mg) by mouth daily MUST SEE PROVIDER FOR REFILL 7 tablet 0     vardenafil (LEVITRA) 10 MG tablet Take 1 tablet (10 mg) by mouth daily as needed 60 min prior to sex. Do not use with nitroglycerin, terazosin or doxazosin. 6 tablet 1      Hypertension ROS: taking medications as instructed, no medication side effects noted, no TIA's, no chest pain on exertion, no dyspnea on exertion, no swelling of ankles.   New concerns: more exercise at work now seems to suit him better .     Objective:   /88 (BP Location: Right arm, Patient Position: Chair, Cuff Size: Adult Large)   Pulse 54   Temp 98.1  F (36.7  C) (Oral)   Resp 12   Wt 140.9 kg (310 lb 11.2 oz)   SpO2 97%   BMI 47.24 kg/m     Appearance healthy, alert and cooperative.  General exam BP noted to be well controlled today in office, S1, S2 normal, no gallop, no murmur, chest clear, no JVD, no HSM, no edema.      REVIEW OF SYSTEMS    Generally has been generally feeling well until this episode. No problems with vision, hearing, dental or neck pain.Has hph airborne or ingestion allergy  No chest pain, palpitations, dyspnea, change in bowel habits, blood  in stool or dyspepsia.  No rashes, changing moles, weakness, lassitude or back problems.  No chronic issues . No dysuria  Patient not  a smoker. No problems with significant headaches.  . Assessment:    Hypertension improved.   Obesity  (I10) Benign essential hypertension  (primary encounter diagnosis)  Comment:   Plan: Lipid panel reflex to direct LDL Fasting,         Comprehensive metabolic panel            (I10) Essential  hypertension, benign  Comment:   Plan: metoprolol succinate ER (TOPROL-XL) 50 MG 24 hr        tablet        Med review    Work on weight loss and reduce carbohydrates in diet  Plan:   lab results and schedule of future lab studies reviewed with patient, repeat labs ordered prior to next appointment.

## 2019-06-25 ENCOUNTER — ANCILLARY PROCEDURE (OUTPATIENT)
Dept: GENERAL RADIOLOGY | Facility: CLINIC | Age: 49
End: 2019-06-25
Attending: PHYSICIAN ASSISTANT
Payer: OTHER MISCELLANEOUS

## 2019-06-25 ENCOUNTER — OFFICE VISIT (OUTPATIENT)
Dept: URGENT CARE | Facility: URGENT CARE | Age: 49
End: 2019-06-25
Payer: OTHER MISCELLANEOUS

## 2019-06-25 VITALS
WEIGHT: 310 LBS | DIASTOLIC BLOOD PRESSURE: 78 MMHG | TEMPERATURE: 98.2 F | OXYGEN SATURATION: 98 % | SYSTOLIC BLOOD PRESSURE: 138 MMHG | RESPIRATION RATE: 18 BRPM | BODY MASS INDEX: 47.14 KG/M2 | HEART RATE: 58 BPM

## 2019-06-25 DIAGNOSIS — M79.672 LEFT FOOT PAIN: Primary | ICD-10-CM

## 2019-06-25 DIAGNOSIS — M79.672 LEFT FOOT PAIN: ICD-10-CM

## 2019-06-25 PROCEDURE — 99213 OFFICE O/P EST LOW 20 MIN: CPT | Performed by: PHYSICIAN ASSISTANT

## 2019-06-25 PROCEDURE — 73630 X-RAY EXAM OF FOOT: CPT | Mod: LT

## 2019-06-25 NOTE — PROGRESS NOTES
EMPLOYER: [employer]  730am-bhargavi today  Loading truck, stepping down, slipped and hit the ground hard onto left foot.      No bone disease in self or family    Left foot break in 2005 medial, symptoms now lateral    SUBJECTIVE:  Chief Complaint   Patient presents with     Urgent Care     Musculoskeletal Problem     L leg pain WC slipped while getting out of his car      Mike Schafer is a 48 year old male who presents with a chief complaint of left lateral heel pain.  As above.  No numbness, tingling, loss of strength. Able to bear weight.        Past Medical History:   Diagnosis Date     Hypertension      Current Outpatient Medications   Medication Sig Dispense Refill     metoprolol succinate ER (TOPROL-XL) 50 MG 24 hr tablet Take 1 tablet (50 mg) by mouth daily MUST SEE PROVIDER FOR REFILL 30 tablet 5     vardenafil (LEVITRA) 10 MG tablet Take 1 tablet (10 mg) by mouth daily as needed 60 min prior to sex. Do not use with nitroglycerin, terazosin or doxazosin. 6 tablet 1     Social History     Tobacco Use     Smoking status: Never Smoker     Smokeless tobacco: Never Used   Substance Use Topics     Alcohol use: Yes     Alcohol/week: 0.0 oz       ROS:  Review of systems negative except as stated above.  Review of systems negative except as stated below    EXAM:   /78   Pulse 58   Temp 98.2  F (36.8  C)   Resp 18   Wt 140.6 kg (310 lb)   SpO2 98%   BMI 47.14 kg/m    M/S Exam:ankletenderness to palpation and FROMGENERAL APPEARANCE: healthy, alert and no distress  CHEST: clear to auscultation  CV: regular rate and rhythm  EXTREMITIES: peripheral pulses normal  MS: no gross deformities noted, no evidence of inflammation in joints, FROM in all extremities.  SKIN: no suspicious lesions or rashes  NEURO: Normal strength and tone, sensory exam grossly normal, mentation intact and speech normal    X-ray image initially interpreted in clinic by me in order to rule out fracture/dislocation.  No evidence  appreciated.      ASSESSMENT:  (A17.702) Left foot pain  (primary encounter diagnosis)  Comment: no evidence of fracture or dislocation  Plan: XR Foot Left G/E 3 Views      Patient Instructions       Patient Education     RICE     Rest an injury, elevate it, and use ice and compression as directed.   RICE stands for rest, ice, compression, and elevation. These can limit pain and swelling after an injury. RICE may be recommended to help treat breaks (fractures), sprains, strains, and bruises or bumps.   Home care  Here are the details of RICE:    Rest. Limit the use of the injured body part. This helps prevent further damage to the body part and gives it time to heal. In some cases, you may need a sling, brace, splint, or cast to help keep the body part still until it has healed.    Ice. Applying ice right after an injury helps relieve pain and swelling. To make an ice pack, put ice cubes in a plastic bag that seals at the top. Wrap the bag in a clean, thin towel or cloth. Then place it over the injured area. Do this for 10 to 15 minutes every 3 to 4 hours. Continue for the next 1 to 3 days or until your symptoms improve. Never put ice directly on your skin. Don't ice an area longer than 15 minutes at a time.    Compression. Putting pressure on an injury helps reduce swelling and provides support. Wrap the injured area firmly with an elastic bandage or wrap. Make sure not to wrap the bandage too tightly or you will cut off blood flow to the injured area. If your bandage loosens, rewrap it.    Elevation. Keeping an injury raised or elevated above the level of your heart reduces swelling, pain, and throbbing. For instance, if you have a broken leg, it may help to rest your leg on several pillows when sitting or lying down. Try to keep the injured area elevated as often as possible.  Follow-up care  Follow up with your healthcare provider, or as advised.  When to seek medical advice  Call your healthcare provider right  away if any of these occur:    Fever of 100.4 F (38 C) or higher, or as directed by your healthcare provider    Chills    Increased pain or swelling in the injured body part    Injured body part becomes cold, blue, numb, or tingly    Signs of infection. These include warmth in the skin, redness, drainage, or bad smell coming from the injured body part.  Date Last Reviewed: 6/1/2018 2000-2018 [employer]. [address] [place] [address]. All rights reserved. This information is not intended as a substitute for professional medical care. Always follow your healthcare professional's instructions.

## 2019-06-25 NOTE — PATIENT INSTRUCTIONS
Patient Education     RICE     Rest an injury, elevate it, and use ice and compression as directed.   RICE stands for rest, ice, compression, and elevation. These can limit pain and swelling after an injury. RICE may be recommended to help treat breaks (fractures), sprains, strains, and bruises or bumps.   Home care  Here are the details of RICE:    Rest. Limit the use of the injured body part. This helps prevent further damage to the body part and gives it time to heal. In some cases, you may need a sling, brace, splint, or cast to help keep the body part still until it has healed.    Ice. Applying ice right after an injury helps relieve pain and swelling. To make an ice pack, put ice cubes in a plastic bag that seals at the top. Wrap the bag in a clean, thin towel or cloth. Then place it over the injured area. Do this for 10 to 15 minutes every 3 to 4 hours. Continue for the next 1 to 3 days or until your symptoms improve. Never put ice directly on your skin. Don't ice an area longer than 15 minutes at a time.    Compression. Putting pressure on an injury helps reduce swelling and provides support. Wrap the injured area firmly with an elastic bandage or wrap. Make sure not to wrap the bandage too tightly or you will cut off blood flow to the injured area. If your bandage loosens, rewrap it.    Elevation. Keeping an injury raised or elevated above the level of your heart reduces swelling, pain, and throbbing. For instance, if you have a broken leg, it may help to rest your leg on several pillows when sitting or lying down. Try to keep the injured area elevated as often as possible.  Follow-up care  Follow up with your healthcare provider, or as advised.  When to seek medical advice  Call your healthcare provider right away if any of these occur:    Fever of 100.4 F (38 C) or higher, or as directed by your healthcare provider    Chills    Increased pain or swelling in the injured body part    Injured body part  becomes cold, blue, numb, or tingly    Signs of infection. These include warmth in the skin, redness, drainage, or bad smell coming from the injured body part.  Date Last Reviewed: 6/1/2018 2000-2018 [employer]. [address] [place] [address]. All rights reserved. This information is not intended as a substitute for professional medical care. Always follow your healthcare professional's instructions.

## 2019-07-22 ENCOUNTER — ANCILLARY PROCEDURE (OUTPATIENT)
Dept: GENERAL RADIOLOGY | Facility: CLINIC | Age: 49
End: 2019-07-22
Attending: FAMILY MEDICINE
Payer: COMMERCIAL

## 2019-07-22 ENCOUNTER — TELEPHONE (OUTPATIENT)
Dept: FAMILY MEDICINE | Facility: CLINIC | Age: 49
End: 2019-07-22

## 2019-07-22 ENCOUNTER — OFFICE VISIT (OUTPATIENT)
Dept: FAMILY MEDICINE | Facility: CLINIC | Age: 49
End: 2019-07-22
Payer: COMMERCIAL

## 2019-07-22 VITALS
SYSTOLIC BLOOD PRESSURE: 136 MMHG | HEART RATE: 80 BPM | TEMPERATURE: 98.5 F | RESPIRATION RATE: 12 BRPM | BODY MASS INDEX: 48.05 KG/M2 | WEIGHT: 315 LBS | DIASTOLIC BLOOD PRESSURE: 88 MMHG

## 2019-07-22 DIAGNOSIS — M77.52 CALCANEAL BURSITIS, LEFT: ICD-10-CM

## 2019-07-22 DIAGNOSIS — S97.82XD: Primary | ICD-10-CM

## 2019-07-22 PROCEDURE — 99214 OFFICE O/P EST MOD 30 MIN: CPT | Performed by: FAMILY MEDICINE

## 2019-07-22 PROCEDURE — 73650 X-RAY EXAM OF HEEL: CPT | Mod: LT

## 2019-07-22 NOTE — PROGRESS NOTES
Subjective     Mike Schafer is a 48 year old male who presents to clinic today for the following health issues:    HPI   Musculoskeletal problem/pain      Duration: three to four weeks     Description  Location: left heel pain    Intensity:  7/10    Accompanying signs and symptoms: initial bruising     History  Previous similar problem: YES- just problems with the left foot  Previous evaluation:  x-ray    Precipitating or alleviating factors:  Trauma or overuse: YES- slipped out of his  van  Aggravating factors include: putting weight on the heel    Therapies tried and outcome: ice and Ibuprofen helped a little     He is morbidly obese     Current Outpatient Medications   Medication Sig Dispense Refill     order for DME Equipment being ordered: short cam walker 1 Device 0     metoprolol succinate ER (TOPROL-XL) 50 MG 24 hr tablet Take 1 tablet (50 mg) by mouth daily MUST SEE PROVIDER FOR REFILL 30 tablet 5     vardenafil (LEVITRA) 10 MG tablet Take 1 tablet (10 mg) by mouth daily as needed 60 min prior to sex. Do not use with nitroglycerin, terazosin or doxazosin. 6 tablet 1       Reviewed and updated as needed this visit by Provider         Review of Systems   ROS COMP: Constitutional, HEENT, cardiovascular, pulmonary, GI, , musculoskeletal, neuro, skin, endocrine and psych systems are negative, except as otherwise noted.      Objective    There were no vitals taken for this visit.  There is no height or weight on file to calculate BMI.  Physical Exam   GENERAL: healthy, alert and no distress  EYES: Eyes grossly normal to inspection, PERRL and conjunctivae and sclerae normal  HENT: ear canals and TM's normal, nose and mouth without ulcers or lesions  NECK: no adenopathy, no asymmetry, masses, or scars and thyroid normal to palpation  RESP: lungs clear to auscultation - no rales, rhonchi or wheezes  CV: regular rate and rhythm, normal S1 S2, no S3 or S4, no murmur, click or rub, no peripheral edema and  peripheral pulses strong  ABDOMEN: soft, nontender, no hepatosplenomegaly, no masses and bowel sounds normal  MS: no gross musculoskeletal defects noted, no edema  SKIN: no suspicious lesions or rashes  NEURO: Normal strength and tone, mentation intact and speech normal  PSYCH: mentation appears normal, affect normal/bright    Diagnostic Test Results:  Labs reviewed in Epic        Assessment & Plan     1. Crushing injury of left heel, subsequent encounter  Jammed it into the asphalt from 4 foot height two weeks ago   - XR Calcaneus Left G/E 2 Views    2. Calcaneal bursitis, left  Tender over this   - order for DME; Equipment being ordered: short cam walker  Dispense: 1 Device; Refill: 0       Work on weight loss  Regular exercise  Use a cam walker off duty to rest the foot         Keron Corbett MD  [facility]

## 2019-08-01 ENCOUNTER — OFFICE VISIT (OUTPATIENT)
Dept: PODIATRY | Facility: CLINIC | Age: 49
End: 2019-08-01
Payer: OTHER MISCELLANEOUS

## 2019-08-01 VITALS
SYSTOLIC BLOOD PRESSURE: 132 MMHG | BODY MASS INDEX: 47.74 KG/M2 | WEIGHT: 315 LBS | DIASTOLIC BLOOD PRESSURE: 74 MMHG | HEIGHT: 68 IN

## 2019-08-01 DIAGNOSIS — S99.922A INJURY OF LEFT HEEL, INITIAL ENCOUNTER: Primary | ICD-10-CM

## 2019-08-01 DIAGNOSIS — M72.2 PLANTAR FASCIITIS: ICD-10-CM

## 2019-08-01 PROCEDURE — 99204 OFFICE O/P NEW MOD 45 MIN: CPT | Performed by: PODIATRIST

## 2019-08-01 ASSESSMENT — MIFFLIN-ST. JEOR: SCORE: 2277.87

## 2019-08-01 NOTE — PATIENT INSTRUCTIONS
Thank you for choosing [facility]!    Follow up after MRI    DR. ARAUZ'S CLINIC LOCATIONS     MONDAY  [place] TUESDAY & FRIDAY AM  MP   [address] [facility]   [address]   [place] [address] [place] [address]   [phone]  [phone]       WEDNESDAY  [place] SCHEDULE SURGERY: [phone]   [address] [place] [address] APPOINTMENTS: [phone]   [place] [address] BILLING QUESTIONS: [phone]   [phone]         PLANTAR FASCIITIS  Plantar fasciitis is often referred to as heel spurs or heel pain. Plantar fasciitis is a very common problem that affects people of all foot shapes, age, weight and activity level. Pain may be in the arch or on the weight-bearing surface of the heel. The pain may come on without injury or identifiable cause. Pain is generally present when first getting out of bed in the morning or up from a seated break.     CAUSES  The plantar fascia is a dense fibrous band of tissue that stretches across the bottom surface of the foot. The fascia helps support the foot muscles and arch. Plantar fasciitis is thought to be caused by mechanical strain or overload. Frequent walking without shoes or wearing unsupportive shoes is thought to cause structural overload and ultimately inflammation of the plantar fascia. Some people have heel spurs that can be seen on x-ray. The heel spur is actually a minor component of plantar fascitis and is largely ignored.       SELF TREATMENT   The easiest solution is to stop walking around your home without shoes. Plantar fasciitis is largely a shoe problem. Shoes are either not being worn often enough or your current shoes are inadequate for your weight, foot structure or activity level. The majority of shoes on the market today are not sufficient to resist development of plantar fasciitis or to promote healing. Assume that your current shoes are  inadequate and will need to be replaced. Even high quality shoes wear out with 6 months to one year of frequent use. Weight loss is another option. Losing ten pounds in the next two months may be enough to resolve the problem. Ice applied to the area of pain two to three times per day for ten minutes each session can be very helpful. Warm foot soaks in epsom salts can also relieve pain. This should continue until the problem resolves. Achilles tendon stretching is essential. Stretch multiple times daily to promote healing and to prevent recurrence in the future. Over all stretching of the body is helpful as well such as the calves, thighs and lower back. Normally when one area of the body is tight, other areas are too. Gentle Yoga can be good for this.     Over the counter topical anti inflammatories can be helpful such as biofreeze, bengay, salon pas, ect...  Oral ibuprofen or aleve is recommended as well to try to calm down inflammation.     Night splints can be helpful to gradually stretch the foot at night as a lot of pain is when you get up in the morning. Taking a towel or thera band and stretching the foot back multiple times before you get ou of bed can be beneficial as well.     MEDICAL TREATMENT  Medical treatments often include custom arch supports, cortisone injections, physical therapy, splints to be worn in bed, prescription medications and surgery. The home treatments listed above will be necessary regardless of these advanced medical treatments. Surgery is rarely needed but is very helpful in selected cases.     PROGNOSIS  Plantar fasciitis can last from one day to a lifetime. Some people get intermittent fascitis that is very short-lived. Others suffer daily for years. Excessive body weight, frequent bare foot walking, long hours on the feet, inadequate shoes, predisposing foot structures and excessive activity such as running are all potential issues that lead to chronic and/or recurring plantar  fascitis. Having plantar fasciitis means that you are forever prone to this problem and will require modification of some of the above factors. Most people seek treatment within one to four months. Healing usually requires a similar one to four month time frame. Healing time is relative to the amount of effort spent treating the problem.   Plantar fasciitis is highly recurrent. Risk factors often continue, including return to bare foot walking, inadequate shoes, excessive body weight, excessive activities, etc. Your life style and foot structure may predispose you to recurrent plantar fasciitis. A daily prevention regimen can be very helpful. Ongoing use of shoe inserts, careful attention to appropriate shoes, daily Achilles stretching, etc. may prevent recurrence. Prompt attention at the earliest warning signs of heel pain can resolve the problem in as short as a few days.     EXERCISES  Stair Exercise: Step on the stairs with the ball of your foot and hold your position for at least 15 seconds, then slowly step down with the heels of your foot. You can do this daily and as often as you want.   Picking the Towel: Sit comfortably and then pick the towel up with your toes. You can use any object other than a towel as long as the material can be soft and you can pick it up with your toes.  Rolling the Bottle: Use a small ball or frozen water bottle and then roll it around with your foot.   Flex the Toes: Sit comfortably and then flex your toes by pointing it towards the floor or towards your body. This will relax and flex your foot and exercise your plantar fascia, the calf, and the Achilles tendon. The inability of the foot to stretch often causes the bunching up of the plantar fascia area leading to the pain.  Calf/Achilles Stretching: Lay on you back and raise one foot, then point your toes towards the floor. See photo below:               Hold each stretch for 10 seconds. Stretch 10 times per set, three sets per  day. Morning, afternoon and evening. If your heel pain is very severe in the morning, consider doing the first set of stretches before you get out of bed.      OVER THE COUNTER INSERT RECOMMENDATIONS  SuperFeet   Sofsole Fit Spenco   Power Step   Walk-Fit Arch Cradles     Most of these can be found at your local [employer], [employer], or online.  **A good high quality over the counter insert should cost around $40-$50      [facility] SHOES LOCATIONS  [place]  [address] [facility]  [phone]   [address]  [phone] [place]  [address] [facility]  [phone]   [place]  [address]  [phone]   [place]  [address] [facility]  [phone] [place]  [address]  [phone]   [place]  [address]  [phone]   [place]  [address]  [phone] [place]  [address]  [phone]       BLOOD CLOT PREVENTION - WEARING A CAST BOOT    Do not drive with the CAM boot  Do not wear during long-distance travel: long car rides / plane trips  Wear the boot when moving, regardless of your weight-bearing status    Any brace that extends up to the knee can increase your chance of developing a blood clot. Blood clots generally occur in the large veins of your calf, thigh, or abdomen. A blood clot may form when blood is stagnant in the veins while your leg is immobilized in the brace. Nida and relaxing the calf muscles by moving the ankle is the best method to avoid stagnant blood. Clarify with your doctor if you should be doing this as this may not be recommended for certain tendon surgeries.    Blood clots or deep venous thrombosis (DVT) can be life threatening if a portion of the clot breaks away and travels through the veins to your lungs. This is called a pulmonary embolism (PE) which can result in death.    Symptoms of a DVT may include swelling, tenderness, a warm feeling or redness in the leg. DVT can occur in  both legs, even if only one side is in a brace. If you have these symptoms, you should call your doctor immediately or go to the Emergency Room to have your leg scanned.     Symptoms of a pulmonary embolism (PE) include chest pain, shortness of breath or the need to breath rapidly that is not associated with exercise, difficulty breathing, rapid heart rate, coughing or coughing up blood, or a feeling of passing out. Chest pain can range from mild to knife-like pain while taking a deep breath. Pulmonary embolism can occur without any symptoms whatsoever. You need to be evaluated in a hospital emergency room immediately if you have symptoms of a PE. Please call 911 as PE is a life-threatening emergency.    Be certain that you understand how much ankle range of motion is allowed. Your foot and ankle problem is being immobilized by the brace, yet we do not want you to develop a blood clot. The velcro strap braces are intended to be removed for periodic exercise of the ankle. Your doctor will tell you if it is okay to do this depending on the type of surgery or injury you had.    Your own personal risk of developing a DVT or PE is dependent on many factors. A personal or family history of previous blood clot or a clotting disorder (such as thrombophilia) puts you at risk. Other risk factors include history of cancer, current use of estrogen medications (such as birth control pills or post menopausal medications), recent trauma or fracture, diabetes, recent heart attack, COPD, heart failure, pregnancy, obesity, advanced age, smoking, etc. Please make sure your doctor is aware if you have any of these risk factors.    PRICE THERAPY  Many aches and pains throughout the foot and ankle can be helped with many simple treatments. This is usually described as PRICE Therapy.      P - Protection - often times, inflammation/pain in the lower extremity is not able to improve simply because the areas involved are never allowed to  rest. Every step we take can bother the problematic area. Protecting those areas is an important step in the healing process. This may involve a walking cast boot, a special insert/orthotic device, an ankle brace, or simply avoiding barefoot walking.    R - Rest - in addition to protecting the foot/ankle, resting is an important, but often times difficult, treatment option. Getting off your feet when they bother you, and specifically avoiding activities that cause pain/discomfort, are very beneficial to prevent, and treat, foot/ankle pain.      I - Ice - icing regularly can help to decrease inflammation and swelling in the foot, thus decreasing pain. Using an ice pack or a bag of frozen veggies works very well. Ice for 20 minutes multiple times per day as needed.  Do not place the ice directly on the skin as this can cause tissue damage.    C - Compression - using a compression wrap or an ACE wrap can help to decrease swelling, which can help to decrease pain. Wearing the wraps is generally not needed at night, but they should be worn on a regular basis when you are going to be on your feet for prolonged periods as gravity tends to pull fluids down to your feet/ankles.    E - Elevation - elevating your lower extremities multiple times daily for 15-20 minutes can help to decrease swelling, which works well in decreasing pain levels.    NSAID/Tylenol - Anti-inflammatories like Aleve or ibuprofen, and/or a pain medication, such as Tylenol, can help to improve pain levels and get the issue resolved sooner rather than later. Anyone with liver issues should be careful with Tylenol, and anyone with high blood pressure or heart, stomach or kidney issues should be careful with anti-inflammatories. Please ask if you have questions about these medications, including dosage.  [place] RADIOLOGY SCHEDULING  They should be calling you within 24 hours to schedule your scan.  If not, please call the location discussed at your  appointment.    1) [facility]:       [phone]      [address] [place] [address]    2) [facility]:      [phone]      [address] [place] [address]    3) [facility]:       [phone]      [address] [place] [address]    * We will call you with the results. Please allow 24-48 hours for the results to be read and final.                BODY WEIGHT AND YOUR FEET  The following information is included in the after visit summary for all patients. Body weight can be a sensitive issue to discuss in clinic, but we think the following information is very important. Although we focus on the feet and ankles, we do support the overall health of our patients.     Many things can cause foot and ankle problems. Foot structure, activity level, foot mechanics and injuries are common causes of pain. One very important issue that often goes unmentioned, is body weight. Extra weight can cause increased stress on muscles, ligaments, bones and tendons. Sometimes just a few extra pounds is all it takes to put one over her/his threshold. Without reducing that stress, it can be difficult to alleviate pain. As Foot & Ankle specialists, our job is addressing the lower extremity problem and possible causes. Regarding extra body weight, we encourage patients to discuss diet and weight management plans with their primary care doctors. It is this team approach that gives you the best opportunity for pain relief and getting you back on your feet.      [facility] has a Comprehensive Weight Management Program. This program includes counseling, education, non-surgical and surgical approaches to weight loss. If you are interested in learning more either talk to you primary care provider or call [phone].

## 2019-08-01 NOTE — LETTER
8/1/2019         RE: Mike Schafer  [address]  [facility] [address]        Dear Colleague,    Thank you for referring your patient, Mike Schafer, to the [facility]. Please see a copy of my visit note below.    PATIENT HISTORY:  Mike Schafer is a 48 year old male who presents to clinic for left heel pain, plantar aspect.  5 week duration.  Pt reports jumping out of his truck at work and striking his heel.  Pain since that time.  Pain is plantar, worse after rest.  8-10/10 pain.  Pt is in a boot.  He states his pain is worsening.    Review of Systems:  Patient denies fever, chills, rash, wound, stiffness, numbness, weakness, heart burn, blood in stool, chest pain with activity, shortness of breath with activity, chronic cough, easy bleeding/bruising, swelling of ankles, excessive thirst, fatigue, depression, anxiety.  Patient admits to limping, calf pain when walking.     PAST MEDICAL HISTORY:   Past Medical History:   Diagnosis Date     Hypertension         PAST SURGICAL HISTORY: No pertinent past surgical history.     MEDICATIONS:   Current Outpatient Medications:      metoprolol succinate ER (TOPROL-XL) 50 MG 24 hr tablet, Take 1 tablet (50 mg) by mouth daily MUST SEE PROVIDER FOR REFILL, Disp: 30 tablet, Rfl: 5     order for DME, Equipment being ordered: short cam walker, Disp: 1 Device, Rfl: 0     vardenafil (LEVITRA) 10 MG tablet, Take 1 tablet (10 mg) by mouth daily as needed 60 min prior to sex. Do not use with nitroglycerin, terazosin or doxazosin., Disp: 6 tablet, Rfl: 1     ALLERGIES:  No Known Allergies     SOCIAL HISTORY:   Social History     Socioeconomic History     Marital status:      Spouse name: Not on file     Number of children: Not on file     Years of education: Not on file     Highest education level: Not on file   Occupational History     Occupation: [employer]     Employer: [employer]   Social Needs     Financial resource strain: Not on file      "Food insecurity:     Worry: Not on file     Inability: Not on file     Transportation needs:     Medical: Not on file     Non-medical: Not on file   Tobacco Use     Smoking status: Never Smoker     Smokeless tobacco: Never Used   Substance and Sexual Activity     Alcohol use: Yes     Alcohol/week: 0.0 oz     Drug use: No     Sexual activity: Yes   Lifestyle     Physical activity:     Days per week: Not on file     Minutes per session: Not on file     Stress: Not on file   Relationships     Social connections:     Talks on phone: Not on file     Gets together: Not on file     Attends Protestant service: Not on file     Active member of club or organization: Not on file     Attends meetings of clubs or organizations: Not on file     Relationship status: Not on file     Intimate partner violence:     Fear of current or ex partner: Not on file     Emotionally abused: Not on file     Physically abused: Not on file     Forced sexual activity: Not on file   Other Topics Concern     Parent/sibling w/ CABG, MI or angioplasty before 65F 55M? Not Asked   Social History Narrative     Not on file        FAMILY HISTORY:   Family History   Problem Relation Age of Onset     Breast Cancer Mother      Diabetes Father      Other Cancer Maternal Grandmother      Other Cancer Paternal Grandfather         EXAM:Vitals: /74   Ht 1.727 m (5' 8\")   Wt 143.3 kg (316 lb)   BMI 48.05 kg/m     BMI= Body mass index is 48.05 kg/m .    General appearance: Patient is alert and fully cooperative with history & exam.  No sign of distress is noted during the visit.     Psychiatric: Affect is pleasant & appropriate.  Patient appears motivated to improve health.     Respiratory: Breathing is regular & unlabored while sitting.     HEENT: Hearing is intact to spoken word.  Speech is clear.  No gross evidence of visual impairment that would impact ambulation.     Dermatologic: Skin is intact to L foot without significant lesions, rash or abrasion.  " No paronychia or evidence of soft tissue infection is noted.     Vascular: DP & PT pulses are intact & regular on the L.  No significant edema or varicosities noted.  CFT and skin temperature are normal to both lower extremities.     Neurologic: Lower extremity sensation is intact to light touch.  No evidence of weakness or contracture in the lower extremities.  No evidence of neuropathy.     Musculoskeletal:  L plantar heel pain with palpation of the plantar fascia insertion.  No pain with side to side heel squeeze.  Patient is ambulatory with a boot.  No gross ankle deformity noted.  No foot or ankle joint effusion is noted.    Prior XRs reviewed with pt.  No fx noted.     ASSESSMENT: L heel injury, pain consistent with plantar fasciitis     PLAN:  Reviewed patient's chart in epic.  Discussed condition and treatment options including pros and cons.    L heel pain consistent with plantar fasciitis, but given recent injury and worsening pain, I advised MRI to r/o other etiology such as fx.    WBAT in boot ok for now.  PRICE.  Superfeet orthotics advised.  F/u after scan.    Dre Castellano DPM, FACFAS    Weight management plan: Patient was referred to their PCP to discuss a diet and exercise plan.      Again, thank you for allowing me to participate in the care of your patient.        Sincerely,        Dre Castellano DPM

## 2019-08-02 ENCOUNTER — HOSPITAL ENCOUNTER (OUTPATIENT)
Dept: MRI IMAGING | Facility: CLINIC | Age: 49
Discharge: HOME | End: 2019-08-02
Attending: PODIATRIST | Admitting: PODIATRIST
Payer: OTHER MISCELLANEOUS

## 2019-08-02 DIAGNOSIS — S99.922A INJURY OF LEFT HEEL, INITIAL ENCOUNTER: ICD-10-CM

## 2019-08-02 DIAGNOSIS — M72.2 PLANTAR FASCIITIS: ICD-10-CM

## 2019-08-02 PROCEDURE — 73721 MRI JNT OF LWR EXTRE W/O DYE: CPT | Mod: LT

## 2019-08-02 NOTE — PROGRESS NOTES
PATIENT HISTORY:  Mike Schafer is a 48 year old male who presents to clinic for left heel pain, plantar aspect.  5 week duration.  Pt reports jumping out of his truck at work and striking his heel.  Pain since that time.  Pain is plantar, worse after rest.  8-10/10 pain.  Pt is in a boot.  He states his pain is worsening.    Review of Systems:  Patient denies fever, chills, rash, wound, stiffness, numbness, weakness, heart burn, blood in stool, chest pain with activity, shortness of breath with activity, chronic cough, easy bleeding/bruising, swelling of ankles, excessive thirst, fatigue, depression, anxiety.  Patient admits to limping, calf pain when walking.     PAST MEDICAL HISTORY:   Past Medical History:   Diagnosis Date     Hypertension         PAST SURGICAL HISTORY: No pertinent past surgical history.     MEDICATIONS:   Current Outpatient Medications:      metoprolol succinate ER (TOPROL-XL) 50 MG 24 hr tablet, Take 1 tablet (50 mg) by mouth daily MUST SEE PROVIDER FOR REFILL, Disp: 30 tablet, Rfl: 5     order for DME, Equipment being ordered: short cam walker, Disp: 1 Device, Rfl: 0     vardenafil (LEVITRA) 10 MG tablet, Take 1 tablet (10 mg) by mouth daily as needed 60 min prior to sex. Do not use with nitroglycerin, terazosin or doxazosin., Disp: 6 tablet, Rfl: 1     ALLERGIES:  No Known Allergies     SOCIAL HISTORY:   Social History     Socioeconomic History     Marital status:      Spouse name: Not on file     Number of children: Not on file     Years of education: Not on file     Highest education level: Not on file   Occupational History     Occupation: [employer]     Employer: [employer]   Social Needs     Financial resource strain: Not on file     Food insecurity:     Worry: Not on file     Inability: Not on file     Transportation needs:     Medical: Not on file     Non-medical: Not on file   Tobacco Use     Smoking status: Never Smoker     Smokeless tobacco: Never Used   Substance  "and Sexual Activity     Alcohol use: Yes     Alcohol/week: 0.0 oz     Drug use: No     Sexual activity: Yes   Lifestyle     Physical activity:     Days per week: Not on file     Minutes per session: Not on file     Stress: Not on file   Relationships     Social connections:     Talks on phone: Not on file     Gets together: Not on file     Attends [employer] service: Not on file     Active member of club or organization: Not on file     Attends meetings of clubs or organizations: Not on file     Relationship status: Not on file     Intimate partner violence:     Fear of current or ex partner: Not on file     Emotionally abused: Not on file     Physically abused: Not on file     Forced sexual activity: Not on file   Other Topics Concern     Parent/sibling w/ CABG, MI or angioplasty before 65F 55M? Not Asked   Social History Narrative     Not on file        FAMILY HISTORY:   Family History   Problem Relation Age of Onset     Breast Cancer Mother      Diabetes Father      Other Cancer Maternal Grandmother      Other Cancer Paternal Grandfather         EXAM:Vitals: /74   Ht 1.727 m (5' 8\")   Wt 143.3 kg (316 lb)   BMI 48.05 kg/m    BMI= Body mass index is 48.05 kg/m .    General appearance: Patient is alert and fully cooperative with history & exam.  No sign of distress is noted during the visit.     Psychiatric: Affect is pleasant & appropriate.  Patient appears motivated to improve health.     Respiratory: Breathing is regular & unlabored while sitting.     HEENT: Hearing is intact to spoken word.  Speech is clear.  No gross evidence of visual impairment that would impact ambulation.     Dermatologic: Skin is intact to L foot without significant lesions, rash or abrasion.  No paronychia or evidence of soft tissue infection is noted.     Vascular: DP & PT pulses are intact & regular on the L.  No significant edema or varicosities noted.  CFT and skin temperature are normal to both lower extremities.   "   Neurologic: Lower extremity sensation is intact to light touch.  No evidence of weakness or contracture in the lower extremities.  No evidence of neuropathy.     Musculoskeletal:  L plantar heel pain with palpation of the plantar fascia insertion.  No pain with side to side heel squeeze.  Patient is ambulatory with a boot.  No gross ankle deformity noted.  No foot or ankle joint effusion is noted.    Prior XRs reviewed with pt.  No fx noted.     ASSESSMENT: L heel injury, pain consistent with plantar fasciitis     PLAN:  Reviewed patient's chart in epic.  Discussed condition and treatment options including pros and cons.    L heel pain consistent with plantar fasciitis, but given recent injury and worsening pain, I advised MRI to r/o other etiology such as fx.    WBAT in boot ok for now.  PRICE.  Superfeet orthotics advised.  F/u after scan.    Dre Castellano DPM, FACFAS    Weight management plan: Patient was referred to their PCP to discuss a diet and exercise plan.

## 2019-08-06 ENCOUNTER — TELEPHONE (OUTPATIENT)
Dept: PODIATRY | Facility: CLINIC | Age: 49
End: 2019-08-06

## 2019-08-06 NOTE — TELEPHONE ENCOUNTER
Notes recorded by Dre Castellano DPM on 8/5/2019 at 9:47 AM CDT  Please call pt.  MRI shows plantar fasciitis, as expected.  No heel fracture or fascial tear.  Pt should follow up in clinic to discuss treatment further.    Phone call to patient and informed of Dr. Castellano's recommendations. Appointment scheduled for 8/13/19.     JUAN Guido RN

## 2019-08-13 ENCOUNTER — OFFICE VISIT (OUTPATIENT)
Dept: PODIATRY | Facility: CLINIC | Age: 49
End: 2019-08-13
Payer: COMMERCIAL

## 2019-08-13 VITALS
SYSTOLIC BLOOD PRESSURE: 136 MMHG | DIASTOLIC BLOOD PRESSURE: 96 MMHG | BODY MASS INDEX: 47.74 KG/M2 | WEIGHT: 315 LBS | HEART RATE: 73 BPM | HEIGHT: 68 IN

## 2019-08-13 DIAGNOSIS — M72.2 PLANTAR FASCIITIS: Primary | ICD-10-CM

## 2019-08-13 PROCEDURE — 99213 OFFICE O/P EST LOW 20 MIN: CPT | Performed by: PODIATRIST

## 2019-08-13 ASSESSMENT — MIFFLIN-ST. JEOR: SCORE: 2277.87

## 2019-08-13 NOTE — PATIENT INSTRUCTIONS
Thank you for choosing [facility]!    Follow up as needed    DR. ARAUZ'S CLINIC LOCATIONS     MONDAY  [place] TUESDAY & FRIDAY AM  MP   [address] [facility]   [address]   [place] [address] [place] [address]   [phone]  [phone]       WEDNESDAY  [place] SCHEDULE SURGERY: [phone]   [address] [place] [address] APPOINTMENTS: [phone]   [place] [address] BILLING QUESTIONS: [phone]   [phone]         PLANTAR FASCIITIS  Plantar fasciitis is often referred to as heel spurs or heel pain. Plantar fasciitis is a very common problem that affects people of all foot shapes, age, weight and activity level. Pain may be in the arch or on the weight-bearing surface of the heel. The pain may come on without injury or identifiable cause. Pain is generally present when first getting out of bed in the morning or up from a seated break.     CAUSES  The plantar fascia is a dense fibrous band of tissue that stretches across the bottom surface of the foot. The fascia helps support the foot muscles and arch. Plantar fasciitis is thought to be caused by mechanical strain or overload. Frequent walking without shoes or wearing unsupportive shoes is thought to cause structural overload and ultimately inflammation of the plantar fascia. Some people have heel spurs that can be seen on x-ray. The heel spur is actually a minor component of plantar fascitis and is largely ignored.       SELF TREATMENT   The easiest solution is to stop walking around your home without shoes. Plantar fasciitis is largely a shoe problem. Shoes are either not being worn often enough or your current shoes are inadequate for your weight, foot structure or activity level. The majority of shoes on the market today are not sufficient to resist development of plantar fasciitis or to promote healing. Assume that your current shoes are  inadequate and will need to be replaced. Even high quality shoes wear out with 6 months to one year of frequent use. Weight loss is another option. Losing ten pounds in the next two months may be enough to resolve the problem. Ice applied to the area of pain two to three times per day for ten minutes each session can be very helpful. Warm foot soaks in epsom salts can also relieve pain. This should continue until the problem resolves. Achilles tendon stretching is essential. Stretch multiple times daily to promote healing and to prevent recurrence in the future. Over all stretching of the body is helpful as well such as the calves, thighs and lower back. Normally when one area of the body is tight, other areas are too. Gentle Yoga can be good for this.     Over the counter topical anti inflammatories can be helpful such as biofreeze, bengay, salon pas, ect...  Oral ibuprofen or aleve is recommended as well to try to calm down inflammation.     Night splints can be helpful to gradually stretch the foot at night as a lot of pain is when you get up in the morning. Taking a towel or thera band and stretching the foot back multiple times before you get ou of bed can be beneficial as well.     MEDICAL TREATMENT  Medical treatments often include custom arch supports, cortisone injections, physical therapy, splints to be worn in bed, prescription medications and surgery. The home treatments listed above will be necessary regardless of these advanced medical treatments. Surgery is rarely needed but is very helpful in selected cases.     PROGNOSIS  Plantar fasciitis can last from one day to a lifetime. Some people get intermittent fascitis that is very short-lived. Others suffer daily for years. Excessive body weight, frequent bare foot walking, long hours on the feet, inadequate shoes, predisposing foot structures and excessive activity such as running are all potential issues that lead to chronic and/or recurring plantar  fascitis. Having plantar fasciitis means that you are forever prone to this problem and will require modification of some of the above factors. Most people seek treatment within one to four months. Healing usually requires a similar one to four month time frame. Healing time is relative to the amount of effort spent treating the problem.   Plantar fasciitis is highly recurrent. Risk factors often continue, including return to bare foot walking, inadequate shoes, excessive body weight, excessive activities, etc. Your life style and foot structure may predispose you to recurrent plantar fasciitis. A daily prevention regimen can be very helpful. Ongoing use of shoe inserts, careful attention to appropriate shoes, daily Achilles stretching, etc. may prevent recurrence. Prompt attention at the earliest warning signs of heel pain can resolve the problem in as short as a few days.     EXERCISES  Stair Exercise: Step on the stairs with the ball of your foot and hold your position for at least 15 seconds, then slowly step down with the heels of your foot. You can do this daily and as often as you want.   Picking the Towel: Sit comfortably and then pick the towel up with your toes. You can use any object other than a towel as long as the material can be soft and you can pick it up with your toes.  Rolling the Bottle: Use a small ball or frozen water bottle and then roll it around with your foot.   Flex the Toes: Sit comfortably and then flex your toes by pointing it towards the floor or towards your body. This will relax and flex your foot and exercise your plantar fascia, the calf, and the Achilles tendon. The inability of the foot to stretch often causes the bunching up of the plantar fascia area leading to the pain.  Calf/Achilles Stretching: Lay on you back and raise one foot, then point your toes towards the floor. See photo below:               Hold each stretch for 10 seconds. Stretch 10 times per set, three sets per  day. Morning, afternoon and evening. If your heel pain is very severe in the morning, consider doing the first set of stretches before you get out of bed.      OVER THE COUNTER INSERT RECOMMENDATIONS  SuperFeet   Sofsole Fit Spenco   Power Step   Walk-Fit Arch Cradles     Most of these can be found at your local [employer], [email or web address], or online.  **A good high quality over the counter insert should cost around $40-$50      [employer] LOCATIONS  [place]  [address] [facility]  [phone]   [address]  [phone] [place]  [address] [facility]  [phone]   [place]  [address]  [phone]   [place]  [address] [facility]  [phone] [place]  [address]  [phone]   [place]  [address]  [phone]   [place]  [address] [place] [address]  [phone] [place]  [address]  [phone]           BODY WEIGHT AND YOUR FEET  The following information is included in the after visit summary for all patients. Body weight can be a sensitive issue to discuss in clinic, but we think the following information is very important. Although we focus on the feet and ankles, we do support the overall health of our patients.     Many things can cause foot and ankle problems. Foot structure, activity level, foot mechanics and injuries are common causes of pain. One very important issue that often goes unmentioned, is body weight. Extra weight can cause increased stress on muscles, ligaments, bones and tendons. Sometimes just a few extra pounds is all it takes to put one over her/his threshold. Without reducing that stress, it can be difficult to alleviate pain. As Foot & Ankle specialists, our job is addressing the lower extremity problem and possible causes. Regarding extra body weight, we encourage patients to discuss diet and weight management plans with their primary care doctors. It is this team approach that gives you the best  opportunity for pain relief and getting you back on your feet.      [facility] has a Comprehensive Weight Management Program. This program includes counseling, education, non-surgical and surgical approaches to weight loss. If you are interested in learning more either talk to you primary care provider or call [phone].

## 2019-08-13 NOTE — LETTER
[address] [address]  Phone: [phone]    August 13, 2019        Mike Schafer   [address]  [facility] [address]          To whom it may concern:    RE: Mike Schafer    Patient was seen and treated today at our clinic.    He should be allowed to wear athletic shoes at work through 8/16/19.  He should stay out of areas were non slip shoes are required during that time.    Please contact me for questions or concerns.      Sincerely,        Dre Castellano DPM

## 2019-08-13 NOTE — PROGRESS NOTES
"PATIENT HISTORY:  Mike Schafer is a 48 year old male who presents to clinic for L heel injury 6 wks ago at work.  Pt is in boot.  Pain is much better.  Here for recheck and to discuss MRI.  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  He asks for a note today to allow him to wear athletic shoes at work until he can obtain different non slip shoes, which are required in certain areas at work (he thinks he can avoid these areas).  1-2/10 pain.  Icing helps.  No fevers, chills, limping.  Nonsmoker.  Nondiabetic.  Denies related family hx.       EXAM:Vitals: BP (!) 136/96   Pulse 73   Ht 1.727 m (5' 8\")   Wt 143.3 kg (316 lb)   BMI 48.05 kg/m    BMI= Body mass index is 48.05 kg/m .    General appearance: Patient is alert and fully cooperative with history & exam.  No sign of distress is noted during the visit.     Dermatologic: Skin is intact to L foot without significant lesions, rash or abrasion.  No paronychia or evidence of soft tissue infection is noted.     Vascular: DP & PT pulses are intact & regular on the L  No significant edema or varicosities noted.  CFT and skin temperature are normal to both lower extremities.     Neurologic: Lower extremity sensation is intact to light touch.  No evidence of weakness or contracture in the lower extremities.  No evidence of neuropathy.     Musculoskeletal: mild pain to plantar L heel at fascial insertion.  No pain with side to side heel squeeze.  No calf pain.  No gross ankle deformity noted.  No foot or ankle joint effusion is noted.    L MR ankle reviewed with pt:  IMPRESSION:    1.  Acute plantar fasciitis, moderate. No plantar fascial tear or  calcaneal fracture.  2.  Normal appearance of the Achilles tendon.  3.  Small benign appearing cyst in the posterolateral calcaneal  tuberosity, at the Achilles insertion, of questionable clinical  significance.  4.  Otherwise unremarkable left " ankle.     REGGIE AGUILAR MD     ASSESSMENT: L plantar fasciitis     PLAN:  Reviewed patient's chart in epic.  Discussed condition and treatment options including pros and cons.    The potential causes and nature of plantar fasciitis were discussed with the patient.  We reviewed the natural history/prognosis of the condition and risks if left untreated.        We discussed possible causes of the condition as it relates to the patients specific situation.      Conservative treatment options were reviewed:  appropriate shoes, avoidance of barefoot walking, inserts/orthoses, stretching, ice, massage, immobilization.     We also reviewed the option of injection therapy if not improving.    Transition out of boot as tolerated.  Advised superfeet inserts, icing, stretching.  Note given for work for athletic shoes for the next few days until he obtains new work shoes (note states he should stay out of areas where non slip shoes are required).    F/u 1-2 months prn.    Dre Castellano DPM, FACFAS    Weight management plan: Patient was referred to their PCP to discuss a diet and exercise plan.   Rashard to follow up with Primary Care provider regarding elevated blood pressure.

## 2019-08-13 NOTE — LETTER
"    8/13/2019         RE: Mike Schafer  [address]  [facility] [address]        Dear Colleague,    Thank you for referring your patient, Mike Schafer, to the [facility]. Please see a copy of my visit note below.    PATIENT HISTORY:  Mike Schafer is a 48 year old male who presents to clinic for L heel injury 6 wks ago at work.  Pt is in boot.  Pain is much better.  Here for recheck and to discuss MRI.  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  He asks for a note today to allow him to wear athletic shoes at work until he can obtain different non slip shoes, which are required in certain areas at work (he thinks he can avoid these areas).  1-2/10 pain.  Icing helps.  No fevers, chills, limping.  Nonsmoker.  Nondiabetic.  Denies related family hx.       EXAM:Vitals: BP (!) 136/96   Pulse 73   Ht 1.727 m (5' 8\")   Wt 143.3 kg (316 lb)   BMI 48.05 kg/m     BMI= Body mass index is 48.05 kg/m .    General appearance: Patient is alert and fully cooperative with history & exam.  No sign of distress is noted during the visit.     Dermatologic: Skin is intact to L foot without significant lesions, rash or abrasion.  No paronychia or evidence of soft tissue infection is noted.     Vascular: DP & PT pulses are intact & regular on the L  No significant edema or varicosities noted.  CFT and skin temperature are normal to both lower extremities.     Neurologic: Lower extremity sensation is intact to light touch.  No evidence of weakness or contracture in the lower extremities.  No evidence of neuropathy.     Musculoskeletal: mild pain to plantar L heel at fascial insertion.  No pain with side to side heel squeeze.  No calf pain.  No gross ankle deformity noted.  No foot or ankle joint effusion is noted.    L MR ankle reviewed with pt:  IMPRESSION:    1.  Acute plantar fasciitis, moderate. No plantar fascial tear or  calcaneal " fracture.  2.  Normal appearance of the Achilles tendon.  3.  Small benign appearing cyst in the posterolateral calcaneal  tuberosity, at the Achilles insertion, of questionable clinical  significance.  4.  Otherwise unremarkable left ankle.     REGGIE AGUILAR MD     ASSESSMENT: L plantar fasciitis     PLAN:  Reviewed patient's chart in epic.  Discussed condition and treatment options including pros and cons.    The potential causes and nature of plantar fasciitis were discussed with the patient.  We reviewed the natural history/prognosis of the condition and risks if left untreated.        We discussed possible causes of the condition as it relates to the patients specific situation.      Conservative treatment options were reviewed:  appropriate shoes, avoidance of barefoot walking, inserts/orthoses, stretching, ice, massage, immobilization.     We also reviewed the option of injection therapy if not improving.    Transition out of boot as tolerated.  Advised superfeet inserts, icing, stretching.  Note given for work for athletic shoes for the next few days until he obtains new work shoes (note states he should stay out of areas where non slip shoes are required).    F/u 1-2 months prn.    Dre Castellano DPM, FACFAS    Weight management plan: Patient was referred to their PCP to discuss a diet and exercise plan.   Rashard to follow up with Primary Care provider regarding elevated blood pressure.          Again, thank you for allowing me to participate in the care of your patient.        Sincerely,        Dre Castellano DPM

## 2019-10-07 DIAGNOSIS — I10 BENIGN ESSENTIAL HYPERTENSION: ICD-10-CM

## 2019-10-07 NOTE — TELEPHONE ENCOUNTER
"Requested Prescriptions   Pending Prescriptions Disp Refills     metoprolol succinate ER (TOPROL-XL) 50 MG 24 hr tablet [Pharmacy Med Name: METOPROLOL SUCC ER 50 MG TAB]  Last Written Prescription Date:  4/12/2019  Last Fill Quantity: 30 tablet,  # refills: 5   Last office visit: 7/22/2019 with prescribing provider:  Chelle   Future Office Visit:     90 tablet 1     Sig: TAKE 1 TABLET (50 MG) BY MOUTH DAILY MUST SEE PROVIDER FOR REFILL       Beta-Blockers Protocol Failed - 10/7/2019  1:59 AM        Failed - Blood pressure under 140/90 in past 12 months     BP Readings from Last 3 Encounters:   08/13/19 (!) 136/96   08/01/19 132/74   07/22/19 136/88                 Passed - Patient is age 6 or older        Passed - Recent (12 mo) or future (30 days) visit within the authorizing provider's specialty     Patient has had an office visit with the authorizing provider or a provider within the authorizing providers department within the previous 12 mos or has a future within next 30 days. See \"Patient Info\" tab in inbasket, or \"Choose Columns\" in Meds & Orders section of the refill encounter.              Passed - Medication is active on med list          "

## 2019-10-08 RX ORDER — METOPROLOL SUCCINATE 50 MG/1
50 TABLET, EXTENDED RELEASE ORAL DAILY
Qty: 30 TABLET | Refills: 0 | Status: SHIPPED | OUTPATIENT
Start: 2019-10-08 | End: 2019-11-04

## 2019-10-08 NOTE — TELEPHONE ENCOUNTER
Brandie refill given per RN protocol.   Due for office visit: HTN follow up   Pharmacy note to inform pt of office visit needed for continued medication use.     Elsa Membreno, RN, BSN

## 2019-11-07 ENCOUNTER — HEALTH MAINTENANCE LETTER (OUTPATIENT)
Age: 49
End: 2019-11-07

## 2019-11-29 ENCOUNTER — TELEPHONE (OUTPATIENT)
Dept: FAMILY MEDICINE | Facility: CLINIC | Age: 49
End: 2019-11-29

## 2019-11-29 NOTE — TELEPHONE ENCOUNTER
Patient Quality Outreach      Patient has the following on his problem list:     Hypertension   Last three blood pressure readings:  BP Readings from Last 3 Encounters:   08/13/19 (!) 136/96   08/01/19 132/74   07/22/19 136/88     Blood pressure: FAILED    HTN Guidelines:  ? 139/89     Composite cancer screening  Chart review shows that this patient is due/due soon for the following None  Patient declined cancer screening. No  Summary:    Patient is due/failing the following:   Hypertension - BP Check    Type of outreach:    Phone, spoke to patient.  Appt made.    Questions for provider review:    None                                                                                                                                    Leana Valle       Chart routed to Care Team.

## 2020-01-21 ENCOUNTER — OFFICE VISIT (OUTPATIENT)
Dept: FAMILY MEDICINE | Facility: CLINIC | Age: 50
End: 2020-01-21
Payer: COMMERCIAL

## 2020-01-21 VITALS
DIASTOLIC BLOOD PRESSURE: 88 MMHG | HEIGHT: 69 IN | SYSTOLIC BLOOD PRESSURE: 138 MMHG | HEART RATE: 84 BPM | BODY MASS INDEX: 46.65 KG/M2 | WEIGHT: 315 LBS | TEMPERATURE: 98.1 F

## 2020-01-21 DIAGNOSIS — I10 BENIGN ESSENTIAL HYPERTENSION: ICD-10-CM

## 2020-01-21 DIAGNOSIS — Z23 ENCOUNTER FOR IMMUNIZATION: ICD-10-CM

## 2020-01-21 DIAGNOSIS — Z00.00 WELL ADULT EXAM: Primary | ICD-10-CM

## 2020-01-21 DIAGNOSIS — Z11.4 SCREENING FOR HUMAN IMMUNODEFICIENCY VIRUS: ICD-10-CM

## 2020-01-21 DIAGNOSIS — Z23 NEED FOR PROPHYLACTIC VACCINATION AND INOCULATION AGAINST INFLUENZA: ICD-10-CM

## 2020-01-21 DIAGNOSIS — E66.01 MORBID OBESITY (H): ICD-10-CM

## 2020-01-21 DIAGNOSIS — R03.0 PREHYPERTENSION: ICD-10-CM

## 2020-01-21 DIAGNOSIS — J01.01 ACUTE RECURRENT MAXILLARY SINUSITIS: ICD-10-CM

## 2020-01-21 PROCEDURE — 80061 LIPID PANEL: CPT | Performed by: FAMILY MEDICINE

## 2020-01-21 PROCEDURE — 36415 COLL VENOUS BLD VENIPUNCTURE: CPT | Performed by: FAMILY MEDICINE

## 2020-01-21 PROCEDURE — 80053 COMPREHEN METABOLIC PANEL: CPT | Performed by: FAMILY MEDICINE

## 2020-01-21 PROCEDURE — 90686 IIV4 VACC NO PRSV 0.5 ML IM: CPT | Performed by: FAMILY MEDICINE

## 2020-01-21 PROCEDURE — 99396 PREV VISIT EST AGE 40-64: CPT | Mod: 25 | Performed by: FAMILY MEDICINE

## 2020-01-21 PROCEDURE — 90471 IMMUNIZATION ADMIN: CPT | Performed by: FAMILY MEDICINE

## 2020-01-21 RX ORDER — METOPROLOL SUCCINATE 50 MG/1
50 TABLET, EXTENDED RELEASE ORAL DAILY
Qty: 30 TABLET | Refills: 3 | Status: SHIPPED | OUTPATIENT
Start: 2020-01-21 | End: 2020-03-10

## 2020-01-21 RX ORDER — AZITHROMYCIN 250 MG/1
TABLET, FILM COATED ORAL
Qty: 6 TABLET | Refills: 0 | Status: SHIPPED | OUTPATIENT
Start: 2020-01-21 | End: 2020-03-10

## 2020-01-21 SDOH — HEALTH STABILITY: MENTAL HEALTH
STRESS IS WHEN SOMEONE FEELS TENSE, NERVOUS, ANXIOUS, OR CAN'T SLEEP AT NIGHT BECAUSE THEIR MIND IS TROUBLED. HOW STRESSED ARE YOU?: ONLY A LITTLE

## 2020-01-21 SDOH — ECONOMIC STABILITY: FOOD INSECURITY: WITHIN THE PAST 12 MONTHS, THE FOOD YOU BOUGHT JUST DIDN'T LAST AND YOU DIDN'T HAVE MONEY TO GET MORE.: NEVER TRUE

## 2020-01-21 SDOH — SOCIAL STABILITY: SOCIAL NETWORK: ARE YOU MARRIED, WIDOWED, DIVORCED, SEPARATED, NEVER MARRIED, OR LIVING WITH A PARTNER?: MARRIED

## 2020-01-21 SDOH — HEALTH STABILITY: MENTAL HEALTH: HOW MANY STANDARD DRINKS CONTAINING ALCOHOL DO YOU HAVE ON A TYPICAL DAY?: 1 OR 2

## 2020-01-21 SDOH — ECONOMIC STABILITY: TRANSPORTATION INSECURITY
IN THE PAST 12 MONTHS, HAS LACK OF TRANSPORTATION KEPT YOU FROM MEETINGS, WORK, OR FROM GETTING THINGS NEEDED FOR DAILY LIVING?: NO

## 2020-01-21 SDOH — HEALTH STABILITY: PHYSICAL HEALTH: ON AVERAGE, HOW MANY MINUTES DO YOU ENGAGE IN EXERCISE AT THIS LEVEL?: 20 MIN

## 2020-01-21 SDOH — SOCIAL STABILITY: SOCIAL NETWORK: HOW OFTEN DO YOU ATTEND CHURCH OR RELIGIOUS SERVICES?: NEVER

## 2020-01-21 SDOH — SOCIAL STABILITY: SOCIAL NETWORK
DO YOU BELONG TO ANY CLUBS OR ORGANIZATIONS SUCH AS CHURCH GROUPS UNIONS, FRATERNAL OR ATHLETIC GROUPS, OR SCHOOL GROUPS?: NO

## 2020-01-21 SDOH — ECONOMIC STABILITY: INCOME INSECURITY: HOW HARD IS IT FOR YOU TO PAY FOR THE VERY BASICS LIKE FOOD, HOUSING, MEDICAL CARE, AND HEATING?: NOT HARD AT ALL

## 2020-01-21 SDOH — SOCIAL STABILITY: SOCIAL NETWORK: HOW OFTEN DO YOU GET TOGETHER WITH FRIENDS OR RELATIVES?: TWICE A WEEK

## 2020-01-21 SDOH — ECONOMIC STABILITY: FOOD INSECURITY: WITHIN THE PAST 12 MONTHS, YOU WORRIED THAT YOUR FOOD WOULD RUN OUT BEFORE YOU GOT MONEY TO BUY MORE.: NEVER TRUE

## 2020-01-21 SDOH — SOCIAL STABILITY: SOCIAL NETWORK
IN A TYPICAL WEEK, HOW MANY TIMES DO YOU TALK ON THE PHONE WITH FAMILY, FRIENDS, OR NEIGHBORS?: MORE THAN THREE TIMES A WEEK

## 2020-01-21 SDOH — HEALTH STABILITY: PHYSICAL HEALTH: ON AVERAGE, HOW MANY DAYS PER WEEK DO YOU ENGAGE IN MODERATE TO STRENUOUS EXERCISE (LIKE A BRISK WALK)?: 5 DAYS

## 2020-01-21 SDOH — HEALTH STABILITY: MENTAL HEALTH: HOW OFTEN DO YOU HAVE A DRINK CONTAINING ALCOHOL?: MONTHLY OR LESS

## 2020-01-21 SDOH — SOCIAL STABILITY: SOCIAL NETWORK: HOW OFTEN DO YOU ATTENT MEETINGS OF THE CLUB OR ORGANIZATION YOU BELONG TO?: NEVER

## 2020-01-21 SDOH — ECONOMIC STABILITY: TRANSPORTATION INSECURITY
IN THE PAST 12 MONTHS, HAS THE LACK OF TRANSPORTATION KEPT YOU FROM MEDICAL APPOINTMENTS OR FROM GETTING MEDICATIONS?: NO

## 2020-01-21 SDOH — HEALTH STABILITY: MENTAL HEALTH: HOW OFTEN DO YOU HAVE 6 OR MORE DRINKS ON ONE OCCASION?: LESS THAN MONTHLY

## 2020-01-21 ASSESSMENT — ENCOUNTER SYMPTOMS
EYE PAIN: 0
DIARRHEA: 0
SHORTNESS OF BREATH: 0
PALPITATIONS: 0
ABDOMINAL PAIN: 0
FEVER: 0
DIZZINESS: 0
JOINT SWELLING: 0
PARESTHESIAS: 0
SORE THROAT: 0
MYALGIAS: 1
HEMATURIA: 0
NAUSEA: 0
HEMATOCHEZIA: 0
DYSURIA: 0
CONSTIPATION: 0
NERVOUS/ANXIOUS: 0
COUGH: 1
HEADACHES: 1
WEAKNESS: 0
FREQUENCY: 0
HEARTBURN: 0
CHILLS: 0
ARTHRALGIAS: 0

## 2020-01-21 ASSESSMENT — MIFFLIN-ST. JEOR: SCORE: 2306.27

## 2020-01-21 NOTE — PROGRESS NOTES
SUBJECTIVE:   CC: Mike Schafer is an 49 year old male who presents for preventative health visit.     Imm/Inj   Associated symptoms include congestion, coughing, headaches and myalgias. Pertinent negatives include no abdominal pain, arthralgias, chest pain, chills, fever, joint swelling, nausea, rash, sore throat or weakness.   Healthy Habits:     Getting at least 3 servings of Calcium per day:  NO    Bi-annual eye exam:  Yes    Dental care twice a year:  Yes    Sleep apnea or symptoms of sleep apnea:  None    Diet:  Regular (no restrictions)    Frequency of exercise:  None    Taking medications regularly:  Yes    Medication side effects:  None    PHQ-2 Total Score: 0    Additional concerns today:  No          MORBID OBESITY Body mass index is 48.18 kg/m .      Today's PHQ-2 Score:   PHQ-2 ( 1999 Pfizer) 1/21/2020   Q1: Little interest or pleasure in doing things 0   Q2: Feeling down, depressed or hopeless 0   PHQ-2 Score 0   Q1: Little interest or pleasure in doing things Not at all   Q2: Feeling down, depressed or hopeless Not at all   PHQ-2 Score 0       Abuse: Current or Past(Physical, Sexual or Emotional)- No  Do you feel safe in your environment? Yes        Social History     Tobacco Use     Smoking status: Never Smoker     Smokeless tobacco: Never Used   Substance Use Topics     Alcohol use: Yes     Alcohol/week: 0.0 standard drinks     Frequency: Monthly or less     Drinks per session: 1 or 2     Binge frequency: Less than monthly     If you drink alcohol do you typically have >3 drinks per day or >7 drinks per week? No    Alcohol Use 1/21/2020   Prescreen: >3 drinks/day or >7 drinks/week? No   Prescreen: >3 drinks/day or >7 drinks/week? -       Last PSA: No results found for: PSA    Reviewed orders with patient. Reviewed health maintenance and updated orders accordingly - Yes  BP Readings from Last 3 Encounters:   01/21/20 138/88   08/13/19 (!) 136/96   08/01/19 132/74    Wt Readings from Last 3  Encounters:   01/21/20 145.9 kg (321 lb 9.6 oz)   08/13/19 143.3 kg (316 lb)   08/01/19 143.3 kg (316 lb)                    Reviewed and updated as needed this visit by clinical staff         Reviewed and updated as needed this visit by Provider            Review of Systems   Constitutional: Negative for chills and fever.   HENT: Positive for congestion. Negative for ear pain, hearing loss and sore throat.    Eyes: Negative for pain and visual disturbance.   Respiratory: Positive for cough. Negative for shortness of breath.    Cardiovascular: Negative for chest pain, palpitations and peripheral edema.   Gastrointestinal: Negative for abdominal pain, constipation, diarrhea, heartburn, hematochezia and nausea.   Genitourinary: Negative for discharge, dysuria, frequency, genital sores, hematuria, impotence and urgency.   Musculoskeletal: Positive for myalgias. Negative for arthralgias and joint swelling.   Skin: Negative for rash.   Neurological: Positive for headaches. Negative for dizziness, weakness and paresthesias.   Psychiatric/Behavioral: Negative for mood changes. The patient is not nervous/anxious.      CONSTITUTIONAL: NEGATIVE for fever, chills, change in weight  INTEGUMENTARY/SKIN: NEGATIVE for worrisome rashes, moles or lesions  EYES: NEGATIVE for vision changes or irritation  ENT: NEGATIVE for ear, mouth and throat problems  RESP: NEGATIVE for significant cough or SOB  CV: NEGATIVE for chest pain, palpitations or peripheral edema  GI: NEGATIVE for nausea, abdominal pain, heartburn, or change in bowel habits   male: negative for dysuria, hematuria, decreased urinary stream, erectile dysfunction, urethral discharge  MUSCULOSKELETAL: NEGATIVE for significant arthralgias or myalgia  NEURO: NEGATIVE for weakness, dizziness or paresthesias  PSYCHIATRIC: NEGATIVE for changes in mood or affect    OBJECTIVE:   There were no vitals taken for this visit.    Physical Exam  GENERAL: healthy, alert and no  distress  EYES: Eyes grossly normal to inspection, PERRL and conjunctivae and sclerae normal  HENT: ear canals and TM's normal, nose and mouth without ulcers or lesions  NECK: no adenopathy, no asymmetry, masses, or scars and thyroid normal to palpation  RESP: lungs clear to auscultation - no rales, rhonchi or wheezes  CV: regular rate and rhythm, normal S1 S2, no S3 or S4, no murmur, click or rub, no peripheral edema and peripheral pulses strong  ABDOMEN: soft, nontender, no hepatosplenomegaly, no masses and bowel sounds normal  MS: no gross musculoskeletal defects noted, no edema  SKIN: no suspicious lesions or rashes  NEURO: Normal strength and tone, mentation intact and speech normal  PSYCH: mentation appears normal, affect normal/bright    Diagnostic Test Results:  Labs reviewed in Epic    ASSESSMENT/PLAN:   1. Need for prophylactic vaccination and inoculation against influenza    - INFLUENZA VACCINE IM > 6 MONTHS VALENT IIV4 [89610]  - Vaccine Administration, Initial [45970]    2. Screening for human immunodeficiency virus  lifetime    3. Well adult exam  Work on weight loss works as a    - Lipid panel reflex to direct LDL Fasting  - Comprehensive metabolic panel    4. Morbid obesity (H)  Body mass index is 48.18 kg/m .      5. Hypertension  Current Outpatient Medications   Medication     azithromycin (ZITHROMAX Z-JAIRO) 250 MG tablet     metoprolol succinate ER (TOPROL-XL) 50 MG 24 hr tablet     order for DME     vardenafil (LEVITRA) 10 MG tablet     No current facility-administered medications for this visit.        - Lipid panel reflex to direct LDL Fasting  - Comprehensive metabolic panel    6. Encounter for immunization      7. Benign essential hypertension    - metoprolol succinate ER (TOPROL-XL) 50 MG 24 hr tablet; Take 1 tablet (50 mg) by mouth daily MUST SEE PROVIDER FOR REFILL  Dispense: 30 tablet; Refill: 3    8. Acute recurrent maxillary sinusitis    - azithromycin (ZITHROMAX Z-JAIRO) 250 MG  "tablet; Take two pills today with food then one pill daily days 2-5  Dispense: 6 tablet; Refill: 0    COUNSELING:   Reviewed preventive health counseling, as reflected in patient instructions       Regular exercise       Healthy diet/nutrition       Vision screening    Estimated body mass index is 48.05 kg/m  as calculated from the following:    Height as of 8/13/19: 1.727 m (5' 8\").    Weight as of 8/13/19: 143.3 kg (316 lb).     Weight management plan: Discussed healthy diet and exercise guidelines     reports that he has never smoked. He has never used smokeless tobacco.      Counseling Resources:  ATP IV Guidelines  Pooled Cohorts Equation Calculator  FRAX Risk Assessment  ICSI Preventive Guidelines  Dietary Guidelines for Americans, 2010  USDA's MyPlate  ASA Prophylaxis  Lung CA Screening    Keron Corbett MD  [facility]"

## 2020-01-21 NOTE — PROGRESS NOTES
"Pre-Visit Planning     Future Appointments   Date Time Provider Department Center   1/21/2020 10:10 AM Keron Corbett MD [facility]     Arrival Time for this Appointment:  9:45 AM   Appointment Notes for this encounter:   Data Unavailable, left elbow joint sore... was moving couch and heard a loud \"pop,\" doesn't feel broken or strained, but wants checked    Questionnaires Reviewed/Assigned  No additional questionnaires are needed        Patient preferred phone number: [phone]    Spoke to patient via phone. Patient does not have additional questions or concerns.        Visit is preventive. Reviewed purpose of preventive visit with patient.    Health Maintenance Due   Topic Date Due     ANNUAL REVIEW OF HM ORDERS  1970     HIV SCREENING  08/22/1985     PREVENTIVE CARE VISIT  07/12/2017     INFLUENZA VACCINE (1) 09/01/2019     PHQ-2  01/01/2020     Patient is due for:  preventive care visit, HIV screen  No appointment needed.    [employer]  Patient is not active on [employer]. Signed patient up for [employer].    Call Summary  \"Thank you for your time today.  If anything comes up before your appointment, please feel free to contact us at [phone].\"  Kasie Butcher, KARLAN, RN, PHN    "

## 2020-01-22 LAB
ALBUMIN SERPL-MCNC: 3.7 G/DL (ref 3.4–5)
ALP SERPL-CCNC: 92 U/L (ref 40–150)
ALT SERPL W P-5'-P-CCNC: 101 U/L (ref 0–70)
ANION GAP SERPL CALCULATED.3IONS-SCNC: 8 MMOL/L (ref 3–14)
AST SERPL W P-5'-P-CCNC: 66 U/L (ref 0–45)
BILIRUB SERPL-MCNC: 1 MG/DL (ref 0.2–1.3)
BUN SERPL-MCNC: 13 MG/DL (ref 7–30)
CALCIUM SERPL-MCNC: 8.9 MG/DL (ref 8.5–10.1)
CHLORIDE SERPL-SCNC: 108 MMOL/L (ref 94–109)
CHOLEST SERPL-MCNC: 160 MG/DL
CO2 SERPL-SCNC: 23 MMOL/L (ref 20–32)
CREAT SERPL-MCNC: 0.62 MG/DL (ref 0.66–1.25)
GFR SERPL CREATININE-BSD FRML MDRD: >90 ML/MIN/{1.73_M2}
GLUCOSE SERPL-MCNC: 109 MG/DL (ref 70–99)
HDLC SERPL-MCNC: 39 MG/DL
LDLC SERPL CALC-MCNC: 101 MG/DL
NONHDLC SERPL-MCNC: 121 MG/DL
POTASSIUM SERPL-SCNC: 3.8 MMOL/L (ref 3.4–5.3)
PROT SERPL-MCNC: 7.9 G/DL (ref 6.8–8.8)
SODIUM SERPL-SCNC: 139 MMOL/L (ref 133–144)
TRIGL SERPL-MCNC: 102 MG/DL

## 2020-03-10 ENCOUNTER — OFFICE VISIT (OUTPATIENT)
Dept: FAMILY MEDICINE | Facility: CLINIC | Age: 50
End: 2020-03-10
Payer: OTHER MISCELLANEOUS

## 2020-03-10 VITALS
HEART RATE: 66 BPM | DIASTOLIC BLOOD PRESSURE: 86 MMHG | BODY MASS INDEX: 47.74 KG/M2 | SYSTOLIC BLOOD PRESSURE: 138 MMHG | HEIGHT: 68 IN | RESPIRATION RATE: 16 BRPM | WEIGHT: 315 LBS | TEMPERATURE: 98.1 F

## 2020-03-10 DIAGNOSIS — Z01.818 PREOP GENERAL PHYSICAL EXAM: Primary | ICD-10-CM

## 2020-03-10 DIAGNOSIS — I10 BENIGN ESSENTIAL HYPERTENSION: ICD-10-CM

## 2020-03-10 DIAGNOSIS — E66.01 MORBID OBESITY (H): ICD-10-CM

## 2020-03-10 LAB — HGB BLD-MCNC: 15.2 G/DL (ref 13.3–17.7)

## 2020-03-10 PROCEDURE — 85018 HEMOGLOBIN: CPT | Performed by: FAMILY MEDICINE

## 2020-03-10 PROCEDURE — 36415 COLL VENOUS BLD VENIPUNCTURE: CPT | Performed by: FAMILY MEDICINE

## 2020-03-10 PROCEDURE — 80048 BASIC METABOLIC PNL TOTAL CA: CPT | Performed by: FAMILY MEDICINE

## 2020-03-10 PROCEDURE — 99214 OFFICE O/P EST MOD 30 MIN: CPT | Performed by: FAMILY MEDICINE

## 2020-03-10 PROCEDURE — 93000 ELECTROCARDIOGRAM COMPLETE: CPT | Performed by: FAMILY MEDICINE

## 2020-03-10 RX ORDER — METOPROLOL SUCCINATE 50 MG/1
50 TABLET, EXTENDED RELEASE ORAL DAILY
Qty: 30 TABLET | Refills: 6 | Status: SHIPPED | OUTPATIENT
Start: 2020-03-10 | End: 2020-06-30

## 2020-03-10 ASSESSMENT — MIFFLIN-ST. JEOR: SCORE: 2319.36

## 2020-03-10 NOTE — LETTER
March 11, 2020      Mike Schafer  [address]  [facility] [address]        Dear ,    We are writing to inform you of your test results.    Your results are also faxed to surgery center.    Resulted Orders   Hemoglobin   Result Value Ref Range    Hemoglobin 15.2 13.3 - 17.7 g/dL   **Basic metabolic panel FUTURE anytime   Result Value Ref Range    Sodium 137 133 - 144 mmol/L    Potassium 4.2 3.4 - 5.3 mmol/L    Chloride 107 94 - 109 mmol/L    Carbon Dioxide 21 20 - 32 mmol/L    Anion Gap 9 3 - 14 mmol/L    Glucose 92 70 - 99 mg/dL      Comment:      Non Fasting    Urea Nitrogen 16 7 - 30 mg/dL    Creatinine 0.70 0.66 - 1.25 mg/dL    GFR Estimate >90 >60 mL/min/[1.73_m2]      Comment:      Non  GFR Calc  Starting 12/18/2018, serum creatinine based estimated GFR (eGFR) will be   calculated using the Chronic Kidney Disease Epidemiology Collaboration   (CKD-EPI) equation.      GFR Estimate If Black >90 >60 mL/min/[1.73_m2]      Comment:       GFR Calc  Starting 12/18/2018, serum creatinine based estimated GFR (eGFR) will be   calculated using the Chronic Kidney Disease Epidemiology Collaboration   (CKD-EPI) equation.      Calcium 8.7 8.5 - 10.1 mg/dL       If you have any questions or concerns, please call the clinic at the number listed above.       Sincerely,        Keron Corbett MD/kaylin

## 2020-03-10 NOTE — PROGRESS NOTES
[facility]  [address] [place] [id number] [phone]  Dept: [phone]    PRE-OP EVALUATION:  Today's date: 3/10/2020    Mike Schafer (: 1970) presents for pre-operative evaluation assessment as requested by Dr. Jose Barahona.  He requires evaluation and anesthesia risk assessment prior to undergoing surgery/procedure for treatment of Case Request OR - Orthopedic Surgery: Left distal biceps reinsertion vs. reconstruction  .    Fax number for surgical facility: [phone]  Primary Physician: Keron Corbett  Type of Anesthesia Anticipated: General    Patient has a Health Care Directive or Living Will:  NO    Preop Questions 3/10/2020   Who is doing your surgery? dont know   What are you having done? bicep reatachment   Date of Surgery/Procedure: 3/17/2020   Facility or Hospital where procedure/surgery will be performed: [facility]   1.  Do you have a history of Heart attack, stroke, stent, coronary bypass surgery, or other heart surgery? No   2.  Do you ever have any pain or discomfort in your chest? No   3.  Do you have a history of  Heart Failure? No   4.   Are you troubled by shortness of breath when:  walking on a level surface, or up a slight hill, or at night? No   5.  Do you currently have a cold, bronchitis or other respiratory infection? No   6.  Do you have a cough, shortness of breath, or wheezing? No   7.  Do you sometimes get pains in the calves of your legs when you walk? No   8. Do you or anyone in your family have previous history of blood clots? No   9.  Do you or does anyone in your family have a serious bleeding problem such as prolonged bleeding following surgeries or cuts? No   10. Have you ever had problems with anemia or been told to take iron pills? No   11. Have you had any abnormal blood loss such as black, tarry or bloody stools? No   12. Have you ever had a blood transfusion? No   13. Have you or any of your  relatives ever had problems with anesthesia? No   14. Do you have sleep apnea, excessive snoring or daytime drowsiness? No   15. Do you have any prosthetic heart valves? No   16. Do you have prosthetic joints? No         HPI:     HPI related to upcoming procedure: left arm biceps   Tore his muscle 2/5/20 on the job with pain and weakness         MEDICAL HISTORY:     Patient Active Problem List    Diagnosis Date Noted     Benign essential hypertension 07/12/2016     Priority: Medium     bmi over 40 07/12/2016     Priority: Medium      Past Medical History:   Diagnosis Date     Hypertension      No past surgical history on file.  Current Outpatient Medications   Medication Sig Dispense Refill     azithromycin (ZITHROMAX Z-JAIRO) 250 MG tablet Take two pills today with food then one pill daily days 2-5 6 tablet 0     metoprolol succinate ER (TOPROL-XL) 50 MG 24 hr tablet Take 1 tablet (50 mg) by mouth daily MUST SEE PROVIDER FOR REFILL 30 tablet 3     order for DME Equipment being ordered: short cam walker 1 Device 0     vardenafil (LEVITRA) 10 MG tablet Take 1 tablet (10 mg) by mouth daily as needed 60 min prior to sex. Do not use with nitroglycerin, terazosin or doxazosin. 6 tablet 1     OTC products: None, except as noted above    No Known Allergies   Latex Allergy: NO    Social History     Tobacco Use     Smoking status: Never Smoker     Smokeless tobacco: Never Used   Substance Use Topics     Alcohol use: Yes     Alcohol/week: 0.0 standard drinks     Frequency: Monthly or less     Drinks per session: 1 or 2     Binge frequency: Less than monthly     History   Drug Use No       REVIEW OF SYSTEMS:   Constitutional, neuro, ENT, endocrine, pulmonary, cardiac, gastrointestinal, genitourinary, musculoskeletal, integument and psychiatric systems are negative, except as otherwise noted.  Past Medical History:   Diagnosis Date     Hypertension        No past surgical history on file.    Family History   Problem Relation Age  "of Onset     Breast Cancer Mother      Diabetes Father      Other Cancer Maternal Grandmother      Other Cancer Paternal Grandfather        Social History     Tobacco Use     Smoking status: Never Smoker     Smokeless tobacco: Never Used   Substance Use Topics     Alcohol use: Yes     Alcohol/week: 0.0 standard drinks     Frequency: Monthly or less     Drinks per session: 1 or 2     Binge frequency: Less than monthly         EXAM:   /86 (BP Location: Right arm, Patient Position: Chair, Cuff Size: Adult Large)   Pulse 66   Temp 98.1  F (36.7  C) (Oral)   Resp 16   Ht 1.715 m (5' 7.5\")   Wt 148.8 kg (328 lb)   BMI 50.61 kg/m      GENERAL APPEARANCE: healthy, alert and no distress, overweight     EYES: EOMI,  PERRL     HENT: ear canals and TM's normal and nose and mouth without ulcers or lesions     NECK: no adenopathy, no asymmetry, masses, or scars and thyroid normal to palpation     RESP: lungs clear to auscultation - no rales, rhonchi or wheezes     CV: regular rates and rhythm, normal S1 S2, no S3 or S4 and no murmur, click or rub     ABDOMEN:  soft, nontender, no HSM or masses and bowel sounds normal     MS: extremities normal- no gross deformities noted, no evidence of inflammation in joints, FROM in all extremities.     SKIN: no suspicious lesions or rashes     NEURO: Normal strength and tone, sensory exam grossly normal, mentation intact and speech normal     PSYCH: mentation appears normal. and affect normal/bright     LYMPHATICS: No cervical adenopathy    DIAGNOSTICS:   EKG: appears normal, NSR, normal axis, normal intervals, no acute ST/T changes c/w ischemia, no LVH by voltage criteria, unchanged from previous tracings    Recent Labs   Lab Test 01/21/20  1024 04/12/19  1506 03/09/18  1414  03/31/17  1708   HGB  --   --  15.1  --  14.5   PLT  --   --  275  --  293    139  --    < >  --    POTASSIUM 3.8 3.9  --    < >  --    CR 0.62* 0.74  --    < >  --     < > = values in this interval not " displayed.        IMPRESSION:   Reason for surgery/procedure: left biceps  tear  Diagnosis/reason for consult: preop     The proposed surgical procedure is considered LOW risk.    REVISED CARDIAC RISK INDEX  The patient has the following serious cardiovascular risks for perioperative complications such as (MI, PE, VFib and 3  AV Block):  No serious cardiac risks  INTERPRETATION: 1 risks: Class II (low risk - 0.9% complication rate)    The patient has the following additional risks for perioperative complications:  Morbid obesity        RECOMMENDATIONS:     Fit for surgery without  Further testing    Hold meds am of surgery         APPROVAL GIVEN to proceed with proposed procedure, without further diagnostic evaluation       Signed Electronically by: Keron Corbett MD    Copy of this evaluation report is provided to requesting physician.    [facility] Preop Guidelines    Revised Cardiac Risk Index

## 2020-03-11 LAB
ANION GAP SERPL CALCULATED.3IONS-SCNC: 9 MMOL/L (ref 3–14)
BUN SERPL-MCNC: 16 MG/DL (ref 7–30)
CALCIUM SERPL-MCNC: 8.7 MG/DL (ref 8.5–10.1)
CHLORIDE SERPL-SCNC: 107 MMOL/L (ref 94–109)
CO2 SERPL-SCNC: 21 MMOL/L (ref 20–32)
CREAT SERPL-MCNC: 0.7 MG/DL (ref 0.66–1.25)
GFR SERPL CREATININE-BSD FRML MDRD: >90 ML/MIN/{1.73_M2}
GLUCOSE SERPL-MCNC: 92 MG/DL (ref 70–99)
POTASSIUM SERPL-SCNC: 4.2 MMOL/L (ref 3.4–5.3)
SODIUM SERPL-SCNC: 137 MMOL/L (ref 133–144)

## 2020-06-30 NOTE — PROGRESS NOTES
"Pre-Visit Planning     Future Appointments   Date Time Provider Department Center   7/1/2020  3:30 PM Keron Corbett MD [facility]     Arrival Time for this Appointment:  3:30 PM   Appointment Notes for this encounter:   JACIEL/             KAJAL DOXIMITY OR TELEPHONE VISIT ONLY NO AMWELL AT HOME     Med check, hypertension    Questionnaires Reviewed/Assigned  No additional questionnaires are needed        Patient preferred phone number: [phone]      Spoke to patient via phone. Are there any additional questions or concerns you'd like to review with your provider during your visit? No    Patient does not have additional questions or concerns.        Visit is not preventive.    Meds  Is there anything on your medication list that needs to be updated? No    Current Outpatient Medications   Medication     metoprolol succinate ER (TOPROL-XL) 50 MG 24 hr tablet     order for DME     vardenafil (LEVITRA) 10 MG tablet     No current facility-administered medications for this visit.      Which pharmacy do you prefer to use for medications during this visit if needed? [facility] in [place]    Do you need refills on any of your medications? Yes: metoprolol; t'd up    Health Maintenance Due   Topic Date Due     ANNUAL REVIEW OF HM ORDERS  1970     HIV SCREENING  08/22/1985     DTAP/TDAP/TD IMMUNIZATION (3 - Td) 03/19/2020     Patient is due for:  HIV and tetanus   No appointment needed.    [employer]  Patient is active on "[employer].    Questionnaire Review   video visit    Call Summary  \"Thank you for your time today.  If anything comes up before your appointment, please feel free to contact us at [phone].\"    "

## 2020-07-01 ENCOUNTER — VIRTUAL VISIT (OUTPATIENT)
Dept: FAMILY MEDICINE | Facility: CLINIC | Age: 50
End: 2020-07-01
Payer: COMMERCIAL

## 2020-07-01 DIAGNOSIS — E66.01 MORBID OBESITY (H): Primary | ICD-10-CM

## 2020-07-01 DIAGNOSIS — Z23 ENCOUNTER FOR IMMUNIZATION: ICD-10-CM

## 2020-07-01 DIAGNOSIS — E78.5 HYPERLIPIDEMIA LDL GOAL <100: ICD-10-CM

## 2020-07-01 DIAGNOSIS — Z11.4 SCREENING FOR HUMAN IMMUNODEFICIENCY VIRUS: ICD-10-CM

## 2020-07-01 DIAGNOSIS — I10 BENIGN ESSENTIAL HYPERTENSION: ICD-10-CM

## 2020-07-01 PROCEDURE — 99213 OFFICE O/P EST LOW 20 MIN: CPT | Mod: 95 | Performed by: FAMILY MEDICINE

## 2020-07-01 RX ORDER — METOPROLOL SUCCINATE 50 MG/1
50 TABLET, EXTENDED RELEASE ORAL DAILY
Qty: 30 TABLET | Refills: 11 | Status: SHIPPED | OUTPATIENT
Start: 2020-07-01 | End: 2021-07-16

## 2020-07-01 NOTE — PROGRESS NOTES
"Mike Schafer is a 49 year old male who is being evaluated via a billable video visit.      The patient has been notified of following:     \"This video visit will be conducted via a call between you and your physician/provider. We have found that certain health care needs can be provided without the need for an in-person physical exam.  This service lets us provide the care you need with a video conversation.  If a prescription is necessary we can send it directly to your pharmacy.  If lab work is needed we can place an order for that and you can then stop by our lab to have the test done at a later time.    Video visits are billed at different rates depending on your insurance coverage.  Please reach out to your insurance provider with any questions.    If during the course of the call the physician/provider feels a video visit is not appropriate, you will not be charged for this service.\"    Patient has given verbal consent for Video visit? Yes  How would you like to obtain your AVS? [facility]  Patient would like the video invitation sent by: Text to cell phone: [phone]  Will anyone else be joining your video visit? No    Subjective     Mike Schafer is a 49 year old male who presents today via video visit for the following health issues:    History of Present Illness        Hypertension: He presents for follow up of hypertension.  He does not check blood pressure  regularly outside of the clinic. Outpatient blood pressures have not been over 140/90. He does not follow a low salt diet. He consumes 3 sweetened beverage(s) daily.He exercises with enough effort to increase his heart rate 9 or less minutes per day.  He exercises with enough effort to increase his heart rate 3 or less days per week.   He is taking medications regularly.           Video Start Time: 3:30    Completion 3: 20    Patient Active Problem List   Diagnosis     Benign essential hypertension     bmi over 40     History reviewed. No " pertinent surgical history.    Social History     Tobacco Use     Smoking status: Never Smoker     Smokeless tobacco: Never Used   Substance Use Topics     Alcohol use: Yes     Alcohol/week: 0.0 standard drinks     Frequency: Monthly or less     Drinks per session: 1 or 2     Binge frequency: Less than monthly     Family History   Problem Relation Age of Onset     Breast Cancer Mother      Diabetes Father      Other Cancer Maternal Grandmother      Other Cancer Paternal Grandfather            Reviewed and updated as needed this visit by Provider         Review of Systems   Constitutional, HEENT, cardiovascular, pulmonary, gi and gu systems are negative, except as otherwise noted.      Objective    On exam the vital signs are stable  Weight is There is no height or weight on file to calculate BMI.   Eyes show jessie   No neck masses or thyromegaly  No bruits, murmers, rubs or extrasounds. No cardiomegaly or chest wall tenderness. Lungs clear, no abdominal masses or organomegaly. No CVA tenderness.  Skin eval no icterus  There were no vitals taken for this visit.  *                Physical Exam     GENERAL: Healthy, alert and no distress  EYES: Eyes grossly normal to inspection.  No discharge or erythema, or obvious scleral/conjunctival abnormalities.  RESP: No audible wheeze, cough, or visible cyanosis.  No visible retractions or increased work of breathing.    SKIN: Visible skin clear. No significant rash, abnormal pigmentation or lesions.  NEURO: Cranial nerves grossly intact.  Mentation and speech appropriate for age.  PSYCH: Mentation appears normal, affect normal/bright, judgement and insight intact, normal speech and appearance well-groomed.      Hypertension with home reading         Assessment & Plan     1. Benign essential hypertension    - metoprolol succinate ER (TOPROL-XL) 50 MG 24 hr tablet; Take 1 tablet (50 mg) by mouth daily  Dispense: 30 tablet; Refill: 11  - **Comprehensive metabolic panel FUTURE  anytime; Future    2. Encounter for immunization plan    - TD (ADULT, 7+) PRESERVE FREE; Future    3. Screening for human immunodeficiency virus    - HIV Screening; Future    4. Morbid obesity (H)      5. Hyperlipidemia LDL goal <100    - **Comprehensive metabolic panel FUTURE anytime; Future  - Lipid panel reflex to direct LDL Fasting; Future       Work on weight loss    Return in about 4 months (around 11/1/2020) for Lab Work, Routine Visit.    Keron Corbett MD  [facility]      Video-Visit Details    Type of service:  Video Visit      Originating Location (pt. Location): Home    Distant Location (provider location):  [facility] /[place]    Platform used for Video Visit: [employer]          Keron Corbett MD

## 2020-12-06 ENCOUNTER — HEALTH MAINTENANCE LETTER (OUTPATIENT)
Age: 50
End: 2020-12-06

## 2021-04-11 ENCOUNTER — HEALTH MAINTENANCE LETTER (OUTPATIENT)
Age: 51
End: 2021-04-11

## 2021-04-16 ENCOUNTER — OFFICE VISIT (OUTPATIENT)
Dept: FAMILY MEDICINE | Facility: CLINIC | Age: 51
End: 2021-04-16
Payer: COMMERCIAL

## 2021-04-16 ENCOUNTER — ANCILLARY PROCEDURE (OUTPATIENT)
Dept: GENERAL RADIOLOGY | Facility: CLINIC | Age: 51
End: 2021-04-16
Attending: FAMILY MEDICINE
Payer: COMMERCIAL

## 2021-04-16 VITALS
WEIGHT: 315 LBS | RESPIRATION RATE: 21 BRPM | OXYGEN SATURATION: 97 % | HEIGHT: 68 IN | HEART RATE: 77 BPM | TEMPERATURE: 98.3 F | SYSTOLIC BLOOD PRESSURE: 136 MMHG | DIASTOLIC BLOOD PRESSURE: 88 MMHG | BODY MASS INDEX: 47.74 KG/M2

## 2021-04-16 DIAGNOSIS — R06.83 SNORING: ICD-10-CM

## 2021-04-16 DIAGNOSIS — R06.09 DOE (DYSPNEA ON EXERTION): Primary | ICD-10-CM

## 2021-04-16 DIAGNOSIS — R05.9 COUGH: ICD-10-CM

## 2021-04-16 DIAGNOSIS — J18.9 ATYPICAL PNEUMONIA: ICD-10-CM

## 2021-04-16 LAB
ALBUMIN SERPL-MCNC: 3.3 G/DL (ref 3.4–5)
ALP SERPL-CCNC: 104 U/L (ref 40–150)
ALT SERPL W P-5'-P-CCNC: 265 U/L (ref 0–70)
ANION GAP SERPL CALCULATED.3IONS-SCNC: 3 MMOL/L (ref 3–14)
AST SERPL W P-5'-P-CCNC: 135 U/L (ref 0–45)
BASOPHILS # BLD AUTO: 0 10E9/L (ref 0–0.2)
BASOPHILS NFR BLD AUTO: 0.4 %
BILIRUB SERPL-MCNC: 0.9 MG/DL (ref 0.2–1.3)
BUN SERPL-MCNC: 14 MG/DL (ref 7–30)
CALCIUM SERPL-MCNC: 8.8 MG/DL (ref 8.5–10.1)
CHLORIDE SERPL-SCNC: 104 MMOL/L (ref 94–109)
CO2 SERPL-SCNC: 30 MMOL/L (ref 20–32)
CREAT SERPL-MCNC: 0.74 MG/DL (ref 0.66–1.25)
D DIMER PPP FEU-MCNC: 0.4 UG/ML FEU (ref 0–0.5)
DIFFERENTIAL METHOD BLD: NORMAL
EOSINOPHIL # BLD AUTO: 0.1 10E9/L (ref 0–0.7)
EOSINOPHIL NFR BLD AUTO: 2.6 %
ERYTHROCYTE [DISTWIDTH] IN BLOOD BY AUTOMATED COUNT: 13.4 % (ref 10–15)
GFR SERPL CREATININE-BSD FRML MDRD: >90 ML/MIN/{1.73_M2}
GLUCOSE SERPL-MCNC: 101 MG/DL (ref 70–99)
HBA1C MFR BLD: 5.6 % (ref 0–5.6)
HCT VFR BLD AUTO: 44.1 % (ref 40–53)
HGB BLD-MCNC: 15 G/DL (ref 13.3–17.7)
LYMPHOCYTES # BLD AUTO: 1.4 10E9/L (ref 0.8–5.3)
LYMPHOCYTES NFR BLD AUTO: 28.1 %
MCH RBC QN AUTO: 29.9 PG (ref 26.5–33)
MCHC RBC AUTO-ENTMCNC: 34 G/DL (ref 31.5–36.5)
MCV RBC AUTO: 88 FL (ref 78–100)
MONOCYTES # BLD AUTO: 0.5 10E9/L (ref 0–1.3)
MONOCYTES NFR BLD AUTO: 9 %
NEUTROPHILS # BLD AUTO: 3.1 10E9/L (ref 1.6–8.3)
NEUTROPHILS NFR BLD AUTO: 59.9 %
NT-PROBNP SERPL-MCNC: 23 PG/ML (ref 0–125)
PLATELET # BLD AUTO: 206 10E9/L (ref 150–450)
POTASSIUM SERPL-SCNC: 3.8 MMOL/L (ref 3.4–5.3)
PROT SERPL-MCNC: 7.6 G/DL (ref 6.8–8.8)
RBC # BLD AUTO: 5.02 10E12/L (ref 4.4–5.9)
SODIUM SERPL-SCNC: 137 MMOL/L (ref 133–144)
TSH SERPL DL<=0.005 MIU/L-ACNC: 3.78 MU/L (ref 0.4–4)
WBC # BLD AUTO: 5.1 10E9/L (ref 4–11)

## 2021-04-16 PROCEDURE — 83880 ASSAY OF NATRIURETIC PEPTIDE: CPT | Performed by: FAMILY MEDICINE

## 2021-04-16 PROCEDURE — 93000 ELECTROCARDIOGRAM COMPLETE: CPT | Performed by: FAMILY MEDICINE

## 2021-04-16 PROCEDURE — 83036 HEMOGLOBIN GLYCOSYLATED A1C: CPT | Performed by: FAMILY MEDICINE

## 2021-04-16 PROCEDURE — 80050 GENERAL HEALTH PANEL: CPT | Performed by: FAMILY MEDICINE

## 2021-04-16 PROCEDURE — 36415 COLL VENOUS BLD VENIPUNCTURE: CPT | Performed by: FAMILY MEDICINE

## 2021-04-16 PROCEDURE — 99214 OFFICE O/P EST MOD 30 MIN: CPT | Performed by: FAMILY MEDICINE

## 2021-04-16 PROCEDURE — 71046 X-RAY EXAM CHEST 2 VIEWS: CPT | Performed by: RADIOLOGY

## 2021-04-16 PROCEDURE — 85379 FIBRIN DEGRADATION QUANT: CPT | Performed by: FAMILY MEDICINE

## 2021-04-16 RX ORDER — ALBUTEROL SULFATE 90 UG/1
2 AEROSOL, METERED RESPIRATORY (INHALATION) EVERY 6 HOURS
Qty: 18 G | Refills: 0 | Status: SHIPPED | OUTPATIENT
Start: 2021-04-16 | End: 2021-05-14

## 2021-04-16 RX ORDER — AZITHROMYCIN 250 MG/1
TABLET, FILM COATED ORAL
Qty: 6 TABLET | Refills: 0 | Status: SHIPPED | OUTPATIENT
Start: 2021-04-16 | End: 2021-04-21

## 2021-04-16 ASSESSMENT — MIFFLIN-ST. JEOR: SCORE: 2350.65

## 2021-04-16 NOTE — PROGRESS NOTES
"  Assessment & Plan     BURR (dyspnea on exertion)  Unclear etiology.  Possible infiltrate noted on x-ray.  Recommend treatment with antibiotics.  Will have follow-up x-ray in 1 month.  Recommend labs below.  Consider echocardiogram if cough continues as well as recommend sleep study.  - CBC with platelets and differential  - Comprehensive metabolic panel (BMP + Alb, Alk Phos, ALT, AST, Total. Bili, TP)  - BNP-N terminal pro  - TSH with free T4 reflex  - Hemoglobin A1c  - D dimer, quantitative  - XR Chest 2 Views  - EKG 12-lead complete w/read - Clinics  - albuterol (PROAIR HFA/PROVENTIL HFA/VENTOLIN HFA) 108 (90 Base) MCG/ACT inhaler; Inhale 2 puffs into the lungs every 6 hours    Atypical pneumonia  Trial of antibiotics and inhaler as below.  Follow-up in 1 month for recheck.  CT chest recommended if not resolved radiographically at that time.  - azithromycin (ZITHROMAX) 250 MG tablet; Take 2 tablets (500 mg) by mouth daily for 1 day, THEN 1 tablet (250 mg) daily for 4 days.  - albuterol (PROAIR HFA/PROVENTIL HFA/VENTOLIN HFA) 108 (90 Base) MCG/ACT inhaler; Inhale 2 puffs into the lungs every 6 hours    Cough    Snoring  - SLEEP EVALUATION & MANAGEMENT REFERRAL - [facility]  [phone] (Age 18 and up); Future             BMI:   Estimated body mass index is 51.85 kg/m  as calculated from the following:    Height as of this encounter: 1.715 m (5' 7.5\").    Weight as of this encounter: 152.4 kg (336 lb).           Return in about 1 month (around 5/16/2021) for recheck lungs/CXR.    Ok Shepherd MD  [facility]AJ Wetzel is a 50 year old who presents for the following health issues     HPI     Patient here for evaluation of shortness of breath over the past year.  Has shortness of breath with activity but also some difficulty catching his breath and just at rest.  Denies chest pain.  Has nonproductive cough.  He did have Covid a few months ago " "though he was having the symptoms prior to that and does not feel that they are significantly different after.  Denies fever.  No history of asthma but has had inhaler with respiratory illness in the past which has helped.  Denies nasal drainage.  No sinus pain.  Patient with some sleep difficulty but only occasional snoring.    History of hypertension currently well controlled with metoprolol.    Review of Systems         Objective    /88 (BP Location: Right arm, Patient Position: Chair, Cuff Size: Adult Regular)   Pulse 77   Temp 98.3  F (36.8  C)   Resp 21   Ht 1.715 m (5' 7.5\")   Wt (!) 152.4 kg (336 lb)   SpO2 97%   BMI 51.85 kg/m    Body mass index is 51.85 kg/m .  Physical Exam   GENERAL: alert, no distress and obese  HENT: ear canals and TM's normal, nose and mouth without ulcers or lesions  NECK: no adenopathy, no asymmetry, masses, or scars and thyroid normal to palpation  RESP: lungs clear to auscultation - no rales, rhonchi or wheezes  CV: regular rate and rhythm, normal S1 S2, no S3 or S4, no murmur, click or rub, no peripheral edema and peripheral pulses strong    CXR - Reviewed and interpreted by me: Small left lateral infiltrate, normal cardiac silhouette, otherwise normal  EKG - Reviewed and interpreted by me Normal Sinus Rhythm, normal axis, normal intervals, no acute ST/T changes c/w ischemia, possible voltage criteria for LVH with R(I) and S(III) noted              "

## 2021-04-17 ASSESSMENT — ASTHMA QUESTIONNAIRES: ACT_TOTALSCORE: 7

## 2021-05-04 DIAGNOSIS — J18.9 ATYPICAL PNEUMONIA: ICD-10-CM

## 2021-05-04 DIAGNOSIS — R06.09 DOE (DYSPNEA ON EXERTION): ICD-10-CM

## 2021-05-05 NOTE — TELEPHONE ENCOUNTER
Sent patient message regarding note below, please see if patient is already out     Esme Mcrae/

## 2021-05-14 RX ORDER — ALBUTEROL SULFATE 90 UG/1
2 AEROSOL, METERED RESPIRATORY (INHALATION) EVERY 6 HOURS
Qty: 8.5 G | Refills: 0 | Status: SHIPPED | OUTPATIENT
Start: 2021-05-14 | End: 2023-08-08

## 2021-05-14 NOTE — TELEPHONE ENCOUNTER
"Called patient regarding below, says pharmacist encouraged an immediate 2nd refill to keep on hand. Patient states he currently has about 3/4 of his current inhaler and that he does not require it frequently. He is only going by the pharmacist recommendation and states he will accept \"no\" for an answer from his provider without complaint. Please advise on best course of action, thank you.     ~Yris VALDES,     "

## 2021-05-20 ENCOUNTER — ANCILLARY PROCEDURE (OUTPATIENT)
Dept: GENERAL RADIOLOGY | Facility: CLINIC | Age: 51
End: 2021-05-20
Attending: FAMILY MEDICINE
Payer: COMMERCIAL

## 2021-05-20 ENCOUNTER — OFFICE VISIT (OUTPATIENT)
Dept: FAMILY MEDICINE | Facility: CLINIC | Age: 51
End: 2021-05-20
Payer: COMMERCIAL

## 2021-05-20 VITALS
DIASTOLIC BLOOD PRESSURE: 80 MMHG | WEIGHT: 315 LBS | HEART RATE: 74 BPM | RESPIRATION RATE: 20 BRPM | BODY MASS INDEX: 47.74 KG/M2 | TEMPERATURE: 97.9 F | SYSTOLIC BLOOD PRESSURE: 126 MMHG | HEIGHT: 68 IN

## 2021-05-20 DIAGNOSIS — B36.0 TINEA VERSICOLOR: ICD-10-CM

## 2021-05-20 DIAGNOSIS — R06.09 DOE (DYSPNEA ON EXERTION): Primary | ICD-10-CM

## 2021-05-20 DIAGNOSIS — E66.01 MORBID OBESITY (H): ICD-10-CM

## 2021-05-20 PROCEDURE — 99214 OFFICE O/P EST MOD 30 MIN: CPT | Performed by: FAMILY MEDICINE

## 2021-05-20 PROCEDURE — 71046 X-RAY EXAM CHEST 2 VIEWS: CPT | Performed by: RADIOLOGY

## 2021-05-20 RX ORDER — KETOCONAZOLE 20 MG/ML
SHAMPOO TOPICAL DAILY PRN
Qty: 120 ML | Refills: 4 | Status: SHIPPED | OUTPATIENT
Start: 2021-05-20 | End: 2023-12-13

## 2021-05-20 RX ORDER — FLUTICASONE PROPIONATE AND SALMETEROL 113; 14 UG/1; UG/1
1 POWDER, METERED RESPIRATORY (INHALATION) 2 TIMES DAILY
Qty: 1 EACH | Refills: 1 | Status: SHIPPED | OUTPATIENT
Start: 2021-05-20 | End: 2023-12-13

## 2021-05-20 RX ORDER — ITRACONAZOLE 100 MG/1
200 CAPSULE ORAL DAILY
Qty: 10 CAPSULE | Refills: 0 | Status: SHIPPED | OUTPATIENT
Start: 2021-05-20 | End: 2021-05-26

## 2021-05-20 ASSESSMENT — MIFFLIN-ST. JEOR: SCORE: 2300.75

## 2021-05-20 NOTE — LETTER
My Asthma Action Plan    Name: Mike Schafer   YOB: 1970  Date: 5/20/2021   My doctor: Ok Shepherd MD   My clinic: [facility]        My Control Medicine: Fluticasone propionate + salmeterol (AirDuo RespiClick) -  113/14 mcg 2 times daily  My Rescue Medicine: Albuterol (Proair/Ventolin/Proventil HFA) 2-4 puffs EVERY 4 HOURS as needed. Use a spacer if recommended by your provider.   My Asthma Severity:   Mild Persistent  Know your asthma triggers: upper respiratory infections, humidity, exercise or sports and cold air               GREEN ZONE   Good Control    I feel good    No cough or wheeze    Can work, sleep and play without asthma symptoms       Take your asthma control medicine every day.     1. If exercise triggers your asthma, take your rescue medication    15 minutes before exercise or sports, and    During exercise if you have asthma symptoms  2. Spacer to use with inhaler: If you have a spacer, make sure to use it with your inhaler             YELLOW ZONE Getting Worse  I have ANY of these:    I do not feel good    Cough or wheeze    Chest feels tight    Wake up at night   1. Keep taking your Green Zone medications  2. Start taking your rescue medicine:    every 20 minutes for up to 1 hour. Then every 4 hours for 24-48 hours.  3. If you stay in the Yellow Zone for more than 12-24 hours, contact your doctor.  4. If you do not return to the Green Zone in 12-24 hours or you get worse, start taking your oral steroid medicine if prescribed by your provider.           RED ZONE Medical Alert - Get Help  I have ANY of these:    I feel awful    Medicine is not helping    Breathing getting harder    Trouble walking or talking    Nose opens wide to breathe       1. Take your rescue medicine NOW  2. If your provider has prescribed an oral steroid medicine, start taking it NOW  3. Call your doctor NOW  4. If you are still in the Red Zone after 20 minutes and you have not  reached your doctor:    Take your rescue medicine again and    Call 911 or go to the emergency room right away    See your regular doctor within 2 weeks of an Emergency Room or Urgent Care visit for follow-up treatment.          Annual Reminders:  Meet with Asthma Educator,  Flu Shot in the Fall, consider Pneumonia Vaccination for patients with asthma (aged 19 and older).    Pharmacy:    [id number] - [place], [place] - [address] [facility] [address] [id number] [place], [place] - [address] [facility] [id number] [facility] [address]    Electronically signed by Ok hSepherd MD   Date: 05/20/21                      Asthma Triggers  How To Control Things That Make Your Asthma Worse    Triggers are things that make your asthma worse.  Look at the list below to help you find your triggers and what you can do about them.  You can help prevent asthma flare-ups by staying away from your triggers.      Trigger                                                          What you can do   Cigarette Smoke  Tobacco smoke can make asthma worse. Do not allow smoking in your home, car or around you.  Be sure no one smokes at a child s day care or school.  If you smoke, ask your health care provider for ways to help you quit.  Ask family members to quit too.  Ask your health care provider for a referral to Quit Plan to help you quit smoking, or call 2-477-362-PLAN.     Colds, Flu, Bronchitis  These are common triggers of asthma. Wash your hands often.  Don t touch your eyes, nose or mouth.  Get a flu shot every year.     Dust Mites  These are tiny bugs that live in cloth or carpet. They are too small to see. Wash sheets and blankets in hot water every week.   Encase pillows and mattress in dust mite proof covers.  Avoid having carpet if you can. If you have carpet, vacuum weekly.   Use a dust mask and HEPA vacuum.   Pollen and Outdoor Mold  Some people are allergic to trees, grass, or weed pollen, or  molds. Try to keep your windows closed.  Limit time out doors when pollen count is high.   Ask you health care provider about taking medicine during allergy season.     Animal Dander  Some people are allergic to skin flakes, urine or saliva from pets with fur or feathers. Keep pets with fur or feathers out of your home.    If you can t keep the pet outdoors, then keep the pet out of your bedroom.  Keep the bedroom door closed.  Keep pets off cloth furniture and away from stuffed toys.     Mice, Rats, and Cockroaches   Some people are allergic to the waste from these pests.   Cover food and garbage.  Clean up spills and food crumbs.  Store grease in the refrigerator.   Keep food out of the bedroom.   Indoor Mold  This can be a trigger if your home has high moisture. Fix leaking faucets, pipes, or other sources of water.   Clean moldy surfaces.  Dehumidify basement if it is damp and smelly.   Smoke, Strong Odors, and Sprays  These can reduce air quality. Stay away from strong odors and sprays, such as perfume, powder, hair spray, paints, smoke incense, paint, cleaning products, candles and new carpet.   Exercise or Sports  Some people with asthma have this trigger. Be active!  Ask your doctor about taking medicine before sports or exercise to prevent symptoms.    Warm up for 5-10 minutes before and after sports or exercise.     Other Triggers of Asthma  Cold air:  Cover your nose and mouth with a scarf.  Sometimes laughing or crying can be a trigger.  Some medicines and food can trigger asthma.

## 2021-05-20 NOTE — PROGRESS NOTES
"    Assessment & Plan     BURR (dyspnea on exertion)  Improvement and infiltrate on x-ray and symptoms improved.  Improvement in shortness of breath with inhaler.  Appears to be possible asthma contribution and will have trial of daily inhaled steroid below.  Will recommend spirometry when this can be done post COVID-19.  Continue with sleep study planned.  - XR Chest 2 Views  - fluticasone-salmeterol (AIRDUO RESPICLICK) 113-14 MCG/ACT inhaler; Inhale 1 puff into the lungs 2 times daily    Tinea versicolor  - itraconazole (SPORANOX) 100 MG capsule; Take 2 capsules (200 mg) by mouth daily for 5 days  - ketoconazole (NIZORAL) 2 % external shampoo; Apply topically daily as needed for itching or irritation    Morbid obesity (H)  Continue to work on diet and activity.               BMI:   Estimated body mass index is 50.15 kg/m  as calculated from the following:    Height as of this encounter: 1.715 m (5' 7.5\").    Weight as of this encounter: 147.4 kg (325 lb).           Return in about 3 months (around 8/20/2021) for preventative care visit.    Ok Shepherd MD  [facility] Wet[facility] is a 50 year old who presents for the following health issues     History of Present Illness     Asthma:  He presents for follow up of asthma.  He has some cough, some wheezing, and no shortness of breath. He is using a relief medication a few times a week. He does not have a controller medication. Patient is aware of the following triggers: same as previous visit. The patient has not had a visit to the Emergency Room, Urgent Care or Hospital due to asthma since the last clinic visit.     He eats 2-3 servings of fruits and vegetables daily.He consumes 2 sweetened beverage(s) daily.He exercises with enough effort to increase his heart rate 10 to 19 minutes per day.  He exercises with enough effort to increase his heart rate 3 or less days per week.   He is taking medications regularly.     Has had " "improvement in breathing since last visit though continues to be shortness of breath at times.  Has improvement with using inhaler.    As rash over chest and arms that is itchy.    Review of Systems         Objective    /80 (BP Location: Right arm, Patient Position: Sitting, Cuff Size: Adult Large)   Pulse 74   Temp 97.9  F (36.6  C) (Oral)   Resp 20   Ht 1.715 m (5' 7.5\")   Wt 147.4 kg (325 lb)   BMI 50.15 kg/m    Body mass index is 50.15 kg/m .  Physical Exam   GENERAL: alert, no distress and obese  RESP: lungs clear to auscultation - no rales, rhonchi or wheezes  CV: regular rate and rhythm, normal S1 S2, no S3 or S4, no murmur, click or rub, no peripheral edema and peripheral pulses strong  SKIN: Erythematous rash over chest and shoulders slightly raised and mild flaking    X-ray chest ordered and interpreted in the office today. X-ray shows: clear.  Radiology read pending.            "

## 2021-05-20 NOTE — LETTER
May 20, 2021      Mike Schafer   [id number]  [facility] [id number]        To Whom It May Concern:    Mike Schafer was seen in our clinic today on Thursday, May 20 2021. He may return to work without restrictions.      Sincerely,        Ok Shepherd MD

## 2021-05-26 NOTE — PROGRESS NOTES
Rashard is a 50 year old who is being evaluated via a billable video visit.  \    Does patient have any form of state insurance? [facility]    Do you have wifi? Yes  Do you have a smart phone? Yes   Can you download an srini on your phone comfortably with out assistance? Yes   Can you watch a Youtube video? Yes     How would you like to obtain your AVS? MyChart  If the video visit is dropped, the invitation should be resent by: Send to e-mail at: [email or web address]  Will anyone else be joining your video visit? Ester Miranda MA on 5/26/2021 at 2:41 PM      Video-Visit Details  Type of service:  Video Visit  Video Start Time: 2:30 PM  Video End Time: 3:12 PM    Originating Location (pt. Location): Home    Distant Location (provider location):  [facility]     Platform used for Video Visit: [employer]       Sleep Consultation:    Date on this visit: 5/27/2021    Mike Schafer is sent by Ok Shepherd for a sleep consultation regarding snoring, gasping, choking, daytime somnolence, possible GIACOMO.    Primary Physician: Keron Corbett     Mike Schafer is a 50-year-old male with a past medical history pertinent for hypertension, asthma, and morbid obesity who presents via video visit today with reports of frequent snoring, gasping, choking, poor quality of sleep and difficulty sleeping later in the morning for over the last 1-2 years.  Patient was recently treated for atypical pneumonia which has apparently resolved now.    Mike goes to sleep at 10:30 - 11:00 PM during the week. He wakes up at 5:30 - 6:00 AM without an alarm. He falls asleep in 30-60 minutes.  Mike has difficulty falling asleep.  He wakes up 2 times a night for 5 minutes before falling back to sleep.  Mike wakes up to go to the bathroom, uncertain reasons and GERD.  On weekends, Mike goes to sleep at about the same time. Patient gets an average of 6-7 hours of sleep per night.     Patient does  watch TV in bed and leave the TV on in bed 2 hours. and does not use electronics in bed, worry in bed about anything and read in bed.     Mike does not do shift work.  He works day shifts.  .    Mike does snore every night. Patient does have a regular bed partner. There is report of snoring, gasping, choking and poor quality of sleep.  He does not have witnessed apneas. They never sleep separately.  Patient sleeps on his side. He has occasional morning headaches and morning confusion, denies occasional snort arousals, morning dry mouth and restless legs. Mike has occasional bruxism and denies any sleep walking, sleep talking, dream enactment, sleep paralysis, cataplexy and hypnogogic/hypnopompic hallucinations.    He confirms sleep walking and denies sleep talking, enuresis and sleep terrors as a child.  Mike has claustrophobia, reflux at night and heartburn, denies difficulty breathing through his nose and depression.      Mike has gained 30 pounds in the last year, but has been dieting and has lost about 15 pounds over the last month.  Patient describes themself as a night person.  He would prefer to go to sleep at 2:00 AM and wake up at 10:00 AM.  Patient's [place] Sleepiness score 11/24 consistent with moderate daytime sleepiness.      Mike naps 4-5 times per week for  minutes, feels sometimes refreshed after naps. He takes frequent inadvertant naps.  He denies closing eyes, dozing and falling asleep while driving. Patient was counseled on the importance of driving while alert, to pull over if drowsy, or nap before getting into the vehicle if sleepy.  He uses 1-2 sodas/day. Last caffeine intake is usually before 6:00 PM.    Allergies:    No Known Allergies    Medications:    Current Outpatient Medications   Medication Sig Dispense Refill     albuterol (PROAIR HFA/PROVENTIL HFA/VENTOLIN HFA) 108 (90 Base) MCG/ACT inhaler INHALE 2 PUFFS INTO THE LUNGS EVERY 6 HOURS 8.5 g 0      fluticasone-salmeterol (AIRDUO RESPICLICK) 113-14 MCG/ACT inhaler Inhale 1 puff into the lungs 2 times daily 1 each 1     ketoconazole (NIZORAL) 2 % external shampoo Apply topically daily as needed for itching or irritation 120 mL 4     metoprolol succinate ER (TOPROL-XL) 50 MG 24 hr tablet Take 1 tablet (50 mg) by mouth daily 30 tablet 11       Problem List:  Patient Active Problem List    Diagnosis Date Noted     Morbid obesity (H) 05/20/2021     Priority: Medium     Benign essential hypertension 07/12/2016     Priority: Medium        Past Medical/Surgical History:  Past Medical History:   Diagnosis Date     Hypertension      Past Surgical History:   Procedure Laterality Date     SOFT TISSUE SURGERY  3/17/20       Social History:  Social History     Socioeconomic History     Marital status:      Spouse name: Keke     Number of children: 0     Years of education: Not on file     Highest education level: 12th grade   Occupational History     Occupation: [employer]     Employer: [employer]     Occupation:      Comment: [employer]   Social Needs     Financial resource strain: Not hard at all     Food insecurity     Worry: Never true     Inability: Never true     Transportation needs     Medical: No     Non-medical: No   Tobacco Use     Smoking status: Never Smoker     Smokeless tobacco: Never Used   Substance and Sexual Activity     Alcohol use: Yes     Alcohol/week: 0.0 standard drinks     Frequency: Monthly or less     Drinks per session: 1 or 2     Binge frequency: Less than monthly     Drug use: No     Sexual activity: Yes     Partners: Female     Birth control/protection: None   Lifestyle     Physical activity     Days per week: 5 days     Minutes per session: 20 min     Stress: Only a little   Relationships     Social connections     Talks on phone: More than three times a week     Gets together: Twice a week     Attends Sikh service: Never     Active member of club or organization: No      Attends meetings of clubs or organizations: Never     Relationship status:      Intimate partner violence     Fear of current or ex partner: Not on file     Emotionally abused: Not on file     Physically abused: Not on file     Forced sexual activity: Not on file   Other Topics Concern     Parent/sibling w/ CABG, MI or angioplasty before 65F 55M? No   Social History Narrative     Not on file       Family History:  Family History   Problem Relation Age of Onset     Breast Cancer Mother      Diabetes Father      Other Cancer Maternal Grandmother      Other Cancer Paternal Grandfather        Review of Systems:  CONSTITUTIONAL: NEGATIVE for weight gain/loss, fever, chills, sweats or night sweats, drug allergies.  EYES: NEGATIVE for changes in vision, blind spots, double vision.  ENT: NEGATIVE for ear pain, sore throat, sinus pain, post-nasal drip, runny nose, bloody nose  CARDIAC: NEGATIVE for fast heartbeats or fluttering in chest, chest pain or pressure, breathlessness when lying flat, swollen legs or swollen feet.  CARDIAC:  POSITIVE for swollen left foot  NEUROLOGIC: NEGATIVE headaches, weakness or numbness in the legs.  NEUROLOGIC:  POSITIVE for numbness in the arms  DERMATOLOGIC: NEGATIVE for new moles or change in mole(s)  DERMATOLOGIC:  POSITIVE for  rashes  PULMONARY: NEGATIVE SOB at rest, SOB with activity, dry cough, productive cough, coughing up blood, wheezing or whistling when breathing.    PULMONARY:  POSITIVE for  SOB with activity and dry cough  GASTROINTESTINAL: NEGATIVE for nausea or vomitting, loose or watery stools, fat or grease in stools, constipation, abdominal pain, bowel movements black in color or blood noted.  GASTROINTESTINAL:  POSITIVE for acid reflux  GENITOURINARY: NEGATIVE for pain during urination, blood in urine, urinating more frequently than usual, irregular menstrual periods.  GENITOURINARY:  POSITIVE for  urinating more frequently than usual  MUSCULOSKELETAL: NEGATIVE for  "muscle pain, bone or joint pain, swollen joints.  ENDOCRINE: NEGATIVE for increased thirst or urination, diabetes.  ENDOCRINE:  POSITIVE for  increased urination  LYMPHATIC: NEGATIVE for swollen lymph nodes, lumps or bumps in the breasts or nipple discharge.    Physical Examination:  Vitals: There were no vitals taken for this visit.  Estimated body mass index is 50.15 kg/m  as calculated from the following:    Height: 1.715 m (5' 7.5\").    Weight: 147.4 kg (325 lb).          Picher Total Score 5/26/2021   Total score - Picher 11       DELMER Total Score: 18 (05/26/21 1400)    GENERAL APPEARANCE: healthy, alert, no distress and cooperative  EYES: Eyes grossly normal to inspection  RESP: Unlabored, easy breathing with normal conversational speech  CV: color normal  NEURO: Alert and oriented x3, speech normal  PSYCH: mentation appears normal and affect normal/bright  Mallampati Class: III.  Tonsillar Stage: 1  hidden by pillars.    Impression/Plan:  1. Snoring  2. Gasping for breath  3. Daytime somnolence  4. Morbid obesity with BMI of 50.0-59.9, adult (H)  - SLEEP EVALUATION & MANAGEMENT REFERRAL - [facility]  [phone] (Age 18 and up)  - Comprehensive Sleep Study; Future    This is a pleasant 50-year-old male with a past medical history pertinent for hypertension, asthma, and morbid obesity who presents with reports of frequent snoring, gasping, choking, poor quality of sleep, and difficulty sleeping later in the morning for over the last 1-2 years.  He reports approximately 30 pound weight gain over the last year, however, has been successful in losing 15 pounds with diet modification over the last 3 weeks.  We discussed the role of weight and BMI as a pertains to obstructive sleep apnea.  I have encouraged the patient in his pursuit for continued weight loss.  He was recently treated for an atypical pneumonia which may be suspicious for silent reflux/aspiration given his history " of some waking with GERD at times.  His symptoms are consistent with probable obstructive sleep apnea.    We discussed the pathophysiology of obstructive sleep apnea and the risks associated with untreated GIACOMO.  We also discussed the pros and cons of in lab polysomnography versus home sleep testing.  The patient has elected to undergo in lab polysomnography (PSG) to further evaluate his symptoms of possible obstructive sleep apnea.      Literature provided regarding sleep apnea.      He will follow up with me in approximately two weeks after his sleep study has been competed to review the results and discuss plan of care.       Polysomnography reviewed.  Obstructive sleep apnea reviewed.  Complications of untreated sleep apnea were reviewed.      RENETTA Choudhary CNP  Sleep Medicine    50 minutes were spent on the date of the encounter doing chart review, history and exam, documentation and further activities as noted above.     CC: Ok Shepherd

## 2021-05-26 NOTE — PATIENT INSTRUCTIONS
Your BMI is There is no height or weight on file to calculate BMI.  Weight management is a personal decision.  If you are interested in exploring weight loss strategies, the following discussion covers the approaches that may be successful. Body mass index (BMI) is one way to tell whether you are at a healthy weight, overweight, or obese. It measures your weight in relation to your height.  A BMI of 18.5 to 24.9 is in the healthy range. A person with a BMI of 25 to 29.9 is considered overweight, and someone with a BMI of 30 or greater is considered obese. More than two-thirds of American adults are considered overweight or obese.  Being overweight or obese increases the risk for further weight gain. Excess weight may lead to heart disease and diabetes.  Creating and following plans for healthy eating and physical activity may help you improve your health.  Weight control is part of healthy lifestyle and includes exercise, emotional health, and healthy eating habits. Careful eating habits lifelong are the mainstay of weight control. Though there are significant health benefits from weight loss, long-term weight loss with diet alone may be very difficult to achieve- studies show long-term success with dietary management in less than 10% of people. Attaining a healthy weight may be especially difficult to achieve in those with severe obesity. In some cases, medications, devices and surgical management might be considered.  What can you do?  If you are overweight or obese and are interested in methods for weight loss, you should discuss this with your provider.     Consider reducing daily calorie intake by 500 calories.     Keep a food journal.     Avoiding skipping meals, consider cutting portions instead.    Diet combined with exercise helps maintain muscle while optimizing fat loss. Strength training is particularly important for building and maintaining muscle mass. Exercise helps reduce stress, increase energy,  and improves fitness. Increasing exercise without diet control, however, may not burn enough calories to loose weight.       Start walking three days a week 10-20 minutes at a time    Work towards walking thirty minutes five days a week     Eventually, increase the speed of your walking for 1-2 minutes at time    In addition, we recommend that you review healthy lifestyles and methods for weight loss available through the National Institutes of Health patient information sites:  http://win.niddk.nih.gov/publications/index.htm    And look into health and wellness programs that may be available through your health insurance provider, employer, local community center, or [employer] club.    Weight management plan: Patient was referred to their PCP to discuss a diet and exercise plan.  Patient Education     What Are Snoring and Obstructive Sleep Apnea?  If you ve ever had a stuffed-up nose, you know the feeling of trying to breathe through a very narrow passageway. This is what happens in your throat when you snore. While you sleep, structures in your throat partly block your air passage. This makes the passage narrow and hard to breathe through. In some cases, the entire passage may become blocked so you can t breathe at all. This is called obstructive sleep apnea.      Snoring       Obstructive sleep apnea      Snoring  If your throat structures are too large or the muscles relax too much during sleep, the air passage may be partly blocked for a brief moment. But the air eventually passes through. As air from the nose or mouth passes around this blockage, the throat structures vibrate. This causes the familiar sound of snoring. This snoring sound can be loud and may wake up others, or even themselves, during the night. Snoring gets worse as more and more of the air passage is blocked.   Obstructive sleep apnea  If the structures partly or completely block the throat, air can t flow to the lungs at all. This is  called hypopnea (decreased breathing) or apnea (meaning  no breathing ). The lungs aren t getting fresh air. So the brain tells the body to wake up just enough to tighten the muscles and unblock the air passage. With a loud gasp, breathing starts again. This process may be repeated over and over again during the night. This can make your sleep fragmented with lighter stages of sleep. You may not remember waking up many times during the night however due to lighter sleep you will most likely feel tired the next day. The lack of sleep and fresh air can also strain your lungs, heart, and other organs. This may lead to problems such as high blood pressure, diabetes, behavioral disorders, heart attack, or stroke.   Problems in the nose and jaw  Problems in the structure of the nose may block breathing. A crooked (deviated) septum or swollen turbinates can make snoring worse or lead to apnea. Also, a receding jaw may make the tongue sit too far back. Then it s more likely to block the airway when you re asleep.   [employer] last reviewed this educational content on 9/1/2019 2000-2021 [employer]. All rights reserved. This information is not intended as a substitute for professional medical care. Always follow your healthcare professional's instructions.

## 2021-05-27 ENCOUNTER — VIRTUAL VISIT (OUTPATIENT)
Dept: SLEEP MEDICINE | Facility: CLINIC | Age: 51
End: 2021-05-27
Payer: COMMERCIAL

## 2021-05-27 DIAGNOSIS — R06.89 GASPING FOR BREATH: Primary | ICD-10-CM

## 2021-05-27 DIAGNOSIS — E66.01 MORBID OBESITY WITH BMI OF 50.0-59.9, ADULT (H): ICD-10-CM

## 2021-05-27 DIAGNOSIS — R40.0 DAYTIME SOMNOLENCE: ICD-10-CM

## 2021-05-27 DIAGNOSIS — R06.83 SNORING: ICD-10-CM

## 2021-05-27 PROCEDURE — 99204 OFFICE O/P NEW MOD 45 MIN: CPT | Mod: GT | Performed by: NURSE PRACTITIONER

## 2021-07-16 DIAGNOSIS — I10 BENIGN ESSENTIAL HYPERTENSION: ICD-10-CM

## 2021-07-16 RX ORDER — METOPROLOL SUCCINATE 50 MG/1
TABLET, EXTENDED RELEASE ORAL
Qty: 30 TABLET | Refills: 0 | Status: SHIPPED | OUTPATIENT
Start: 2021-07-16 | End: 2021-08-11

## 2021-07-16 NOTE — TELEPHONE ENCOUNTER
Prescription approved per [facility] Refill Protocol.    Katrin Mulligan RN

## 2021-07-19 ENCOUNTER — THERAPY VISIT (OUTPATIENT)
Dept: SLEEP MEDICINE | Facility: CLINIC | Age: 51
End: 2021-07-19
Payer: COMMERCIAL

## 2021-07-19 DIAGNOSIS — G47.33 OSA (OBSTRUCTIVE SLEEP APNEA): ICD-10-CM

## 2021-07-19 DIAGNOSIS — R40.0 DAYTIME SOMNOLENCE: ICD-10-CM

## 2021-07-19 DIAGNOSIS — E66.01 MORBID OBESITY WITH BMI OF 50.0-59.9, ADULT (H): ICD-10-CM

## 2021-07-19 DIAGNOSIS — R06.89 GASPING FOR BREATH: ICD-10-CM

## 2021-07-19 DIAGNOSIS — R06.83 SNORING: ICD-10-CM

## 2021-07-19 PROCEDURE — 95810 POLYSOM 6/> YRS 4/> PARAM: CPT | Performed by: INTERNAL MEDICINE

## 2021-07-21 PROBLEM — G47.33 OSA (OBSTRUCTIVE SLEEP APNEA): Status: ACTIVE | Noted: 2021-07-21

## 2021-07-22 LAB — SLPCOMP: NORMAL

## 2021-08-09 DIAGNOSIS — I10 BENIGN ESSENTIAL HYPERTENSION: ICD-10-CM

## 2021-08-11 RX ORDER — METOPROLOL SUCCINATE 50 MG/1
TABLET, EXTENDED RELEASE ORAL
Qty: 30 TABLET | Refills: 0 | Status: SHIPPED | OUTPATIENT
Start: 2021-08-11 | End: 2021-08-24

## 2021-08-23 ASSESSMENT — ENCOUNTER SYMPTOMS
HEMATOCHEZIA: 0
FREQUENCY: 0
WEAKNESS: 0
COUGH: 0
MYALGIAS: 0
HEADACHES: 0
CONSTIPATION: 0
NAUSEA: 0
JOINT SWELLING: 0
ABDOMINAL PAIN: 0
HEARTBURN: 0
EYE PAIN: 0
DIZZINESS: 0
SHORTNESS OF BREATH: 0
SORE THROAT: 0
PARESTHESIAS: 0
PALPITATIONS: 0
HEMATURIA: 0
NERVOUS/ANXIOUS: 0
CHILLS: 0
FEVER: 0
DIARRHEA: 0
DYSURIA: 0
ARTHRALGIAS: 0

## 2021-08-24 ENCOUNTER — OFFICE VISIT (OUTPATIENT)
Dept: FAMILY MEDICINE | Facility: CLINIC | Age: 51
End: 2021-08-24
Payer: COMMERCIAL

## 2021-08-24 VITALS
BODY MASS INDEX: 47.74 KG/M2 | SYSTOLIC BLOOD PRESSURE: 139 MMHG | DIASTOLIC BLOOD PRESSURE: 86 MMHG | OXYGEN SATURATION: 100 % | HEART RATE: 75 BPM | HEIGHT: 68 IN | RESPIRATION RATE: 18 BRPM | WEIGHT: 315 LBS | TEMPERATURE: 97.8 F

## 2021-08-24 DIAGNOSIS — Z11.59 NEED FOR HEPATITIS C SCREENING TEST: ICD-10-CM

## 2021-08-24 DIAGNOSIS — G47.33 OSA (OBSTRUCTIVE SLEEP APNEA): ICD-10-CM

## 2021-08-24 DIAGNOSIS — E66.01 MORBID OBESITY (H): ICD-10-CM

## 2021-08-24 DIAGNOSIS — Z13.220 LIPID SCREENING: ICD-10-CM

## 2021-08-24 DIAGNOSIS — Z23 NEED FOR TDAP VACCINATION: ICD-10-CM

## 2021-08-24 DIAGNOSIS — I10 ESSENTIAL HYPERTENSION: ICD-10-CM

## 2021-08-24 DIAGNOSIS — I10 BENIGN ESSENTIAL HYPERTENSION: ICD-10-CM

## 2021-08-24 DIAGNOSIS — Z11.4 SCREENING FOR HIV (HUMAN IMMUNODEFICIENCY VIRUS): ICD-10-CM

## 2021-08-24 DIAGNOSIS — K76.0 NAFLD (NONALCOHOLIC FATTY LIVER DISEASE): ICD-10-CM

## 2021-08-24 DIAGNOSIS — Z00.00 ROUTINE GENERAL MEDICAL EXAMINATION AT A HEALTH CARE FACILITY: Primary | ICD-10-CM

## 2021-08-24 LAB
ALBUMIN SERPL-MCNC: 3.6 G/DL (ref 3.4–5)
ALP SERPL-CCNC: 85 U/L (ref 40–150)
ALT SERPL W P-5'-P-CCNC: 89 U/L (ref 0–70)
ANION GAP SERPL CALCULATED.3IONS-SCNC: 5 MMOL/L (ref 3–14)
AST SERPL W P-5'-P-CCNC: 54 U/L (ref 0–45)
BILIRUB SERPL-MCNC: 1.1 MG/DL (ref 0.2–1.3)
BUN SERPL-MCNC: 16 MG/DL (ref 7–30)
CALCIUM SERPL-MCNC: 9 MG/DL (ref 8.5–10.1)
CHLORIDE BLD-SCNC: 108 MMOL/L (ref 94–109)
CHOLEST SERPL-MCNC: 180 MG/DL
CO2 SERPL-SCNC: 26 MMOL/L (ref 20–32)
CREAT SERPL-MCNC: 0.81 MG/DL (ref 0.66–1.25)
FASTING STATUS PATIENT QL REPORTED: YES
FERRITIN SERPL-MCNC: 159 NG/ML (ref 26–388)
GFR SERPL CREATININE-BSD FRML MDRD: >90 ML/MIN/1.73M2
GLUCOSE BLD-MCNC: 89 MG/DL (ref 70–99)
HCV AB SERPL QL IA: NONREACTIVE
HDLC SERPL-MCNC: 43 MG/DL
HIV 1+2 AB+HIV1 P24 AG SERPL QL IA: NONREACTIVE
LDLC SERPL CALC-MCNC: 118 MG/DL
NONHDLC SERPL-MCNC: 137 MG/DL
POTASSIUM BLD-SCNC: 3.9 MMOL/L (ref 3.4–5.3)
PROT SERPL-MCNC: 7.4 G/DL (ref 6.8–8.8)
SODIUM SERPL-SCNC: 139 MMOL/L (ref 133–144)
TRIGL SERPL-MCNC: 93 MG/DL

## 2021-08-24 PROCEDURE — 36415 COLL VENOUS BLD VENIPUNCTURE: CPT | Performed by: FAMILY MEDICINE

## 2021-08-24 PROCEDURE — 87389 HIV-1 AG W/HIV-1&-2 AB AG IA: CPT | Performed by: FAMILY MEDICINE

## 2021-08-24 PROCEDURE — 86803 HEPATITIS C AB TEST: CPT | Performed by: FAMILY MEDICINE

## 2021-08-24 PROCEDURE — 80061 LIPID PANEL: CPT | Performed by: FAMILY MEDICINE

## 2021-08-24 PROCEDURE — 82728 ASSAY OF FERRITIN: CPT | Performed by: FAMILY MEDICINE

## 2021-08-24 PROCEDURE — 80053 COMPREHEN METABOLIC PANEL: CPT | Performed by: FAMILY MEDICINE

## 2021-08-24 PROCEDURE — 99396 PREV VISIT EST AGE 40-64: CPT | Performed by: FAMILY MEDICINE

## 2021-08-24 RX ORDER — METOPROLOL SUCCINATE 50 MG/1
50 TABLET, EXTENDED RELEASE ORAL DAILY
Qty: 90 TABLET | Refills: 4 | Status: SHIPPED | OUTPATIENT
Start: 2021-08-24 | End: 2022-10-24

## 2021-08-24 ASSESSMENT — ENCOUNTER SYMPTOMS
NAUSEA: 0
NERVOUS/ANXIOUS: 0
JOINT SWELLING: 0
CHILLS: 0
COUGH: 0
PARESTHESIAS: 0
ARTHRALGIAS: 0
SORE THROAT: 0
WEAKNESS: 0
DIARRHEA: 0
HEMATOCHEZIA: 0
MYALGIAS: 0
EYE PAIN: 0
FEVER: 0
HEARTBURN: 0
PALPITATIONS: 0
SHORTNESS OF BREATH: 0
HEMATURIA: 0
DIZZINESS: 0
CONSTIPATION: 0
HEADACHES: 0
ABDOMINAL PAIN: 0
DYSURIA: 0
FREQUENCY: 0

## 2021-08-24 ASSESSMENT — MIFFLIN-ST. JEOR: SCORE: 2265.59
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
## 2021-09-03 ENCOUNTER — OFFICE VISIT (OUTPATIENT)
Dept: FAMILY MEDICINE | Facility: CLINIC | Age: 51
End: 2021-09-03
Payer: COMMERCIAL

## 2021-09-03 VITALS
WEIGHT: 311 LBS | BODY MASS INDEX: 47.13 KG/M2 | RESPIRATION RATE: 16 BRPM | SYSTOLIC BLOOD PRESSURE: 132 MMHG | HEIGHT: 68 IN | HEART RATE: 54 BPM | TEMPERATURE: 97.9 F | DIASTOLIC BLOOD PRESSURE: 82 MMHG | OXYGEN SATURATION: 97 %

## 2021-09-03 DIAGNOSIS — L91.8 SKIN TAG: Primary | ICD-10-CM

## 2021-09-03 DIAGNOSIS — Z23 NEED FOR TDAP VACCINATION: ICD-10-CM

## 2021-09-03 PROCEDURE — 90715 TDAP VACCINE 7 YRS/> IM: CPT | Performed by: FAMILY MEDICINE

## 2021-09-03 PROCEDURE — 11201 RMVL SKIN TAGS EA ADDL 10: CPT | Performed by: FAMILY MEDICINE

## 2021-09-03 PROCEDURE — 11200 RMVL SKIN TAGS UP TO&INC 15: CPT | Performed by: FAMILY MEDICINE

## 2021-09-03 PROCEDURE — 90471 IMMUNIZATION ADMIN: CPT | Performed by: FAMILY MEDICINE

## 2021-09-03 PROCEDURE — 91301 PR COVID VAC MODERNA 100 MCG/0.5 ML IM: CPT | Performed by: FAMILY MEDICINE

## 2021-09-03 PROCEDURE — 0011A PR COVID VAC MODERNA 100 MCG/0.5 ML IM: CPT | Performed by: FAMILY MEDICINE

## 2021-09-03 ASSESSMENT — MIFFLIN-ST. JEOR: SCORE: 2240.19

## 2021-09-03 NOTE — NURSING NOTE
Prior to immunization administration, verified patients identity using patient s name and date of birth. Please see Immunization Activity for additional information.     Screening Questionnaire for Adult Immunization    Are you sick today?   No   Do you have allergies to medications, food, a vaccine component or latex?   No   Have you ever had a serious reaction after receiving a vaccination?   No   Do you have a long-term health problem with heart, lung, kidney, or metabolic disease (e.g., diabetes), asthma, a blood disorder, no spleen, complement component deficiency, a cochlear implant, or a spinal fluid leak?  Are you on long-term aspirin therapy?   No   Do you have cancer, leukemia, HIV/AIDS, or any other immune system problem?   No   Do you have a parent, brother, or sister with an immune system problem?   No   In the past 3 months, have you taken medications that affect  your immune system, such as prednisone, other steroids, or anticancer drugs; drugs for the treatment of rheumatoid arthritis, Crohn s disease, or psoriasis; or have you had radiation treatments?   No   Have you had a seizure, or a brain or other nervous system problem?   No   During the past year, have you received a transfusion of blood or blood    products, or been given immune (gamma) globulin or antiviral drug?   No   For women: Are you pregnant or is there a chance you could become       pregnant during the next month?   No   Have you received any vaccinations in the past 4 weeks?   No     Immunization questionnaire answers were all negative.        Per orders of Dr. kiser, injection of 1st moderna shot and tdap given by Chai Montoya CMA. Patient instructed to remain in clinic for 15 minutes afterwards, and to report any adverse reaction to me immediately.       Screening performed by Chai Montoya CMA on 9/3/2021 at 12:06 PM.

## 2021-09-03 NOTE — PROGRESS NOTES
"  Assessment & Plan     Skin tag  - REMOVAL OF SKIN TAGS, FIRST 15  - REMOVAL OF SKIN TAGS, EA ADDTL 10    Need for Tdap vaccination  - TDAP VACCINE (Adacel, Boostrix)  [[id number]]                 No follow-ups on file.    Ok Shepherd MD  [facility] ALFONSO eWtzel is a 51 year old who presents for the following health issues     History of Present Illness       He eats 2-3 servings of fruits and vegetables daily.He consumes 3 sweetened beverage(s) daily.He exercises with enough effort to increase his heart rate 9 or less minutes per day.  He exercises with enough effort to increase his heart rate 4 days per week.   He is taking medications regularly.    Skin Lesion  Onset/Duration: a few years  Description  Location: face, neck, and back  Color: brown  Border description: raised  Character: raised, painful  Itching: no  Bleeding:  no  Intensity:  mild  Progression of Symptoms:  same and constant  Accompanying signs and symptoms:   Bleeding: no  Scaling: no  Excessive sun exposure/tanning: no  Sunscreen used: no  History:           Any previous history of skin cancer: no  Any family history of melanoma: no  Previous episodes of similar lesion: no  Precipitating or alleviating factors: none  Therapies tried and outcome: none    Review of Systems         Objective    /82 (BP Location: Right arm, Patient Position: Sitting, Cuff Size: Adult Large)   Pulse 54   Temp 97.9  F (36.6  C) (Oral)   Resp 16   Ht 1.727 m (5' 8\")   Wt 141.1 kg (311 lb)   SpO2 97%   BMI 47.29 kg/m    Body mass index is 47.29 kg/m .  Physical Exam   GENERAL: alert, no distress and obese  SKIN: Multiple skin tags on neck, arms, axillae, back    23 skin tags removed as noted above.  Prepped with alcohol, anesthetized with 1% lidocaine with epinephrine, and removed at base with razor blade, hemostasis acheived with silver nitrate.        "

## 2021-09-25 ENCOUNTER — HEALTH MAINTENANCE LETTER (OUTPATIENT)
Age: 51
End: 2021-09-25

## 2021-09-26 NOTE — TELEPHONE ENCOUNTER
I refilled in case he wants it though we are still diagnosing what his issue is he does not need multiple inhalers really this may be short acting issue with atypical pneumonia or other.    Initial (On Arrival)

## 2021-10-01 ENCOUNTER — IMMUNIZATION (OUTPATIENT)
Dept: FAMILY MEDICINE | Facility: CLINIC | Age: 51
End: 2021-10-01
Attending: FAMILY MEDICINE
Payer: COMMERCIAL

## 2021-10-01 DIAGNOSIS — Z23 HIGH PRIORITY FOR 2019-NCOV VACCINE: Primary | ICD-10-CM

## 2021-10-01 PROCEDURE — 99207 PR NO CHARGE LOS: CPT

## 2021-10-01 PROCEDURE — 91301 COVID-19,PF,MODERNA (18+ YRS): CPT

## 2021-10-01 PROCEDURE — 0012A COVID-19,PF,MODERNA (18+ YRS): CPT

## 2021-10-15 ENCOUNTER — OFFICE VISIT (OUTPATIENT)
Dept: URGENT CARE | Facility: URGENT CARE | Age: 51
End: 2021-10-15
Payer: COMMERCIAL

## 2021-10-15 ENCOUNTER — E-VISIT (OUTPATIENT)
Dept: FAMILY MEDICINE | Facility: CLINIC | Age: 51
End: 2021-10-15
Payer: COMMERCIAL

## 2021-10-15 ENCOUNTER — TELEPHONE (OUTPATIENT)
Dept: FAMILY MEDICINE | Facility: CLINIC | Age: 51
End: 2021-10-15

## 2021-10-15 VITALS
OXYGEN SATURATION: 98 % | WEIGHT: 315 LBS | SYSTOLIC BLOOD PRESSURE: 150 MMHG | RESPIRATION RATE: 16 BRPM | HEART RATE: 66 BPM | TEMPERATURE: 97.8 F | DIASTOLIC BLOOD PRESSURE: 98 MMHG | BODY MASS INDEX: 48.12 KG/M2

## 2021-10-15 DIAGNOSIS — R21 RASH AND NONSPECIFIC SKIN ERUPTION: Primary | ICD-10-CM

## 2021-10-15 DIAGNOSIS — R21 RASH: Primary | ICD-10-CM

## 2021-10-15 LAB
BASOPHILS # BLD AUTO: 0 10E3/UL (ref 0–0.2)
BASOPHILS NFR BLD AUTO: 0 %
EOSINOPHIL # BLD AUTO: 0.2 10E3/UL (ref 0–0.7)
EOSINOPHIL NFR BLD AUTO: 2 %
ERYTHROCYTE [DISTWIDTH] IN BLOOD BY AUTOMATED COUNT: 13.4 % (ref 10–15)
ERYTHROCYTE [SEDIMENTATION RATE] IN BLOOD BY WESTERGREN METHOD: 19 MM/HR (ref 0–20)
HCT VFR BLD AUTO: 42.6 % (ref 40–53)
HGB BLD-MCNC: 14.4 G/DL (ref 13.3–17.7)
LYMPHOCYTES # BLD AUTO: 2.4 10E3/UL (ref 0.8–5.3)
LYMPHOCYTES NFR BLD AUTO: 26 %
MCH RBC QN AUTO: 30.4 PG (ref 26.5–33)
MCHC RBC AUTO-ENTMCNC: 33.8 G/DL (ref 31.5–36.5)
MCV RBC AUTO: 90 FL (ref 78–100)
MONOCYTES # BLD AUTO: 0.8 10E3/UL (ref 0–1.3)
MONOCYTES NFR BLD AUTO: 8 %
NEUTROPHILS # BLD AUTO: 5.8 10E3/UL (ref 1.6–8.3)
NEUTROPHILS NFR BLD AUTO: 63 %
PLATELET # BLD AUTO: 307 10E3/UL (ref 150–450)
RBC # BLD AUTO: 4.73 10E6/UL (ref 4.4–5.9)
WBC # BLD AUTO: 9.3 10E3/UL (ref 4–11)

## 2021-10-15 PROCEDURE — 99214 OFFICE O/P EST MOD 30 MIN: CPT | Performed by: FAMILY MEDICINE

## 2021-10-15 PROCEDURE — 99000 SPECIMEN HANDLING OFFICE-LAB: CPT | Performed by: FAMILY MEDICINE

## 2021-10-15 PROCEDURE — 36415 COLL VENOUS BLD VENIPUNCTURE: CPT | Performed by: FAMILY MEDICINE

## 2021-10-15 PROCEDURE — 85652 RBC SED RATE AUTOMATED: CPT | Performed by: FAMILY MEDICINE

## 2021-10-15 PROCEDURE — 99207 PR NON-BILLABLE SERV PER CHARTING: CPT | Performed by: FAMILY MEDICINE

## 2021-10-15 PROCEDURE — 85025 COMPLETE CBC W/AUTO DIFF WBC: CPT | Performed by: FAMILY MEDICINE

## 2021-10-15 PROCEDURE — 80053 COMPREHEN METABOLIC PANEL: CPT | Performed by: FAMILY MEDICINE

## 2021-10-15 PROCEDURE — 86060 ANTISTREPTOLYSIN O TITER: CPT | Mod: 90 | Performed by: FAMILY MEDICINE

## 2021-10-15 RX ORDER — PREDNISONE 20 MG/1
40 TABLET ORAL DAILY
Qty: 10 TABLET | Refills: 0 | Status: SHIPPED | OUTPATIENT
Start: 2021-10-15 | End: 2021-10-20

## 2021-10-15 NOTE — PROGRESS NOTES
SUBJECTIVE:  Chief Complaint   Patient presents with     Derm Problem     Mike Schafer is a 51 year old male who presents with a chief complaint of rash.    Had Moderna COVID vaccination #2 about 2 weeks ago, notice rash about 1 week ago.    Developed on top of both feet, has started to increase/spread up on legs, notice more on chest today.  No fever or recent illness.  Denies any itchiness, no changes in product use.  Thought may have gotten bitten initially, has an itchy area on left ankle but this is different from the rest of the rash.  Feels well, denies any fatigue.    Past Medical History:   Diagnosis Date     Hypertension      Current Outpatient Medications   Medication Sig Dispense Refill     albuterol (PROAIR HFA/PROVENTIL HFA/VENTOLIN HFA) 108 (90 Base) MCG/ACT inhaler INHALE 2 PUFFS INTO THE LUNGS EVERY 6 HOURS 8.5 g 0     fluticasone-salmeterol (AIRDUO RESPICLICK) 113-14 MCG/ACT inhaler Inhale 1 puff into the lungs 2 times daily 1 each 1     ketoconazole (NIZORAL) 2 % external shampoo Apply topically daily as needed for itching or irritation 120 mL 4     metoprolol succinate ER (TOPROL-XL) 50 MG 24 hr tablet Take 1 tablet (50 mg) by mouth daily 90 tablet 4     Social History     Tobacco Use     Smoking status: Never Smoker     Smokeless tobacco: Never Used   Substance Use Topics     Alcohol use: Yes     Alcohol/week: 0.0 standard drinks       ROS:  Review of systems negative except as stated above.    EXAM:   BP (!) 150/98 (BP Location: Right arm, Patient Position: Chair, Cuff Size: Adult Large)   Pulse 66   Temp 97.8  F (36.6  C) (Oral)   Resp 16   Wt 143.6 kg (316 lb 8 oz)   SpO2 98%   BMI 48.12 kg/m    GENERAL APPEARANCE: healthy, alert and no distress  EXTREMITIES: peripheral pulses normal  SKIN: petechia rash - small pinpoint redden macules, nonblanching on legs, chest/abdomen.  No vesicles or pustules, no scaling.  No tenderness.  PSYCH: alert, affect bright    Results for orders  placed or performed in visit on 10/15/21   ESR: Erythrocyte sedimentation rate     Status: Normal   Result Value Ref Range    Erythrocyte Sedimentation Rate 19 0 - 20 mm/hr   CBC with platelets and differential     Status: None   Result Value Ref Range    WBC Count 9.3 4.0 - 11.0 10e3/uL    RBC Count 4.73 4.40 - 5.90 10e6/uL    Hemoglobin 14.4 13.3 - 17.7 g/dL    Hematocrit 42.6 40.0 - 53.0 %    MCV 90 78 - 100 fL    MCH 30.4 26.5 - 33.0 pg    MCHC 33.8 31.5 - 36.5 g/dL    RDW 13.4 10.0 - 15.0 %    Platelet Count 307 150 - 450 10e3/uL    % Neutrophils 63 %    % Lymphocytes 26 %    % Monocytes 8 %    % Eosinophils 2 %    % Basophils 0 %    Absolute Neutrophils 5.8 1.6 - 8.3 10e3/uL    Absolute Lymphocytes 2.4 0.8 - 5.3 10e3/uL    Absolute Monocytes 0.8 0.0 - 1.3 10e3/uL    Absolute Eosinophils 0.2 0.0 - 0.7 10e3/uL    Absolute Basophils 0.0 0.0 - 0.2 10e3/uL   CBC with platelets and differential     Status: None    Narrative    The following orders were created for panel order CBC with platelets and differential.  Procedure                               Abnormality         Status                     ---------                               -----------         ------                     CBC with platelets and d...[[id number]]                      Final result                 Please view results for these tests on the individual orders.       ASSESSMENT/PLAN:  (R21) Rash and nonspecific skin eruption  (primary encounter diagnosis)  Comment: generalized  Plan: CBC with platelets and differential, ESR:         Erythrocyte sedimentation rate, Streptolysin O         Antibody (ASO), Comprehensive metabolic panel         (BMP + Alb, Alk Phos, ALT, AST, Total. Bili,         TP), predniSONE (DELTASONE) 20 MG tablet            Unclear etiology but concerning for vasculitis, possible reaction from recent COVID vaccination.  Reviewed initial labs which were reassuring, will follow up on CMP and ASO titer and notify if any  abnormalities.  RX prednisone burst given to help with possible vasculitis.    Follow up with primary provider in 1 week for recheck    Salty Watt MD  October 15, 2021 5:02 PM               PRINCIPAL DISCHARGE DIAGNOSIS  Diagnosis: Morbidly obese  Assessment and Plan of Treatment: Clear liqud diet for 2 days then fulls until follow up   Please follow up with Dr. Browne within 1 week   Please crush all meds

## 2021-10-15 NOTE — PATIENT INSTRUCTIONS
"Take prednisone burst    Patient Education     What is Vasculitis?  Vasculitis (vask-mohan-EYE-tis) is when blood vessels get inflamed. It happens when your immune system attacks your blood vessels by mistake.   Inflamed areas may hurt, swell up, and get warm and red. When blood vessels get inflamed, they can narrow or close off. This slows or stops blood from flowing. It can lead to serious problems, depending on where it is in your body.   There are many different kinds of vasculitis. Most of them are very rare. If you have vasculitis, it's important to get treated as early as you can. That's the best way to avoid lasting harm to your body.  What are the signs of vasculitis?  Signs vary, depending on which part of your body is affected. You may get symptoms all over your body like these:    Fever    Not wanting to eat (loss of appetite)    Weight loss    Tiredness (fatigue)    General aches and pains  If the problem affects your skin, you may get spots, blotches, or hives.   Inflamed blood vessels in the lungs may make you short of breath or cough up blood.   Vasculitis in your nose, throat, or ears can cause ear infections, sores in the nose, and even hearing loss.  How do doctors treat vasculitis?  Treatment depends on:    What type of vasculitis you have    Which organs are hurt    How severe, or bad, it is  Medicine  The main treatment for vasculitis is medicine. If your vasculitis is mild, over-the-counter drugs may help. These include aspirin, Tylenol (acetaminophen), Advil (ibuprofen) or Aleve (naproxen).  If your vasculitis is severe, your doctor may prescribe corticosteroids (RAYMONDserena-koh-SARIAH-robe). These are medicines that help with inflammation. You may know them as \"steroids.\" They lessen or stop your body's immune response.   Doctors may also use cytotoxic (SY-lasha-TOX-ik) medicines. These drugs may help if the problem is severe, or if steroids don't work well. Cytotoxic medicines kill the cells causing " the inflammation.   Other treatments may work for some kinds of vasculitis. For example, doctors treat Kawasaki disease with high-dose aspirin and immune globulin (QJJF-mbr-kdg). Immune globulin is a medicine that's injected into a vein.  Surgery  You may need surgery if blood vessels or organs are damaged. For example, you may need a bypass graft to a blood vessel. If you have a very badly damaged organ, you may need a transplant.  Types of Vasculitis  These are the types of vasculitis that doctors find most often. We grouped the different types by the blood vessels they affect.  Largest arteries: aorta and major branches    Aortitis (ay-ort-EYE-tis) in Cogan's syndrome    Aortitis in spondyloarthropathies (DISHN-lwb-mgc-wsk-RULMZ-qja-the)    Giant cell arteritis (ART-er-EYE-tis)    Isolated aortitis    Takayasu's arteritis  Medium-sized arteries    Kawasaki's disease    Polyarteritis nodosa (PAH-jan-art-er-EYE-tis no-CHRISTOS-giulia)  Small and medium-sized arteries    ANCA-associated vasculitis    Churg-Amy syndrome    Microscopic polyangiitis (hd-siwy-RVLZ-pik XNO-xbk-qak-kimber-EYE-tis)    Primary angiitis (an-kimber-EYE-tis) of the central nervous system    Wegener's granulomatosis (VEG-en-urz GRAN-fernández-[facility]-CHARISMA-sis)  Small arteries    Cryoglobulinemic (VDW-ye-kdkf-wwa-ewp-FB-elle) vasculitis    Drug-induced vasculitis    Goodpasture's syndrome    Henoch-Schönlein purpura (HEN-ahk-SHUN-kristel PER-pyeh-ruh)    Vasculitis with rheumatoid ([facility]) arthritis, Lupus or Sjögren's ([facility]) syndrome  Arteries and veins of different sizes    Behcet's disease    Relapsing polychondritis (YPL-ckk-tph-DRY-tis)  What causes vasculitis?  We don't know what causes the most common types. Sometimes the body reacts to medicine. Sometimes an infection can bring it on, like hepatitis B or C. Sometimes it is linked to other diseases, like lupus or rheumatoid arthritis.  Who gets vasculitis?  Some common types affect only certain  people. For example, only children get Kawasaki's disease. But only people over age 50 get giant cell arteritis. The rare forms can affect people of any age.  How do doctors diagnose vasculitis?  Your doctor may look for vasculitis if you have signs of organ damage for no reason. Sometimes the doctor will take a sample of tissue from your body, called a biopsy. Sometimes a blood test can point to the problem. Vasculitis in large blood vessels may show up in pictures of your internal organs.   For informational purposes only. Not to replace the advice of your health care provider.   Adapted from NIH documents at http://www.nhlbi.nih.gov/health/health-topics/topics/vas. Accessed 12/31/15. Published by [employer]. All rights reserved. [employer] [id number] - 07/16.  For informational purposes only. Not to replace the advice of your health care provider.  Copyright   2018 [employer]. All rights reserved.

## 2021-10-15 NOTE — TELEPHONE ENCOUNTER
Provider E-Visit time total (minutes):     It is difficult to evaluate the rash from the picture .    If it a new rash spreading I would strongly recommend to be seen in urgent care today  to make sure your platelet and blood count is okay .    Thanks   Demetrice Moseley MD

## 2021-10-15 NOTE — TELEPHONE ENCOUNTER
"Per e-visit 10/15/21: \"It is difficult to evaluate the rash from the picture .     If it a new rash spreading I would strongly recommend to be seen in urgent care today  to make sure your platelet and blood count is okay .     Thanks   Demetrice Moseley MD\"    [facility] should be seen today at  regarding rash from e-visit.     Ellis FOX RN        "

## 2021-10-16 LAB
ALBUMIN SERPL-MCNC: 3.3 G/DL (ref 3.4–5)
ALP SERPL-CCNC: 87 U/L (ref 40–150)
ALT SERPL W P-5'-P-CCNC: 84 U/L (ref 0–70)
ANION GAP SERPL CALCULATED.3IONS-SCNC: 6 MMOL/L (ref 3–14)
AST SERPL W P-5'-P-CCNC: 59 U/L (ref 0–45)
BILIRUB SERPL-MCNC: 0.9 MG/DL (ref 0.2–1.3)
BUN SERPL-MCNC: 15 MG/DL (ref 7–30)
CALCIUM SERPL-MCNC: 9.1 MG/DL (ref 8.5–10.1)
CHLORIDE BLD-SCNC: 104 MMOL/L (ref 94–109)
CO2 SERPL-SCNC: 27 MMOL/L (ref 20–32)
CREAT SERPL-MCNC: 0.71 MG/DL (ref 0.66–1.25)
GFR SERPL CREATININE-BSD FRML MDRD: >90 ML/MIN/1.73M2
GLUCOSE BLD-MCNC: 56 MG/DL (ref 70–99)
POTASSIUM BLD-SCNC: 4.3 MMOL/L (ref 3.4–5.3)
PROT SERPL-MCNC: 7.9 G/DL (ref 6.8–8.8)
SODIUM SERPL-SCNC: 137 MMOL/L (ref 133–144)

## 2021-10-17 LAB — ASO AB SERPL-ACNC: 162 IU/ML

## 2021-10-20 ENCOUNTER — OFFICE VISIT (OUTPATIENT)
Dept: FAMILY MEDICINE | Facility: CLINIC | Age: 51
End: 2021-10-20
Payer: COMMERCIAL

## 2021-10-20 VITALS
TEMPERATURE: 97.9 F | WEIGHT: 315 LBS | HEART RATE: 67 BPM | SYSTOLIC BLOOD PRESSURE: 155 MMHG | HEIGHT: 68 IN | BODY MASS INDEX: 47.74 KG/M2 | DIASTOLIC BLOOD PRESSURE: 81 MMHG | RESPIRATION RATE: 14 BRPM

## 2021-10-20 DIAGNOSIS — E16.2 LOW BLOOD GLUCOSE MEASUREMENT: ICD-10-CM

## 2021-10-20 DIAGNOSIS — I77.6 VASCULITIS (H): ICD-10-CM

## 2021-10-20 DIAGNOSIS — D72.829 LEUKOCYTOSIS, UNSPECIFIED TYPE: ICD-10-CM

## 2021-10-20 DIAGNOSIS — R35.0 URINARY FREQUENCY: ICD-10-CM

## 2021-10-20 LAB
ALBUMIN UR-MCNC: NEGATIVE MG/DL
APPEARANCE UR: CLEAR
BASOPHILS # BLD AUTO: 0.1 10E3/UL (ref 0–0.2)
BASOPHILS NFR BLD AUTO: 1 %
BILIRUB UR QL STRIP: NEGATIVE
COLOR UR AUTO: YELLOW
EOSINOPHIL # BLD AUTO: 0.2 10E3/UL (ref 0–0.7)
EOSINOPHIL NFR BLD AUTO: 1 %
ERYTHROCYTE [DISTWIDTH] IN BLOOD BY AUTOMATED COUNT: 13.4 % (ref 10–15)
ERYTHROCYTE [SEDIMENTATION RATE] IN BLOOD BY WESTERGREN METHOD: 13 MM/HR (ref 0–20)
GLUCOSE UR STRIP-MCNC: NEGATIVE MG/DL
HCT VFR BLD AUTO: 43.6 % (ref 40–53)
HGB BLD-MCNC: 14.7 G/DL (ref 13.3–17.7)
HGB UR QL STRIP: NEGATIVE
KETONES UR STRIP-MCNC: ABNORMAL MG/DL
LEUKOCYTE ESTERASE UR QL STRIP: NEGATIVE
LYMPHOCYTES # BLD AUTO: 4.2 10E3/UL (ref 0.8–5.3)
LYMPHOCYTES NFR BLD AUTO: 28 %
MCH RBC QN AUTO: 30.4 PG (ref 26.5–33)
MCHC RBC AUTO-ENTMCNC: 33.7 G/DL (ref 31.5–36.5)
MCV RBC AUTO: 90 FL (ref 78–100)
MONOCYTES # BLD AUTO: 1 10E3/UL (ref 0–1.3)
MONOCYTES NFR BLD AUTO: 7 %
NEUTROPHILS # BLD AUTO: 9.7 10E3/UL (ref 1.6–8.3)
NEUTROPHILS NFR BLD AUTO: 64 %
NITRATE UR QL: NEGATIVE
PH UR STRIP: 5.5 [PH] (ref 5–7)
PLATELET # BLD AUTO: 360 10E3/UL (ref 150–450)
RBC # BLD AUTO: 4.83 10E6/UL (ref 4.4–5.9)
SP GR UR STRIP: >=1.03 (ref 1–1.03)
UROBILINOGEN UR STRIP-ACNC: 0.2 E.U./DL
WBC # BLD AUTO: 15.2 10E3/UL (ref 4–11)

## 2021-10-20 PROCEDURE — 80053 COMPREHEN METABOLIC PANEL: CPT | Performed by: FAMILY MEDICINE

## 2021-10-20 PROCEDURE — 81003 URINALYSIS AUTO W/O SCOPE: CPT | Performed by: FAMILY MEDICINE

## 2021-10-20 PROCEDURE — 36415 COLL VENOUS BLD VENIPUNCTURE: CPT | Performed by: FAMILY MEDICINE

## 2021-10-20 PROCEDURE — 85610 PROTHROMBIN TIME: CPT | Performed by: FAMILY MEDICINE

## 2021-10-20 PROCEDURE — 85025 COMPLETE CBC W/AUTO DIFF WBC: CPT | Performed by: FAMILY MEDICINE

## 2021-10-20 PROCEDURE — 99214 OFFICE O/P EST MOD 30 MIN: CPT | Performed by: FAMILY MEDICINE

## 2021-10-20 PROCEDURE — 85652 RBC SED RATE AUTOMATED: CPT | Performed by: FAMILY MEDICINE

## 2021-10-20 PROCEDURE — 86140 C-REACTIVE PROTEIN: CPT | Performed by: FAMILY MEDICINE

## 2021-10-20 ASSESSMENT — MIFFLIN-ST. JEOR: SCORE: 2262.87

## 2021-10-20 NOTE — PROGRESS NOTES
Assessment & Plan   See after visit summary and result note from studies for helpful information and advice given to patient.    Vasculitis (H)  Patient is agreeable to my suggestion that he be seen in ER for further evaluation. Evaluation should include blood cultures.   - Comprehensive metabolic panel (BMP + Alb, Alk Phos, ALT, AST, Total. Bili, TP)  - ESR: Erythrocyte sedimentation rate  - CRP, inflammation  - CBC with platelets and differential    Leukocytosis, unspecified type      Urinary frequency    - UA Macro with Reflex to Micro and Culture - lab collect  - UA Macro with Reflex to Micro and Culture - lab collect    Low blood glucose measurement    - INR        30 minutes spent on the date of the encounter doing chart review, history and exam, documentation and further activities per the note           Return in about 2 weeks (around 11/3/2021), or if symptoms worsen or fail to improve.    Yuval Evans DO  [facility] ALFONSO Wetzel is a 51 year old who presents for the following health issues     History of Present Illness       He eats 2-3 servings of fruits and vegetables daily.He consumes 3 sweetened beverage(s) daily.He exercises with enough effort to increase his heart rate 9 or less minutes per day.  He exercises with enough effort to increase his heart rate 3 or less days per week.   He is taking medications regularly.       ED/UC Followup:    Facility:  [facility]  Date of visit: 10-15  Reason for visit: Rash  Current Status: not much change after prednisone            Review of Systems   Check in questions and answers reviewed. Patient is seen for chief concern of spreading vasculitis type rash.    Since being seen in urgent care, patient has not noted any improvement in his rash with taking of oral steroid.    Patient has had increased urinary urgency while taking oral prednisone last night, and previous 2 days. No dysuria. His urine seemed more clear.     His  "vasculitis rash has spread significantly since recent urgent care visit, with the newer areas in itching and burning slightly.     No respiratory complaints. No reported fever or chills.      Objective    BP (!) 155/81 (BP Location: Right arm, Patient Position: Chair, Cuff Size: Adult Large)   Pulse 67   Temp 97.9  F (36.6  C) (Oral)   Resp 14   Ht 1.727 m (5' 8\")   Wt 143.3 kg (316 lb)   BMI 48.05 kg/m    Body mass index is 48.05 kg/m .  Physical Exam   Vital signs reviewed.  Patient is in no acute appearing distress.  Breathing appears nonlabored.  Patient is alert and oriented ×3.  Patient is very pleasant, making good eye contact and responding with clear fluent speech.    Heart: Heart rate is regular without murmur.    Lungs: Lungs are clear to auscultation with good airflow bilaterally.    Skin/extremities: Warm and dry, with slight bilateral lower leg edema. See pictures for further details.              Results for orders placed or performed in visit on 10/20/21   ESR: Erythrocyte sedimentation rate     Status: Normal   Result Value Ref Range    Erythrocyte Sedimentation Rate 13 0 - 20 mm/hr   INR     Status: Normal   Result Value Ref Range    INR 1.00 0.85 - 1.15   UA Macro with Reflex to Micro and Culture - lab collect     Status: Abnormal    Specimen: Urine, Midstream   Result Value Ref Range    Color Urine Yellow Colorless, Straw, Light Yellow, Yellow    Appearance Urine Clear Clear    Glucose Urine Negative Negative mg/dL    Bilirubin Urine Negative Negative    Ketones Urine Trace (A) Negative mg/dL    Specific Gravity Urine >=1.030 1.003 - 1.035    Blood Urine Negative Negative    pH Urine 5.5 5.0 - 7.0    Protein Albumin Urine Negative Negative mg/dL    Urobilinogen Urine 0.2 0.2, 1.0 E.U./dL    Nitrite Urine Negative Negative    Leukocyte Esterase Urine Negative Negative    Narrative    Microscopic not indicated   CBC with platelets and differential     Status: Abnormal   Result Value Ref Range "    WBC Count 15.2 (H) 4.0 - 11.0 10e3/uL    RBC Count 4.83 4.40 - 5.90 10e6/uL    Hemoglobin 14.7 13.3 - 17.7 g/dL    Hematocrit 43.6 40.0 - 53.0 %    MCV 90 78 - 100 fL    MCH 30.4 26.5 - 33.0 pg    MCHC 33.7 31.5 - 36.5 g/dL    RDW 13.4 10.0 - 15.0 %    Platelet Count 360 150 - 450 10e3/uL    % Neutrophils 64 %    % Lymphocytes 28 %    % Monocytes 7 %    % Eosinophils 1 %    % Basophils 1 %    Absolute Neutrophils 9.7 (H) 1.6 - 8.3 10e3/uL    Absolute Lymphocytes 4.2 0.8 - 5.3 10e3/uL    Absolute Monocytes 1.0 0.0 - 1.3 10e3/uL    Absolute Eosinophils 0.2 0.0 - 0.7 10e3/uL    Absolute Basophils 0.1 0.0 - 0.2 10e3/uL   CBC with platelets and differential     Status: Abnormal    Narrative    The following orders were created for panel order CBC with platelets and differential.  Procedure                               Abnormality         Status                     ---------                               -----------         ------                     CBC with platelets and d...[[id number]]  Abnormal            Final result                 Please view results for these tests on the individual orders.

## 2021-10-20 NOTE — PATIENT INSTRUCTIONS
"Please contact me if you are not trending better within a week. I will review your studies via [employer].   Patient Education     What is Vasculitis?  Vasculitis (vask-mohan-EYE-tis) is when blood vessels get inflamed. It happens when your immune system attacks your blood vessels by mistake.   Inflamed areas may hurt, swell up, and get warm and red. When blood vessels get inflamed, they can narrow or close off. This slows or stops blood from flowing. It can lead to serious problems, depending on where it is in your body.   There are many different kinds of vasculitis. Most of them are very rare. If you have vasculitis, it's important to get treated as early as you can. That's the best way to avoid lasting harm to your body.  What are the signs of vasculitis?  Signs vary, depending on which part of your body is affected. You may get symptoms all over your body like these:    Fever    Not wanting to eat (loss of appetite)    Weight loss    Tiredness (fatigue)    General aches and pains  If the problem affects your skin, you may get spots, blotches, or hives.   Inflamed blood vessels in the lungs may make you short of breath or cough up blood.   Vasculitis in your nose, throat, or ears can cause ear infections, sores in the nose, and even hearing loss.  How do doctors treat vasculitis?  Treatment depends on:    What type of vasculitis you have    Which organs are hurt    How severe, or bad, it is  Medicine  The main treatment for vasculitis is medicine. If your vasculitis is mild, over-the-counter drugs may help. These include aspirin, Tylenol (acetaminophen), Advil (ibuprofen) or Aleve (naproxen).  If your vasculitis is severe, your doctor may prescribe corticosteroids (RAYMOND-tuh-koh-STAIR-oydz). These are medicines that help with inflammation. You may know them as \"steroids.\" They lessen or stop your body's immune response.   Doctors may also use cytotoxic (SY-lasha-TOX-ik) medicines. These drugs may help if the problem is " severe, or if steroids don't work well. Cytotoxic medicines kill the cells causing the inflammation.   Other treatments may work for some kinds of vasculitis. For example, doctors treat Kawasaki disease with high-dose aspirin and immune globulin (Kaite). Immune globulin is a medicine that's injected into a vein.  Surgery  You may need surgery if blood vessels or organs are damaged. For example, you may need a bypass graft to a blood vessel. If you have a very badly damaged organ, you may need a transplant.  Types of Vasculitis  These are the types of vasculitis that doctors find most often. We grouped the different types by the blood vessels they affect.  Largest arteries: aorta and major branches    Aortitis (ay-ort-EYE-tis) in Cogan's syndrome    Aortitis in spondyloarthropathies (ZJSAN-opr-zvr-hdc-MDNOI-ubu-the)    Giant cell arteritis (ART-er-EYE-tis)    Isolated aortitis    Takayasu's arteritis  Medium-sized arteries    Kawasaki's disease    Polyarteritis nodosa (PAH-jan-art-er-EYE-tis noh-CHRISTOS-giulia)  Small and medium-sized arteries    ANCA-associated vasculitis    Churg-Amy syndrome    Microscopic polyangiitis (hu-zevx-HLLP-pik MOC-scl-scb-kimber-EYE-tis)    Primary angiitis (an-kimber-EYE-tis) of the central nervous system    Wegener's granulomatosis (VEG-en-urz GRAN-fernández-jeni-mu-CHARISMA-sis)  Small arteries    Cryoglobulinemic (ZFQ-uh-dael-uot-oju-TQ-elle) vasculitis    Drug-induced vasculitis    Goodpasture's syndrome    Henoch-Schönlein purpura (HEN-ahk-SHUN-kristel PER-[facility])    Vasculitis with rheumatoid ([facility]) arthritis, Lupus or Sjögren's ([facility]) syndrome  Arteries and veins of different sizes    Behcet's disease    Relapsing polychondritis (UYK-wsc-fog-DRY-tis)  What causes vasculitis?  We don't know what causes the most common types. Sometimes the body reacts to medicine. Sometimes an infection can bring it on, like hepatitis B or C. Sometimes it is linked to other diseases, like lupus or  rheumatoid arthritis.  Who gets vasculitis?  Some common types affect only certain people. For example, only children get Kawasaki's disease. But only people over age 50 get giant cell arteritis. The rare forms can affect people of any age.  How do doctors diagnose vasculitis?  Your doctor may look for vasculitis if you have signs of organ damage for no reason. Sometimes the doctor will take a sample of tissue from your body, called a biopsy. Sometimes a blood test can point to the problem. Vasculitis in large blood vessels may show up in pictures of your internal organs.   For informational purposes only. Not to replace the advice of your health care provider.   Adapted from NIH documents at http://www.nhlbi.nih.gov/health/health-topics/topics/vas. Accessed 12/31/15. Published by [employer]. All rights reserved. [facility] [id number] - 07/16.  For informational purposes only. Not to replace the advice of your health care provider.  Copyright   2018 [employer]. All rights reserved.

## 2021-10-21 ENCOUNTER — HOSPITAL ENCOUNTER (EMERGENCY)
Facility: CLINIC | Age: 51
Discharge: HOME | End: 2021-10-21
Attending: EMERGENCY MEDICINE | Admitting: EMERGENCY MEDICINE
Payer: COMMERCIAL

## 2021-10-21 VITALS
RESPIRATION RATE: 16 BRPM | DIASTOLIC BLOOD PRESSURE: 98 MMHG | OXYGEN SATURATION: 100 % | HEART RATE: 63 BPM | SYSTOLIC BLOOD PRESSURE: 143 MMHG | TEMPERATURE: 97.6 F

## 2021-10-21 DIAGNOSIS — R23.3 PETECHIAE: ICD-10-CM

## 2021-10-21 LAB
ALBUMIN SERPL-MCNC: 3.2 G/DL (ref 3.4–5)
ALBUMIN UR-MCNC: NEGATIVE MG/DL
ALP SERPL-CCNC: 79 U/L (ref 40–150)
ALT SERPL W P-5'-P-CCNC: 74 U/L (ref 0–70)
ANION GAP SERPL CALCULATED.3IONS-SCNC: 7 MMOL/L (ref 3–14)
APPEARANCE UR: CLEAR
AST SERPL W P-5'-P-CCNC: 41 U/L (ref 0–45)
BASOPHILS # BLD AUTO: 0.1 10E3/UL (ref 0–0.2)
BASOPHILS NFR BLD AUTO: 1 %
BILIRUB SERPL-MCNC: 1.1 MG/DL (ref 0.2–1.3)
BILIRUB UR QL STRIP: NEGATIVE
BUN SERPL-MCNC: 13 MG/DL (ref 7–30)
CALCIUM SERPL-MCNC: 8.5 MG/DL (ref 8.5–10.1)
CHLORIDE BLD-SCNC: 103 MMOL/L (ref 94–109)
CO2 SERPL-SCNC: 29 MMOL/L (ref 20–32)
COLOR UR AUTO: NORMAL
CREAT SERPL-MCNC: 0.7 MG/DL (ref 0.66–1.25)
CRP SERPL-MCNC: 11.6 MG/L (ref 0–8)
CRP SERPL-MCNC: 6.6 MG/L (ref 0–8)
EOSINOPHIL # BLD AUTO: 0.2 10E3/UL (ref 0–0.7)
EOSINOPHIL NFR BLD AUTO: 2 %
ERYTHROCYTE [DISTWIDTH] IN BLOOD BY AUTOMATED COUNT: 13.2 % (ref 10–15)
ERYTHROCYTE [SEDIMENTATION RATE] IN BLOOD BY WESTERGREN METHOD: 13 MM/HR (ref 0–20)
GFR SERPL CREATININE-BSD FRML MDRD: >90 ML/MIN/1.73M2
GLUCOSE BLD-MCNC: 87 MG/DL (ref 70–99)
GLUCOSE UR STRIP-MCNC: NEGATIVE MG/DL
HCT VFR BLD AUTO: 44.6 % (ref 40–53)
HGB BLD-MCNC: 14.8 G/DL (ref 13.3–17.7)
HGB UR QL STRIP: NEGATIVE
HOLD SPECIMEN: NORMAL
IMM GRANULOCYTES # BLD: 0 10E3/UL
IMM GRANULOCYTES NFR BLD: 0 %
INR PPP: 1 (ref 0.85–1.15)
KETONES UR STRIP-MCNC: NEGATIVE MG/DL
LEUKOCYTE ESTERASE UR QL STRIP: NEGATIVE
LYMPHOCYTES # BLD AUTO: 2.4 10E3/UL (ref 0.8–5.3)
LYMPHOCYTES NFR BLD AUTO: 24 %
MCH RBC QN AUTO: 30.8 PG (ref 26.5–33)
MCHC RBC AUTO-ENTMCNC: 33.2 G/DL (ref 31.5–36.5)
MCV RBC AUTO: 93 FL (ref 78–100)
MONOCYTES # BLD AUTO: 0.6 10E3/UL (ref 0–1.3)
MONOCYTES NFR BLD AUTO: 6 %
NEUTROPHILS # BLD AUTO: 6.9 10E3/UL (ref 1.6–8.3)
NEUTROPHILS NFR BLD AUTO: 67 %
NITRATE UR QL: NEGATIVE
NRBC # BLD AUTO: 0 10E3/UL
NRBC BLD AUTO-RTO: 0 /100
PH UR STRIP: 6 [PH] (ref 5–7)
PLATELET # BLD AUTO: 297 10E3/UL (ref 150–450)
POTASSIUM BLD-SCNC: 3.8 MMOL/L (ref 3.4–5.3)
PROT SERPL-MCNC: 7.2 G/DL (ref 6.8–8.8)
RBC # BLD AUTO: 4.8 10E6/UL (ref 4.4–5.9)
RBC URINE: 1 /HPF
SODIUM SERPL-SCNC: 139 MMOL/L (ref 133–144)
SP GR UR STRIP: 1.02 (ref 1–1.03)
SQUAMOUS EPITHELIAL: <1 /HPF
UROBILINOGEN UR STRIP-MCNC: NORMAL MG/DL
WBC # BLD AUTO: 10.2 10E3/UL (ref 4–11)
WBC URINE: 1 /HPF

## 2021-10-21 PROCEDURE — 36415 COLL VENOUS BLD VENIPUNCTURE: CPT | Performed by: EMERGENCY MEDICINE

## 2021-10-21 PROCEDURE — 81001 URINALYSIS AUTO W/SCOPE: CPT | Performed by: EMERGENCY MEDICINE

## 2021-10-21 PROCEDURE — 80053 COMPREHEN METABOLIC PANEL: CPT | Performed by: EMERGENCY MEDICINE

## 2021-10-21 PROCEDURE — 99283 EMERGENCY DEPT VISIT LOW MDM: CPT

## 2021-10-21 PROCEDURE — 87040 BLOOD CULTURE FOR BACTERIA: CPT | Performed by: EMERGENCY MEDICINE

## 2021-10-21 PROCEDURE — 85004 AUTOMATED DIFF WBC COUNT: CPT | Performed by: EMERGENCY MEDICINE

## 2021-10-21 PROCEDURE — 85652 RBC SED RATE AUTOMATED: CPT | Performed by: EMERGENCY MEDICINE

## 2021-10-21 PROCEDURE — 86140 C-REACTIVE PROTEIN: CPT | Performed by: EMERGENCY MEDICINE

## 2021-10-21 ASSESSMENT — ENCOUNTER SYMPTOMS
PALPITATIONS: 0
FEVER: 0
SHORTNESS OF BREATH: 0
CONFUSION: 0

## 2021-10-21 NOTE — ED PROVIDER NOTES
History   Chief Complaint:  Rash       The history is provided by the patient.      Mike Schafer is a 51 year old male with history of hypertension who presents with rash. Two weeks ago the patient noticed a rash on his right foot. He thought he got bit by an insect or that it was acne but then it progressively spread. He now has rash on his legs, abdomen, and arms. When he exerts himself the rash gets more red. The rash is not very itchy. Rash started one week after his second Covid shot. No palpitations, chest pain, or shortness of breath. No fever. Not a daily smoker. Notes occasional marijuana use. He has been evaluated by his primary and urgent care. No rash around the time of surgery one year ago.     Review of Systems   Constitutional: Negative for fever.   Respiratory: Negative for shortness of breath.    Cardiovascular: Negative for chest pain and palpitations.   Skin: Positive for rash.   Psychiatric/Behavioral: Negative for confusion.   All other systems reviewed and are negative.    Allergies:  The patient has no known allergies.     Medications:  Proair hfa  Airduo respiclick  Toprol xl    Past Medical History:     Hypertension  Morbid obesity  Obstructive sleep apnea    Rupture of left distal biceps tendon    Past Surgical History:    Soft tissue surgery     Family History:    Mother: breast cancer  Father: diabetes    Social History:  Presents to ED with visitor    Physical Exam     Patient Vitals for the past 24 hrs:   BP Temp Temp src Pulse Resp SpO2   10/21/21 1702 (!) 143/98 97.6  F (36.4  C) Oral 63 16 100 %   10/21/21 1658 -- -- -- -- -- 100 %   10/21/21 1657 -- -- -- -- -- 100 %   10/21/21 1656 (!) 143/98 -- -- 63 -- --   10/21/21 1614 -- -- -- -- -- 99 %   10/21/21 1613 -- -- -- -- -- 97 %   10/21/21 1612 -- -- -- -- -- 100 %   10/21/21 1611 135/81 -- -- 59 -- --   10/21/21 1318 (!) 177/99 97.6  F (36.4  C) Temporal 65 16 100 %       Physical Exam  Constitutional: Vital signs reviewed  as above.   Head: No external signs of trauma noted.  Eyes: Pupils are equal, round, and reactive to light.   Neck: No JVD noted  Cardiovascular: Normal rate, regular rhythm and normal heart sounds.  No murmur heard. Equal B/L peripheral pulses.  Pulmonary/Chest: Effort normal and breath sounds normal. No respiratory distress. Patient has no wheezes. Patient has no rales.   Gastrointestinal: Soft. There is no tenderness.   Musculoskeletal/Extremities: No edema noted. Normal tone.  Neurological: Patient is alert and oriented to person, place, and time.   Skin: Petechial rash as below with some purpuric areas.  Psychiatric: The patient appears calm.                Emergency Department Course     Laboratory:  Labs Ordered and Resulted from Time of ED Arrival Up to the Time of Departure from the ED   COMPREHENSIVE METABOLIC PANEL - Abnormal; Notable for the following components:       Result Value    ALT 74 (*)     Albumin 3.2 (*)     All other components within normal limits   CRP INFLAMMATION - Abnormal; Notable for the following components:    CRP Inflammation 11.6 (*)     All other components within normal limits   ROUTINE UA WITH MICROSCOPIC REFLEX TO CULTURE - Normal    Narrative:     Urine Culture not indicated   CBC WITH PLATELETS AND DIFFERENTIAL   EXTRA BLUE TOP TUBE   EXTRA RED TOP TUBE   EXTRA GREEN TOP (LITHIUM HEPARIN) TUBE   EXTRA PURPLE TOP TUBE   PERIPHERAL IV CATHETER   BLOOD CULTURE   BLOOD CULTURE   CBC WITH PLATELETS & DIFFERENTIAL    Narrative:     The following orders were created for panel order CBC with platelets differential.  Procedure                               Abnormality         Status                     ---------                               -----------         ------                     CBC with platelets and d...[[id number]]                      Final result                 Please view results for these tests on the individual orders.   EXTRA TUBE    Narrative:     The following  orders were created for panel order [place] Draw.  Procedure                               Abnormality         Status                     ---------                               -----------         ------                     Extra Blue Top Tube[[id number]]                              Final result               Extra Red Top Tube[[id number]]                               Final result               Extra Green Top (Lithium...[[id number]]                      Final result               Extra Purple Top Tube[[id number]]                            Final result                 Please view results for these tests on the individual orders.      Blood culture peripheral blood x2: Pending    Emergency Department Course:  Reviewed:  I reviewed nursing notes, vitals, past medical history and Care Everywhere    Assessments/Consults:  ED Course as of Oct 21 1846   Thu Oct 21, 2021   1435 Obtained history and examined the patient as noted above.       1550 Rechecked and updated the patient.       1634 Rechecked and updated the patient.       1650 Updated patient on CRP result (elevated compared to 10/20). ESR pending.      1735 Called patient post discharge and updated him on the ESR result.        Disposition:  The patient was discharged to home.     Impression & Plan     CMS Diagnoses: None    Medical Decision Making:  This 51-year-old male patient presents to the ED due to a rash.  Please see the HPI and exam for specifics.  The patient remained well in the ED.  He was asymptomatic.  Blood work was obtained with results as above.  I do not see a cytopenia.  His sed rate is the same as it was yesterday and slightly decreased from the 15th of this month.  I do note that his white blood cell count has improved but his CRP has elevated from yesterday.  Given the patient's nonspecific findings, and his normal physical exam, along with the fact that he feels well, I think he is safe for discharge.  I think that outpatient primary care  follow-up is reasonable and he may return at any point with new or worsening symptoms.  Anticipatory guidance given prior to discharge.    Diagnosis:    ICD-10-CM    1. Petechiae  R23.3        Scribe Disclosure:  I, Lexx Aguiar, am serving as a scribe at 2:28 PM on 10/21/2021 to document services personally performed by Kenny Dorantes DO based on my observations and the provider's statements to me.            Kenny Dorantes DO  10/21/21 1848

## 2021-10-21 NOTE — DISCHARGE INSTRUCTIONS
Diagnosis: Petechiae.  Petechiae are pinpoint, round spots that appear on the skin as a result of bleeding. The bleeding causes the petechiae to appear red, brown or purple. Petechiae (yjf-WFU-spj-ee) commonly appear in clusters and may look like a rash. Usually flat to the touch, petechiae don't lose color when you press on them. Sometimes they appear on the inner surfaces of the mouth or the eyelids.Petechiae are common and may indicate a number of conditions, ranging from minor to very serious.  What do you do next:   Continue your home medications unless we have specifically changed them  Follow up as indicated below    When do you return: If you have uncontrolled pain, uncontrolled fevers, confusion, yellowing of the skin of the eyes, severe abdominal pain, or any other symptoms that concern you, please return to the ED for reevaluation.    Thank you for allowing us to care for you today.

## 2021-10-22 LAB
ALBUMIN SERPL-MCNC: 3.5 G/DL (ref 3.4–5)
ALP SERPL-CCNC: 83 U/L (ref 40–150)
ALT SERPL W P-5'-P-CCNC: 73 U/L (ref 0–70)
ANION GAP SERPL CALCULATED.3IONS-SCNC: 7 MMOL/L (ref 3–14)
AST SERPL W P-5'-P-CCNC: 38 U/L (ref 0–45)
BILIRUB SERPL-MCNC: 0.8 MG/DL (ref 0.2–1.3)
BUN SERPL-MCNC: 16 MG/DL (ref 7–30)
CALCIUM SERPL-MCNC: 8.6 MG/DL (ref 8.5–10.1)
CHLORIDE BLD-SCNC: 105 MMOL/L (ref 94–109)
CO2 SERPL-SCNC: 26 MMOL/L (ref 20–32)
CREAT SERPL-MCNC: 0.81 MG/DL (ref 0.66–1.25)
GFR SERPL CREATININE-BSD FRML MDRD: >90 ML/MIN/1.73M2
GLUCOSE BLD-MCNC: 89 MG/DL (ref 70–99)
POTASSIUM BLD-SCNC: 3.9 MMOL/L (ref 3.4–5.3)
PROT SERPL-MCNC: 7.9 G/DL (ref 6.8–8.8)
SODIUM SERPL-SCNC: 138 MMOL/L (ref 133–144)

## 2021-10-26 LAB
BACTERIA BLD CULT: NO GROWTH
BACTERIA BLD CULT: NO GROWTH

## 2021-11-01 ENCOUNTER — MYC MEDICAL ADVICE (OUTPATIENT)
Dept: FAMILY MEDICINE | Facility: CLINIC | Age: 51
End: 2021-11-01

## 2021-11-01 DIAGNOSIS — R79.82 ELEVATED C-REACTIVE PROTEIN (CRP): ICD-10-CM

## 2021-11-01 DIAGNOSIS — I77.6 VASCULITIS (H): Primary | ICD-10-CM

## 2021-11-04 ENCOUNTER — TELEPHONE (OUTPATIENT)
Dept: DERMATOLOGY | Facility: CLINIC | Age: 51
End: 2021-11-04

## 2021-11-04 NOTE — TELEPHONE ENCOUNTER
[facility] Call Center    Phone Message    May a detailed message be left on voicemail: yes     Reason for Call: Appointment Intake    Referring Provider Name: Yuval Evans DO in  FAMILY PRACTICE    Diagnosis and/or Symptoms: Vasculitis; Elevated C-reactive protein    Pt called to schedule an appointment for an urgent referral in the next 3-5 days, Next available with any provider is 1/31/22. Please call Pt at [phone] to further discuss. Thank you.    Action Taken: Message routed to:  Other: [facility]    Travel Screening: Not Applicable

## 2021-11-05 ENCOUNTER — OFFICE VISIT (OUTPATIENT)
Dept: DERMATOLOGY | Facility: CLINIC | Age: 51
End: 2021-11-05
Payer: COMMERCIAL

## 2021-11-05 VITALS — HEART RATE: 56 BPM | DIASTOLIC BLOOD PRESSURE: 77 MMHG | SYSTOLIC BLOOD PRESSURE: 136 MMHG | OXYGEN SATURATION: 96 %

## 2021-11-05 DIAGNOSIS — D48.5 NEOPLASM OF UNCERTAIN BEHAVIOR OF SKIN: ICD-10-CM

## 2021-11-05 DIAGNOSIS — I77.6 VASCULITIS (H): Primary | ICD-10-CM

## 2021-11-05 LAB
ALBUMIN SERPL-MCNC: 3.6 G/DL (ref 3.4–5)
ALBUMIN UR-MCNC: NEGATIVE MG/DL
ALP SERPL-CCNC: 83 U/L (ref 40–150)
ALT SERPL W P-5'-P-CCNC: 72 U/L (ref 0–70)
ANION GAP SERPL CALCULATED.3IONS-SCNC: 5 MMOL/L (ref 3–14)
APPEARANCE UR: CLEAR
AST SERPL W P-5'-P-CCNC: 44 U/L (ref 0–45)
BACTERIA #/AREA URNS HPF: ABNORMAL /HPF
BILIRUB SERPL-MCNC: 1.2 MG/DL (ref 0.2–1.3)
BILIRUB UR QL STRIP: NEGATIVE
BUN SERPL-MCNC: 14 MG/DL (ref 7–30)
CALCIUM SERPL-MCNC: 8.9 MG/DL (ref 8.5–10.1)
CHLORIDE BLD-SCNC: 106 MMOL/L (ref 94–109)
CO2 SERPL-SCNC: 25 MMOL/L (ref 20–32)
COLOR UR AUTO: YELLOW
CREAT SERPL-MCNC: 0.76 MG/DL (ref 0.66–1.25)
ERYTHROCYTE [DISTWIDTH] IN BLOOD BY AUTOMATED COUNT: 13.3 % (ref 10–15)
GFR SERPL CREATININE-BSD FRML MDRD: >90 ML/MIN/1.73M2
GLUCOSE BLD-MCNC: 88 MG/DL (ref 70–99)
GLUCOSE UR STRIP-MCNC: NEGATIVE MG/DL
HCT VFR BLD AUTO: 44.8 % (ref 40–53)
HGB BLD-MCNC: 14.6 G/DL (ref 13.3–17.7)
HGB UR QL STRIP: NEGATIVE
KETONES UR STRIP-MCNC: NEGATIVE MG/DL
LEUKOCYTE ESTERASE UR QL STRIP: NEGATIVE
MCH RBC QN AUTO: 30 PG (ref 26.5–33)
MCHC RBC AUTO-ENTMCNC: 32.6 G/DL (ref 31.5–36.5)
MCV RBC AUTO: 92 FL (ref 78–100)
NITRATE UR QL: NEGATIVE
PH UR STRIP: 6 [PH] (ref 5–7)
PLATELET # BLD AUTO: 259 10E3/UL (ref 150–450)
POTASSIUM BLD-SCNC: 4.1 MMOL/L (ref 3.4–5.3)
PROT SERPL-MCNC: 8 G/DL (ref 6.8–8.8)
RBC # BLD AUTO: 4.86 10E6/UL (ref 4.4–5.9)
RBC #/AREA URNS AUTO: ABNORMAL /HPF
SODIUM SERPL-SCNC: 136 MMOL/L (ref 133–144)
SP GR UR STRIP: >=1.03 (ref 1–1.03)
SQUAMOUS #/AREA URNS AUTO: ABNORMAL /LPF
UROBILINOGEN UR STRIP-ACNC: 0.2 E.U./DL
WBC # BLD AUTO: 8.6 10E3/UL (ref 4–11)
WBC #/AREA URNS AUTO: ABNORMAL /HPF

## 2021-11-05 PROCEDURE — 85027 COMPLETE CBC AUTOMATED: CPT | Performed by: PHYSICIAN ASSISTANT

## 2021-11-05 PROCEDURE — 11102 TANGNTL BX SKIN SINGLE LES: CPT | Performed by: PHYSICIAN ASSISTANT

## 2021-11-05 PROCEDURE — 88350 IMFLUOR EA ADDL 1ANTB STN PX: CPT | Performed by: DERMATOLOGY

## 2021-11-05 PROCEDURE — 36415 COLL VENOUS BLD VENIPUNCTURE: CPT | Performed by: PHYSICIAN ASSISTANT

## 2021-11-05 PROCEDURE — 80053 COMPREHEN METABOLIC PANEL: CPT | Performed by: PHYSICIAN ASSISTANT

## 2021-11-05 PROCEDURE — 88346 IMFLUOR 1ST 1ANTB STAIN PX: CPT | Performed by: DERMATOLOGY

## 2021-11-05 PROCEDURE — 99214 OFFICE O/P EST MOD 30 MIN: CPT | Mod: 25 | Performed by: PHYSICIAN ASSISTANT

## 2021-11-05 PROCEDURE — 88305 TISSUE EXAM BY PATHOLOGIST: CPT | Performed by: DERMATOLOGY

## 2021-11-05 PROCEDURE — 81001 URINALYSIS AUTO W/SCOPE: CPT | Performed by: PHYSICIAN ASSISTANT

## 2021-11-05 PROCEDURE — 86235 NUCLEAR ANTIGEN ANTIBODY: CPT | Performed by: PHYSICIAN ASSISTANT

## 2021-11-05 PROCEDURE — 86038 ANTINUCLEAR ANTIBODIES: CPT | Performed by: PHYSICIAN ASSISTANT

## 2021-11-05 NOTE — PATIENT INSTRUCTIONS
Wound Care Instructions     FOR SUPERFICIAL WOUNDS     [facility] [phone]                 AFTER 24 HOURS YOU SHOULD REMOVE THE BANDAGE AND BEGIN DAILY DRESSING CHANGES AS FOLLOWS:     1) Remove Dressing.     2) Clean and dry the area with tap water using a Q-tip or sterile gauze pad.     3) Apply Vaseline, Aquaphor, Polysporin ointment or Bacitracin ointment over entire wound.  Do NOT use Neosporin ointment.     4) Cover the wound with a band-aid, or a sterile non-stick gauze pad and micropore paper tape    REPEAT THESE INSTRUCTIONS AT LEAST ONCE A DAY UNTIL THE WOUND HAS COMPLETELY HEALED.    It is an old wives tale that a wound heals better when it is exposed to air and allowed to dry out. The wound will heal faster with a better cosmetic result if it is kept moist with ointment and covered with a bandage.    **Do not let the wound dry out.**    Supplies Needed:      *Cotton tipped applicators (Q-tips)    *Vaseline, Aquaphor, Polysporin or Bacitracin Ointment (NOT NEOSPORIN)    *Band-aids or non-stick gauze pads and micropore paper tape.      PATIENT INFORMATION:    During the healing process you will notice a number of changes. All wounds develop a small halo of redness surrounding the wound.  This means healing is occurring. Severe itching with extensive redness usually indicates sensitivity to the ointment or bandage tape used to dress the wound.  You should call our office if this develops.      Swelling  and/or discoloration around your surgical site is common, particularly when performed around the eye.    All wounds normally drain.  The larger the wound the more drainage there will be.  After 7-10 days, you will notice the wound beginning to shrink and new skin will begin to grow.  The wound is healed when you can see skin has formed over the entire area.  A healed wound has a healthy, shiny look to the surface and is red to dark pink in color to normalize.  Wounds may take approximately  4-6 weeks to heal.  Larger wounds may take 6-8 weeks.  After the wound is healed you may discontinue dressing changes.    You may experience a sensation of tightness as your wound heals. This is normal and will gradually subside.    Your healed wound may be sensitive to temperature changes. This sensitivity improves with time, but if you re having a lot of discomfort, try to avoid temperature extremes.    Patients frequently experience itching after their wound appears to have healed because of the continue healing under the skin.  Plain Vaseline will help relieve the itching.      POSSIBLE COMPLICATIONS    BLEEDIN. Leave the bandage in place.  2. Use tightly rolled up gauze or a cloth to apply direct pressure over the bandage for 30  minutes.  3. Reapply pressure for an additional 30 minutes if necessary  4. Use additional gauze and tape to maintain pressure once the bleeding has stopped.

## 2021-11-05 NOTE — TELEPHONE ENCOUNTER
Pt stated that he is working currently and hoping to be to the clinic a little after 11. I called back line to see if that would be okay and no answer, told Pt that I will send a message to clinic and if there are any issues and it wouldn't work please call Pt and let him know, other wise he will be showing up.

## 2021-11-05 NOTE — TELEPHONE ENCOUNTER
[employer] message sent:    Gamal Lucio can see you today at 10:00 or 11:00 this morning.    Please come in if you can or reply back to this message.    Thanks,    CRIS Espino-BSN-PHN  [facility]  [phone]

## 2021-11-05 NOTE — TELEPHONE ENCOUNTER
"Called and LM for patient \"okay to come in at 11:00am for appointment\"    RN has added patient to provider schedule.    CRIS Espino-BSN-PHN  [facility]  [phone]  "

## 2021-11-05 NOTE — LETTER
11/5/2021         RE: Mike Schafer  [address]  [facility] [address]        Dear Colleague,    Thank you for referring your patient, Mike Schafer, to the [facility]. Please see a copy of my visit note below.    HPI:   Chief complaints: Mike Schafer is a pleasant 51 year old male who presents for evaluation of vasculitis. He has had a red non blanching rash for the past 1 month, which started on his feet and has progressed proximally. For a time it seemed to be getting better, but recently he started making new areas on the arms. He feels perfectly normal otherwise; no fever, myalgias, swelling. This did start approx 1 week after his 2nd covid vaccine. He tried a 5 day course of steroids previously, which made him urinate excessively.       PHYSICAL EXAM:    /77   Pulse 56   SpO2 96%   Skin exam performed as follows: Type 2 skin. Mood appropriate  Alert and Oriented X 3. Well developed, well nourished in no distress.  General appearance: Normal  Head including face: Normal  Eyes: conjunctiva and lids: Normal  Mouth: Lips, teeth, gums: Normal  Neck: Normal  Cardiovascular: Exam of peripheral vascular system by observation for swelling, varicosities, edema: Normal  Right upper extremity: Normal  Left upper extremity: Normal  Right lower extremity: Normal  Left lower extremity: Normal  Skin: Scalp and body hair: See below    Numerous non blanching macules on the lower legs, abdomen, back; non blanching papules on the arms    ASSESSMENT/PLAN:     1. Vasculitis r/o other on the right wrist. Shave biopsy performed.  Area cleaned and anesthetized with 1% lidocaine with epinephrine.  Dermablade used to remove the lesion and sent to pathology; divided in two for H&E and DIF. Bleeding was cauterized. Pt tolerated procedure well with no complications.   --Check labs today   --will hold off on treatment until pathology returns          Follow-up: 1 month  CC:    Scribed By: Khadijah Perez, MS, PAKofiC          Again, thank you for allowing me to participate in the care of your patient.        Sincerely,        Khadijah Perez PA-C

## 2021-11-05 NOTE — PROGRESS NOTES
HPI:   Chief complaints: Mike Schafer is a pleasant 51 year old male who presents for evaluation of vasculitis. He has had a red non blanching rash for the past 1 month, which started on his feet and has progressed proximally. For a time it seemed to be getting better, but recently he started making new areas on the arms. He feels perfectly normal otherwise; no fever, myalgias, swelling. This did start approx 1 week after his 2nd covid vaccine. He tried a 5 day course of steroids previously, which made him urinate excessively.       PHYSICAL EXAM:    /77   Pulse 56   SpO2 96%   Skin exam performed as follows: Type 2 skin. Mood appropriate  Alert and Oriented X 3. Well developed, well nourished in no distress.  General appearance: Normal  Head including face: Normal  Eyes: conjunctiva and lids: Normal  Mouth: Lips, teeth, gums: Normal  Neck: Normal  Cardiovascular: Exam of peripheral vascular system by observation for swelling, varicosities, edema: Normal  Right upper extremity: Normal  Left upper extremity: Normal  Right lower extremity: Normal  Left lower extremity: Normal  Skin: Scalp and body hair: See below    Numerous non blanching macules on the lower legs, abdomen, back; non blanching papules on the arms    ASSESSMENT/PLAN:     1. Vasculitis r/o other on the right wrist. Shave biopsy performed.  Area cleaned and anesthetized with 1% lidocaine with epinephrine.  Dermablade used to remove the lesion and sent to pathology; divided in two for H&E and DIF. Bleeding was cauterized. Pt tolerated procedure well with no complications.   --Check labs today   --will hold off on treatment until pathology returns          Follow-up: 1 month  CC:   Scribed By: Khadijah Perez, MS, PA-C

## 2021-11-05 NOTE — TELEPHONE ENCOUNTER
Called and LM for patient to check mychart.    Kenzie RN-BSN-PHN  [facility]  [phone]

## 2021-11-08 LAB
ANA SER QL IF: NEGATIVE
ENA SM IGG SER IA-ACNC: 2.2 U/ML
ENA SM IGG SER IA-ACNC: NEGATIVE
ENA SS-A AB SER IA-ACNC: 0.5 U/ML
ENA SS-A AB SER IA-ACNC: NEGATIVE
ENA SS-B IGG SER IA-ACNC: <0.6 U/ML
ENA SS-B IGG SER IA-ACNC: NEGATIVE
U1 SNRNP IGG SER IA-ACNC: 1.4 U/ML
U1 SNRNP IGG SER IA-ACNC: NEGATIVE

## 2021-11-10 ENCOUNTER — TELEPHONE (OUTPATIENT)
Dept: DERMATOLOGY | Facility: CLINIC | Age: 51
End: 2021-11-10
Payer: COMMERCIAL

## 2021-11-10 DIAGNOSIS — M31.0 LEUKOCYTOCLASTIC VASCULITIS (H): Primary | ICD-10-CM

## 2021-11-10 LAB
PATH REPORT.COMMENTS IMP SPEC: NORMAL
PATH REPORT.FINAL DX SPEC: NORMAL
PATH REPORT.GROSS SPEC: NORMAL
PATH REPORT.MICROSCOPIC SPEC OTHER STN: NORMAL
PATH REPORT.RELEVANT HX SPEC: NORMAL

## 2021-11-10 NOTE — TELEPHONE ENCOUNTER
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 This is inflammation if the small vessels - most of the time we do no know what causes this, but can be caused by certain antibiotics and other medications (which he does not take). To be safe, I'd like to run additional blood work to make sure there are no internal abnormalities. Hopefully this should start to resolve soon - unfortunately there are no medications that can make the red dots go away faster.

## 2021-11-11 NOTE — TELEPHONE ENCOUNTER
Last Read in [employer]   11/11/2021 11:15 PM by Mike Schafer  [employer] message sent:    Gamal Mckeon-    Your pathology confirmed leukocytoclastic vasculitis.     This is inflammation of the small vessels - most of the time we do no know what causes this, but can be caused by certain antibiotics and other medications (which you don't take).     To be safe, Khadijah would like to run additional blood work to make sure there are no internal abnormalities. Orders have been placed, you can go to any [facility] lab for this.    Hopefully this should start to resolve soon - unfortunately there are no medications that can make the red dots go away faster.     Let me know if you have any questions,    Kenzie RN-BSN-N  [facility]  [phone]

## 2021-11-12 ENCOUNTER — LAB (OUTPATIENT)
Dept: LAB | Facility: CLINIC | Age: 51
End: 2021-11-12
Payer: COMMERCIAL

## 2021-11-12 DIAGNOSIS — M31.0 LEUKOCYTOCLASTIC VASCULITIS (H): ICD-10-CM

## 2021-11-12 DIAGNOSIS — M31.0 LEUCOCYTOCLASTIC VASCULITIS (H): Primary | ICD-10-CM

## 2021-11-12 DIAGNOSIS — M31.0 LEUCOCYTOCLASTIC VASCULITIS (H): ICD-10-CM

## 2021-11-12 LAB
HBV SURFACE AG SERPL QL IA: NONREACTIVE
HCV AB SERPL QL IA: NONREACTIVE
HIV 1+2 AB+HIV1 P24 AG SERPL QL IA: NONREACTIVE

## 2021-11-12 PROCEDURE — 86803 HEPATITIS C AB TEST: CPT

## 2021-11-12 PROCEDURE — 86162 COMPLEMENT TOTAL (CH50): CPT

## 2021-11-12 PROCEDURE — 36415 COLL VENOUS BLD VENIPUNCTURE: CPT

## 2021-11-12 PROCEDURE — 86160 COMPLEMENT ANTIGEN: CPT

## 2021-11-12 PROCEDURE — 87340 HEPATITIS B SURFACE AG IA: CPT

## 2021-11-12 PROCEDURE — 87389 HIV-1 AG W/HIV-1&-2 AB AG IA: CPT

## 2021-11-12 PROCEDURE — 86255 FLUORESCENT ANTIBODY SCREEN: CPT

## 2021-11-12 PROCEDURE — 86038 ANTINUCLEAR ANTIBODIES: CPT

## 2021-11-14 LAB — CH50 SERPL-ACNC: >94.5 U/ML

## 2021-11-15 LAB
ANCA AB PATTERN SER IF-IMP: NORMAL
C-ANCA TITR SER IF: NORMAL {TITER}
C3 SERPL-MCNC: 163 MG/DL (ref 81–157)
C4 SERPL-MCNC: 40 MG/DL (ref 13–39)

## 2021-11-16 ENCOUNTER — TELEPHONE (OUTPATIENT)
Dept: DERMATOLOGY | Facility: CLINIC | Age: 51
End: 2021-11-16
Payer: COMMERCIAL

## 2021-11-16 LAB — ANA SER QL IF: NEGATIVE

## 2021-11-16 NOTE — TELEPHONE ENCOUNTER
----- Message from Khadijah Perez PA-C sent at 11/16/2021  4:54 PM CST -----  All labs look ok. I would anticipate his rash should gradually fade. How is he doing?

## 2021-11-17 NOTE — TELEPHONE ENCOUNTER
[employer] message sent:    Gamal Mckeon-    All your labs came back within normal limits.    Your rash should gradually fade- have you had any improvement?    CRIS Espino-BSN-N  [facility]  [phone]

## 2021-11-17 NOTE — TELEPHONE ENCOUNTER
Called and LM for patient to check mychart.    Kenzie RN-BSN-PHN  [facility]  [phone]

## 2022-02-16 ENCOUNTER — ANCILLARY PROCEDURE (OUTPATIENT)
Dept: GENERAL RADIOLOGY | Facility: CLINIC | Age: 52
End: 2022-02-16
Attending: FAMILY MEDICINE
Payer: COMMERCIAL

## 2022-02-16 ENCOUNTER — OFFICE VISIT (OUTPATIENT)
Dept: FAMILY MEDICINE | Facility: CLINIC | Age: 52
End: 2022-02-16
Payer: COMMERCIAL

## 2022-02-16 VITALS
OXYGEN SATURATION: 97 % | SYSTOLIC BLOOD PRESSURE: 152 MMHG | WEIGHT: 315 LBS | TEMPERATURE: 97.6 F | HEART RATE: 64 BPM | RESPIRATION RATE: 16 BRPM | BODY MASS INDEX: 47.74 KG/M2 | HEIGHT: 68 IN | DIASTOLIC BLOOD PRESSURE: 86 MMHG

## 2022-02-16 DIAGNOSIS — R74.8 ELEVATED LIVER ENZYMES: ICD-10-CM

## 2022-02-16 DIAGNOSIS — M31.0 LEUKOCYTOCLASTIC VASCULITIS (H): ICD-10-CM

## 2022-02-16 DIAGNOSIS — R10.32 LLQ ABDOMINAL PAIN: ICD-10-CM

## 2022-02-16 DIAGNOSIS — I10 BENIGN ESSENTIAL HYPERTENSION: ICD-10-CM

## 2022-02-16 DIAGNOSIS — R05.9 COUGH: ICD-10-CM

## 2022-02-16 LAB
ALBUMIN UR-MCNC: NEGATIVE MG/DL
APPEARANCE UR: CLEAR
BILIRUB UR QL STRIP: NEGATIVE
COLOR UR AUTO: YELLOW
ERYTHROCYTE [DISTWIDTH] IN BLOOD BY AUTOMATED COUNT: 13.2 % (ref 10–15)
ERYTHROCYTE [SEDIMENTATION RATE] IN BLOOD BY WESTERGREN METHOD: 12 MM/HR (ref 0–20)
GLUCOSE UR STRIP-MCNC: NEGATIVE MG/DL
HCT VFR BLD AUTO: 44.8 % (ref 40–53)
HGB BLD-MCNC: 15 G/DL (ref 13.3–17.7)
HGB UR QL STRIP: ABNORMAL
KETONES UR STRIP-MCNC: NEGATIVE MG/DL
LEUKOCYTE ESTERASE UR QL STRIP: NEGATIVE
MCH RBC QN AUTO: 29.3 PG (ref 26.5–33)
MCHC RBC AUTO-ENTMCNC: 33.5 G/DL (ref 31.5–36.5)
MCV RBC AUTO: 88 FL (ref 78–100)
NITRATE UR QL: NEGATIVE
PH UR STRIP: 6 [PH] (ref 5–7)
PLATELET # BLD AUTO: 336 10E3/UL (ref 150–450)
RBC # BLD AUTO: 5.12 10E6/UL (ref 4.4–5.9)
RBC #/AREA URNS AUTO: NORMAL /HPF
SP GR UR STRIP: 1.02 (ref 1–1.03)
UROBILINOGEN UR STRIP-ACNC: 0.2 E.U./DL
WBC # BLD AUTO: 8.4 10E3/UL (ref 4–11)
WBC #/AREA URNS AUTO: NORMAL /HPF

## 2022-02-16 PROCEDURE — 86140 C-REACTIVE PROTEIN: CPT | Performed by: FAMILY MEDICINE

## 2022-02-16 PROCEDURE — 71046 X-RAY EXAM CHEST 2 VIEWS: CPT | Performed by: RADIOLOGY

## 2022-02-16 PROCEDURE — 85652 RBC SED RATE AUTOMATED: CPT | Performed by: FAMILY MEDICINE

## 2022-02-16 PROCEDURE — 81001 URINALYSIS AUTO W/SCOPE: CPT | Performed by: FAMILY MEDICINE

## 2022-02-16 PROCEDURE — 85027 COMPLETE CBC AUTOMATED: CPT | Performed by: FAMILY MEDICINE

## 2022-02-16 PROCEDURE — 99214 OFFICE O/P EST MOD 30 MIN: CPT | Performed by: FAMILY MEDICINE

## 2022-02-16 PROCEDURE — 36415 COLL VENOUS BLD VENIPUNCTURE: CPT | Performed by: FAMILY MEDICINE

## 2022-02-16 PROCEDURE — 80053 COMPREHEN METABOLIC PANEL: CPT | Performed by: FAMILY MEDICINE

## 2022-02-16 NOTE — PROGRESS NOTES
"  Assessment & Plan   See after visit summary and result note from studies for helpful information and advice given to patient.    Leukocytoclastic vasculitis (H)  We will consider possible eventual referral to rheumatology, after patient first gets evaluated by gastroenterology.  - CBC with platelets  - CRP, inflammation  - ESR: Erythrocyte sedimentation rate  - UA Macro with Reflex to Micro and Culture - lab collect  - UA Macro with Reflex to Micro and Culture - lab collect  - Urine Microscopic    LLQ abdominal pain    - CBC with platelets  - Comprehensive metabolic panel  - CRP, inflammation  - ESR: Erythrocyte sedimentation rate  - Adult Gastro Ref - Consult Only    Benign essential hypertension  Blood pressure has recently been highly variable over the past year.  We will continue to monitor while continuing current treatment.  - Comprehensive metabolic panel    Cough  Reassuring exam and x-ray study at this visit.  No new treatment recommended.  - XR Chest 2 Views    Elevated liver enzymes    - Comprehensive metabolic panel               BMI:   Estimated body mass index is 48.2 kg/m  as calculated from the following:    Height as of this encounter: 1.727 m (5' 8\").    Weight as of this encounter: 143.8 kg (317 lb).           Return in about 3 months (around 5/16/2022) for Routine preventive.    Yuval Evans DO  [facility] Wetzel is a 51 year old who presents for the following health issues     History of Present Illness       He eats 0-1 servings of fruits and vegetables daily.He consumes 3 sweetened beverage(s) daily.He exercises with enough effort to increase his heart rate 10 to 19 minutes per day.  He exercises with enough effort to increase his heart rate 3 or less days per week.   He is taking medications regularly.     Follow up on Vasculitis    Hypertension Follow-up      Do you check your blood pressure regularly outside of the clinic? No     Are you following " "a low salt diet? Yes    Are your blood pressures ever more than 140 on the top number (systolic) OR more   than 90 on the bottom number (diastolic), for example 140/90? No        Review of Systems   Patient is seen for follow-up of a couple of different conditions.    Patient has had bilateral mid abdominal burning pain for the past month, on/off not related to eating.     BMs are normal.     The vasculitis rash he has been evaluated for by me, and by dermatology clinic is burning on trunk and legs. We discussed at exam following up with dermatology clinic, and also considering getting evaluated through rheumatology after reviewing some of his labs which are suggestive of a possible rheumatologic condition.    At time of exam, patient has abdominal rash similar to this past October.     No abnormal joint or muscle aches.     Patient states he has had a burning sensation which seems to be inside of his abdomen in the left lateral, and on day of exam in the left lower quadrant region.  He is agreeable to my suggestion of getting evaluated by gastroenterology for possible colon related problem.    Patient has had a cough with cold exposure and exertion the past couple months.  He does not feel like he is struggling to breathe, and has not had any recent fevers or chills.        Objective    BP (!) 152/86 (BP Location: Right leg, Patient Position: Sitting, Cuff Size: Adult Large)   Pulse 64   Temp 97.6  F (36.4  C)   Resp 16   Ht 1.727 m (5' 8\")   Wt 143.8 kg (317 lb)   SpO2 97%   BMI 48.20 kg/m    Body mass index is 48.2 kg/m .  Physical Exam   Vital signs reviewed.  Patient is in no acute appearing distress.  Breathing appears nonlabored.  Patient is alert and oriented ×3.  Patient is very pleasant, making good eye contact and responding with clear fluent speech.    ENT: Ear exam shows bilateral tympanic membranes to be clear without injection, nasal turbinates show no injection or edema, no pharyngeal " injection or exudate.    Neck: supple with no adenoapthy, palpable abnormal masses, or thyroid abnormality.    Heart: Heart rate is regular without murmur.    Lungs: Lungs are clear to auscultation with good airflow bilaterally. No coughing at time of exam.    Abdomen:  Abdomen is soft, nontender.  No palpable abnormal masses or organomegaly.  Bowel sounds are normal.    Skin exam: Patient has a partially blancheble rash on abdomen similar to what I noted at last exam.  See pictures from that visit for reference.    Results for orders placed or performed in visit on 02/16/22   XR Chest 2 Views     Status: None    Narrative    CHEST TWO VIEWS February 16, 2022 4:38 PM     HISTORY: Cough.    COMPARISON: 5/20/2021.    FINDINGS: Heart size and pulmonary vascularity are within normal  limits. The lungs are clear. No pneumothorax or pleural effusion.       Impression    IMPRESSION: No radiographic evidence of acute chest abnormality.     JENNIFER LAWSON MD         SYSTEM ID:  [id number]   Results for orders placed or performed in visit on 02/16/22   CBC with platelets     Status: Normal   Result Value Ref Range    WBC Count 8.4 4.0 - 11.0 10e3/uL    RBC Count 5.12 4.40 - 5.90 10e6/uL    Hemoglobin 15.0 13.3 - 17.7 g/dL    Hematocrit 44.8 40.0 - 53.0 %    MCV 88 78 - 100 fL    MCH 29.3 26.5 - 33.0 pg    MCHC 33.5 31.5 - 36.5 g/dL    RDW 13.2 10.0 - 15.0 %    Platelet Count 336 150 - 450 10e3/uL   Comprehensive metabolic panel     Status: Abnormal   Result Value Ref Range    Sodium 137 133 - 144 mmol/L    Potassium 4.4 3.4 - 5.3 mmol/L    Chloride 104 94 - 109 mmol/L    Carbon Dioxide (CO2) 28 20 - 32 mmol/L    Anion Gap 5 3 - 14 mmol/L    Urea Nitrogen 12 7 - 30 mg/dL    Creatinine 0.78 0.66 - 1.25 mg/dL    Calcium 9.4 8.5 - 10.1 mg/dL    Glucose 81 70 - 99 mg/dL    Alkaline Phosphatase 87 40 - 150 U/L    AST 47 (H) 0 - 45 U/L    ALT 70 0 - 70 U/L    Protein Total 8.0 6.8 - 8.8 g/dL    Albumin 3.7 3.4 - 5.0 g/dL     Bilirubin Total 1.1 0.2 - 1.3 mg/dL    GFR Estimate >90 >60 mL/min/1.73m2   CRP, inflammation     Status: Abnormal   Result Value Ref Range    CRP Inflammation 8.2 (H) 0.0 - 8.0 mg/L   ESR: Erythrocyte sedimentation rate     Status: Normal   Result Value Ref Range    Erythrocyte Sedimentation Rate 12 0 - 20 mm/hr   UA Macro with Reflex to Micro and Culture - lab collect     Status: Abnormal    Specimen: Urine, Clean Catch   Result Value Ref Range    Color Urine Yellow Colorless, Straw, Light Yellow, Yellow    Appearance Urine Clear Clear    Glucose Urine Negative Negative mg/dL    Bilirubin Urine Negative Negative    Ketones Urine Negative Negative mg/dL    Specific Gravity Urine 1.025 1.003 - 1.035    Blood Urine Trace (A) Negative    pH Urine 6.0 5.0 - 7.0    Protein Albumin Urine Negative Negative mg/dL    Urobilinogen Urine 0.2 0.2, 1.0 E.U./dL    Nitrite Urine Negative Negative    Leukocyte Esterase Urine Negative Negative   Urine Microscopic     Status: Normal   Result Value Ref Range    RBC Urine None Seen 0-2 /HPF /HPF    WBC Urine None Seen 0-5 /HPF /HPF    Narrative    Urine Culture not indicated     Normal x-ray study reviewed with patient at time of exam.    Lab results not available for review at time of exam.  Please see result note.

## 2022-02-17 ENCOUNTER — TRANSFERRED RECORDS (OUTPATIENT)
Dept: HEALTH INFORMATION MANAGEMENT | Facility: CLINIC | Age: 52
End: 2022-02-17
Payer: COMMERCIAL

## 2022-02-17 LAB
ALBUMIN SERPL-MCNC: 3.7 G/DL (ref 3.4–5)
ALP SERPL-CCNC: 87 U/L (ref 40–150)
ALT SERPL W P-5'-P-CCNC: 70 U/L (ref 0–70)
ANION GAP SERPL CALCULATED.3IONS-SCNC: 5 MMOL/L (ref 3–14)
AST SERPL W P-5'-P-CCNC: 47 U/L (ref 0–45)
BILIRUB SERPL-MCNC: 1.1 MG/DL (ref 0.2–1.3)
BUN SERPL-MCNC: 12 MG/DL (ref 7–30)
CALCIUM SERPL-MCNC: 9.4 MG/DL (ref 8.5–10.1)
CHLORIDE BLD-SCNC: 104 MMOL/L (ref 94–109)
CO2 SERPL-SCNC: 28 MMOL/L (ref 20–32)
CREAT SERPL-MCNC: 0.78 MG/DL (ref 0.66–1.25)
CRP SERPL-MCNC: 8.2 MG/L (ref 0–8)
GFR SERPL CREATININE-BSD FRML MDRD: >90 ML/MIN/1.73M2
GLUCOSE BLD-MCNC: 81 MG/DL (ref 70–99)
POTASSIUM BLD-SCNC: 4.4 MMOL/L (ref 3.4–5.3)
PROT SERPL-MCNC: 8 G/DL (ref 6.8–8.8)
SODIUM SERPL-SCNC: 137 MMOL/L (ref 133–144)

## 2022-03-09 ENCOUNTER — TRANSFERRED RECORDS (OUTPATIENT)
Dept: HEALTH INFORMATION MANAGEMENT | Facility: CLINIC | Age: 52
End: 2022-03-09
Payer: COMMERCIAL

## 2022-04-06 ENCOUNTER — TRANSFERRED RECORDS (OUTPATIENT)
Dept: HEALTH INFORMATION MANAGEMENT | Facility: CLINIC | Age: 52
End: 2022-04-06
Payer: COMMERCIAL

## 2022-05-02 ENCOUNTER — TRANSFERRED RECORDS (OUTPATIENT)
Dept: HEALTH INFORMATION MANAGEMENT | Facility: CLINIC | Age: 52
End: 2022-05-02
Payer: COMMERCIAL

## 2022-05-09 ENCOUNTER — TRANSFERRED RECORDS (OUTPATIENT)
Dept: HEALTH INFORMATION MANAGEMENT | Facility: CLINIC | Age: 52
End: 2022-05-09
Payer: COMMERCIAL

## 2022-05-09 LAB
ALT SERPL-CCNC: 49 IU/L (ref 0–44)
AST SERPL-CCNC: 38 IU/L (ref 0–40)
HBA1C MFR BLD: 5.3 % (ref 4.8–5.6)
HEP C HIM: NORMAL

## 2022-06-23 ENCOUNTER — OFFICE VISIT (OUTPATIENT)
Dept: FAMILY MEDICINE | Facility: CLINIC | Age: 52
End: 2022-06-23
Payer: COMMERCIAL

## 2022-06-23 VITALS
BODY MASS INDEX: 48.5 KG/M2 | HEART RATE: 62 BPM | SYSTOLIC BLOOD PRESSURE: 138 MMHG | RESPIRATION RATE: 16 BRPM | OXYGEN SATURATION: 98 % | WEIGHT: 315 LBS | TEMPERATURE: 97.8 F | DIASTOLIC BLOOD PRESSURE: 86 MMHG

## 2022-06-23 DIAGNOSIS — G47.33 OSA (OBSTRUCTIVE SLEEP APNEA): ICD-10-CM

## 2022-06-23 DIAGNOSIS — E66.01 MORBID OBESITY (H): ICD-10-CM

## 2022-06-23 DIAGNOSIS — I10 BENIGN ESSENTIAL HYPERTENSION: ICD-10-CM

## 2022-06-23 DIAGNOSIS — Z01.818 PREOP GENERAL PHYSICAL EXAM: Primary | ICD-10-CM

## 2022-06-23 PROCEDURE — 99214 OFFICE O/P EST MOD 30 MIN: CPT | Performed by: PHYSICIAN ASSISTANT

## 2022-06-23 RX ORDER — OMEPRAZOLE 40 MG/1
CAPSULE, DELAYED RELEASE ORAL
COMMUNITY
Start: 2022-02-17 | End: 2023-12-13

## 2022-06-23 RX ORDER — DICYCLOMINE HYDROCHLORIDE 10 MG/1
CAPSULE ORAL
COMMUNITY
Start: 2022-02-17 | End: 2023-12-13

## 2022-06-23 SDOH — ECONOMIC STABILITY: INCOME INSECURITY: HOW HARD IS IT FOR YOU TO PAY FOR THE VERY BASICS LIKE FOOD, HOUSING, MEDICAL CARE, AND HEATING?: PATIENT DECLINED

## 2022-06-23 SDOH — HEALTH STABILITY: PHYSICAL HEALTH: ON AVERAGE, HOW MANY DAYS PER WEEK DO YOU ENGAGE IN MODERATE TO STRENUOUS EXERCISE (LIKE A BRISK WALK)?: 1 DAY

## 2022-06-23 SDOH — ECONOMIC STABILITY: INCOME INSECURITY: IN THE LAST 12 MONTHS, WAS THERE A TIME WHEN YOU WERE NOT ABLE TO PAY THE MORTGAGE OR RENT ON TIME?: NO

## 2022-06-23 SDOH — HEALTH STABILITY: PHYSICAL HEALTH: ON AVERAGE, HOW MANY MINUTES DO YOU ENGAGE IN EXERCISE AT THIS LEVEL?: 10 MIN

## 2022-06-23 SDOH — ECONOMIC STABILITY: FOOD INSECURITY: WITHIN THE PAST 12 MONTHS, THE FOOD YOU BOUGHT JUST DIDN'T LAST AND YOU DIDN'T HAVE MONEY TO GET MORE.: PATIENT DECLINED

## 2022-06-23 SDOH — ECONOMIC STABILITY: FOOD INSECURITY: WITHIN THE PAST 12 MONTHS, YOU WORRIED THAT YOUR FOOD WOULD RUN OUT BEFORE YOU GOT MONEY TO BUY MORE.: PATIENT DECLINED

## 2022-06-23 ASSESSMENT — SOCIAL DETERMINANTS OF HEALTH (SDOH)
HOW OFTEN DO YOU GET TOGETHER WITH FRIENDS OR RELATIVES?: ONCE A WEEK
DO YOU BELONG TO ANY CLUBS OR ORGANIZATIONS SUCH AS CHURCH GROUPS UNIONS, FRATERNAL OR ATHLETIC GROUPS, OR SCHOOL GROUPS?: YES
HOW OFTEN DO YOU ATTEND CHURCH OR RELIGIOUS SERVICES?: PATIENT DECLINED
IN A TYPICAL WEEK, HOW MANY TIMES DO YOU TALK ON THE PHONE WITH FAMILY, FRIENDS, OR NEIGHBORS?: TWICE A WEEK

## 2022-06-23 ASSESSMENT — LIFESTYLE VARIABLES
SKIP TO QUESTIONS 9-10: 1
HOW MANY STANDARD DRINKS CONTAINING ALCOHOL DO YOU HAVE ON A TYPICAL DAY: PATIENT DOES NOT DRINK
HOW OFTEN DO YOU HAVE A DRINK CONTAINING ALCOHOL: MONTHLY OR LESS
HOW OFTEN DO YOU HAVE SIX OR MORE DRINKS ON ONE OCCASION: NEVER
AUDIT-C TOTAL SCORE: 1

## 2022-06-23 NOTE — PATIENT INSTRUCTIONS
Preparing for Your Surgery  Getting started  A nurse will call you to review your health history and instructions. They will give you an arrival time based on your scheduled surgery time. Please be ready to share:    Your doctor's clinic name and phone number    Your medical, surgical and anesthesia history    A list of allergies and sensitivities    A list of medicines, including herbal treatments and over-the-counter drugs    Whether the patient has a legal guardian (ask how to send us the papers in advance)  Please tell us if you're pregnant--or if there's any chance you might be pregnant. Some surgeries may injure a fetus (unborn baby), so they require a pregnancy test. Surgeries that are safe for a fetus don't always need a test, and you can choose whether to have one.   If you have a child who's having surgery, please ask for a copy of Preparing for Your Child's Surgery.    Preparing for surgery    Within 30 days of surgery: Have a pre-op exam (sometimes called an H&P, or History and Physical). This can be done at a clinic or pre-operative center.  ? If you're having a , you may not need this exam. Talk to your care team.    At your pre-op exam, talk to your care team about all medicines you take. If you need to stop any medicines before surgery, ask when to start taking them again.  ? We do this for your safety. Many medicines can make you bleed too much during surgery. Some change how well surgery (anesthesia) drugs work.    Call your insurance company to let them know you're having surgery. (If you don't have insurance, call [phone].)    Call your clinic if there's any change in your health. This includes signs of a cold or flu (sore throat, runny nose, cough, rash, fever). It also includes a scrape or scratch near the surgery site.    If you have questions on the day of surgery, call your hospital or surgery center.  COVID testing  You may need to be tested for COVID-19 before having  surgery. If so, we will give you instructions.  Eating and drinking guidelines  For your safety: Unless your surgeon tells you otherwise, follow the guidelines below.    Eat and drink as usual until 8 hours before surgery. After that, no food or milk.    Drink clear liquids until 2 hours before surgery. These are liquids you can see through, like water, Gatorade and Propel Water. You may also have black coffee and tea (no cream or milk).    Nothing by mouth within 2 hours of surgery. This includes gum, candy and breath mints.    If you drink alcohol: Stop drinking it the night before surgery.    If your care team tells you to take medicine on the morning of surgery, it's okay to take it with a sip of water.  Preventing infection    Shower or bathe the night before and morning of your surgery. Follow the instructions your clinic gave you. (If no instructions, use regular soap.)    Don't shave or clip hair near your surgery site. We'll remove the hair if needed.    Don't smoke or vape the morning of surgery. You may chew nicotine gum up to 2 hours before surgery. A nicotine patch is okay.  ? Note: Some surgeries require you to completely quit smoking and nicotine. Check with your surgeon.    Your care team will make every effort to keep you safe from infection. We will:  ? Clean our hands often with soap and water (or an alcohol-based hand rub).  ? Clean the skin at your surgery site with a special soap that kills germs.  ? Give you a special gown to keep you warm. (Cold raises the risk of infection.)  ? Wear special hair covers, masks, gowns and gloves during surgery.  ? Give antibiotic medicine, if prescribed. Not all surgeries need antibiotics.  What to bring on the day of surgery    Photo ID and insurance card    Copy of your health care directive, if you have one    Glasses and hearing aides (bring cases)  ? You can't wear contacts during surgery    Inhaler and eye drops, if you use them (tell us about these when  you arrive)    CPAP machine or breathing device, if you use them    A few personal items, if spending the night    If you have . . .  ? A pacemaker, ICD (cardiac defibrillator) or other implant: Bring the ID card.  ? An implanted stimulator: Bring the remote control.  ? A legal guardian: Bring a copy of the certified (court-stamped) guardianship papers.  Please remove any jewelry, including body piercings. Leave jewelry and other valuables at home.  If you're going home the day of surgery    You must have a responsible adult drive you home. They should stay with you overnight as well.    If you don't have someone to stay with you, and you aren't safe to go home alone, we may keep you overnight. Insurance often won't pay for this.  After surgery  If it's hard to control your pain or you need more pain medicine, please call your surgeon's office.  Questions?   If you have any questions for your care team, list them here: _________________________________________________________________________________________________________________________________________________________________________ ____________________________________ ____________________________________ ____________________________________  For informational purposes only. Not to replace the advice of your health care provider. Copyright   2003, 2019 [facility]. All rights reserved. Clinically reviewed by Penny Liang MD. [employer] 368508 - REV 07/21.

## 2022-06-23 NOTE — PROGRESS NOTES
[facility]  [address] [facility] [address]  Phone: [phone]  Primary Provider: Ok Shepherd  Pre-op Performing Provider: AASEBY-AGUILERA, RAMONA ANN      PREOPERATIVE EVALUATION:  Today's date: 6/23/2022    Mike Schafer is a 51 year old male who presents for a preoperative evaluation.    Surgical Information:  Surgery/Procedure: Colonoscopy  Surgery Location: [facility]  Surgeon: Dr. Anne  Surgery Date: 6/28/22  Time of Surgery: Unknown  Where patient plans to recover: At home with family  Fax number for surgical facility: [phone]    Type of Anesthesia Anticipated: conscious sedation     Assessment & Plan     The proposed surgical procedure is considered LOW risk.    (Z01.818) Preop general physical exam  (primary encounter diagnosis)  Comment:   Plan:     (E66.01) Morbid obesity (H)  Comment:   Plan:     (G47.33) GIACOMO (obstructive sleep apnea)  Comment:   Plan:     (I10) Benign essential hypertension  Comment:   Plan: controlled                 RECOMMENDATION:  APPROVAL GIVEN to proceed with proposed procedure, without further diagnostic evaluation.                      Subjective     HPI related to upcoming procedure: history of colon cancer in family     Preop Questions 6/23/2022   1. Have you ever had a heart attack or stroke? No   2. Have you ever had surgery on your heart or blood vessels, such as a stent placement, a coronary artery bypass, or surgery on an artery in your head, neck, heart, or legs? No   3. Do you have chest pain with activity? No   4. Do you have a history of  heart failure? No   5. Do you currently have a cold, bronchitis or symptoms of other infection? No   6. Do you have a cough, shortness of breath, or wheezing? No   7. Do you or anyone in your family have previous history of blood clots? No   8. Do you or does anyone in your family have a serious bleeding problem such as prolonged bleeding following surgeries or  cuts? No   9. Have you ever had problems with anemia or been told to take iron pills? No   10. Have you had any abnormal blood loss such as black, tarry or bloody stools? No   11. Have you ever had a blood transfusion? No   12. Are you willing to have a blood transfusion if it is medically needed before, during, or after your surgery? Yes   13. Have you or any of your relatives ever had problems with anesthesia? No   14. Do you have sleep apnea, excessive snoring or daytime drowsiness? YES - does not use a cpap   14a. Do you have a CPAP machine? No   15. Do you have any artifical heart valves or other implanted medical devices like a pacemaker, defibrillator, or continuous glucose monitor? No   16. Do you have artificial joints? No   17. Are you allergic to latex? No       Health Care Directive:  Patient does not have a Health Care Directive or Living Will: Discussed advance care planning with patient; however, patient declined at this time.    Preoperative Review of :   reviewed - no record of controlled substances prescribed.      Status of Chronic Conditions:  Hypertention: well controlled   SLEEP PROBLEM - Patient has a longstanding history of snoring.. Patient has tried OTC medications with limited success.       Review of Systems  CONSTITUTIONAL: NEGATIVE for fever, chills, change in weight  ENT/MOUTH: NEGATIVE for ear, mouth and throat problems  RESP: NEGATIVE for significant cough or SOB  CV: NEGATIVE for chest pain, palpitations or peripheral edema    Patient Active Problem List    Diagnosis Date Noted     GIACOMO (obstructive sleep apnea) 07/21/2021     Priority: Medium     7/19/2021 [place] [facility] Sleep Study (325.0 lbs) - AHI 20.7, RDI 33.1, Supine AHI 37.8, REM AHI 25.8, Low O2 86.7%, Time Spent ?88% 0.3 minutes / Time Spent ?89% 1.0 minutes.       Morbid obesity (H) 05/20/2021     Priority: Medium     Benign essential hypertension 07/12/2016     Priority: Medium      Past Medical History:    Diagnosis Date     Hypertension      Past Surgical History:   Procedure Laterality Date     BIOPSY  November 2021     SOFT TISSUE SURGERY  03/17/2020     Current Outpatient Medications   Medication Sig Dispense Refill     albuterol (PROAIR HFA/PROVENTIL HFA/VENTOLIN HFA) 108 (90 Base) MCG/ACT inhaler INHALE 2 PUFFS INTO THE LUNGS EVERY 6 HOURS 8.5 g 0     fluticasone-salmeterol (AIRDUO RESPICLICK) 113-14 MCG/ACT inhaler Inhale 1 puff into the lungs 2 times daily 1 each 1     ketoconazole (NIZORAL) 2 % external shampoo Apply topically daily as needed for itching or irritation 120 mL 4     metoprolol succinate ER (TOPROL-XL) 50 MG 24 hr tablet Take 1 tablet (50 mg) by mouth daily 90 tablet 4       No Known Allergies     Social History     Tobacco Use     Smoking status: Never Smoker     Smokeless tobacco: Never Used   Substance Use Topics     Alcohol use: Not Currently     Family History   Problem Relation Age of Onset     Breast Cancer Mother      Diabetes Father      Other Cancer Maternal Grandmother      Other Cancer Paternal Grandfather      History   Drug Use No         Objective     /86   Pulse 62   Temp 97.8  F (36.6  C) (Oral)   Resp 16   Wt 144.7 kg (319 lb)   SpO2 98%   BMI 48.50 kg/m     JLW      Physical Exam  GENERAL APPEARANCE: healthy, alert and no distress  HENT: ear canals and TM's normal and nose and mouth without ulcers or lesions  RESP: lungs clear to auscultation - no rales, rhonchi or wheezes  CV: regular rate and rhythm, normal S1 S2, no S3 or S4 and no murmur, click or rub   ABDOMEN: soft, nontender, no HSM or masses and bowel sounds normal  NEURO: Normal strength and tone, sensory exam grossly normal, mentation intact and speech normal    Recent Labs   Lab Test 02/16/22  1640 11/05/21  1156 10/21/21  1506 10/20/21  1640 08/24/21  0739 04/16/21  0951   HGB 15.0 14.6   < > 14.7   < > 15.0    259   < > 360   < > 206   INR  --   --   --  1.00  --   --     136   < > 138    < > 137   POTASSIUM 4.4 4.1   < > 3.9   < > 3.8   CR 0.78 0.76   < > 0.81   < > 0.74   A1C  --   --   --   --   --  5.6    < > = values in this interval not displayed.        Diagnostics:  No labs were ordered during this visit.   No EKG required for low risk surgery (cataract, skin procedure, breast biopsy, etc).    Revised Cardiac Risk Index (RCRI):  The patient has the following serious cardiovascular risks for perioperative complications:   - No serious cardiac risks = 0 points     RCRI Interpretation: 0 points: Class I (very low risk - 0.4% complication rate)           Signed Electronically by: Ramona Ann Aaseby-Aguilera, PA-C  Copy of this evaluation report is provided to requesting physician.

## 2022-10-24 ENCOUNTER — MYC MEDICAL ADVICE (OUTPATIENT)
Dept: FAMILY MEDICINE | Facility: CLINIC | Age: 52
End: 2022-10-24

## 2022-10-24 DIAGNOSIS — I10 BENIGN ESSENTIAL HYPERTENSION: ICD-10-CM

## 2022-10-24 RX ORDER — METOPROLOL SUCCINATE 50 MG/1
50 TABLET, EXTENDED RELEASE ORAL DAILY
Qty: 90 TABLET | Refills: 2 | Status: SHIPPED | OUTPATIENT
Start: 2022-10-24 | End: 2023-07-20

## 2022-12-26 ENCOUNTER — HEALTH MAINTENANCE LETTER (OUTPATIENT)
Age: 52
End: 2022-12-26

## 2023-07-20 DIAGNOSIS — I10 BENIGN ESSENTIAL HYPERTENSION: ICD-10-CM

## 2023-07-20 RX ORDER — METOPROLOL SUCCINATE 50 MG/1
50 TABLET, EXTENDED RELEASE ORAL DAILY
Qty: 90 TABLET | Refills: 0 | Status: SHIPPED | OUTPATIENT
Start: 2023-07-20 | End: 2023-08-08

## 2023-08-08 ENCOUNTER — OFFICE VISIT (OUTPATIENT)
Dept: FAMILY MEDICINE | Facility: CLINIC | Age: 53
End: 2023-08-08
Payer: COMMERCIAL

## 2023-08-08 VITALS
HEART RATE: 65 BPM | TEMPERATURE: 97.9 F | OXYGEN SATURATION: 98 % | WEIGHT: 315 LBS | SYSTOLIC BLOOD PRESSURE: 142 MMHG | BODY MASS INDEX: 47.74 KG/M2 | RESPIRATION RATE: 18 BRPM | DIASTOLIC BLOOD PRESSURE: 100 MMHG | HEIGHT: 68 IN

## 2023-08-08 DIAGNOSIS — I10 BENIGN ESSENTIAL HYPERTENSION: ICD-10-CM

## 2023-08-08 DIAGNOSIS — E66.01 MORBID OBESITY (H): ICD-10-CM

## 2023-08-08 DIAGNOSIS — M31.0 LEUKOCYTOCLASTIC VASCULITIS (H): Primary | ICD-10-CM

## 2023-08-08 DIAGNOSIS — J45.20 MILD INTERMITTENT ASTHMA WITHOUT COMPLICATION: ICD-10-CM

## 2023-08-08 DIAGNOSIS — Z13.220 LIPID SCREENING: ICD-10-CM

## 2023-08-08 DIAGNOSIS — H10.33 ACUTE CONJUNCTIVITIS OF BOTH EYES, UNSPECIFIED ACUTE CONJUNCTIVITIS TYPE: ICD-10-CM

## 2023-08-08 LAB
ERYTHROCYTE [DISTWIDTH] IN BLOOD BY AUTOMATED COUNT: 13.1 % (ref 10–15)
ERYTHROCYTE [SEDIMENTATION RATE] IN BLOOD BY WESTERGREN METHOD: 11 MM/HR (ref 0–20)
HCT VFR BLD AUTO: 44.1 % (ref 40–53)
HGB BLD-MCNC: 14.6 G/DL (ref 13.3–17.7)
MCH RBC QN AUTO: 29.4 PG (ref 26.5–33)
MCHC RBC AUTO-ENTMCNC: 33.1 G/DL (ref 31.5–36.5)
MCV RBC AUTO: 89 FL (ref 78–100)
PLATELET # BLD AUTO: 293 10E3/UL (ref 150–450)
RBC # BLD AUTO: 4.96 10E6/UL (ref 4.4–5.9)
WBC # BLD AUTO: 8.7 10E3/UL (ref 4–11)

## 2023-08-08 PROCEDURE — 85027 COMPLETE CBC AUTOMATED: CPT | Performed by: FAMILY MEDICINE

## 2023-08-08 PROCEDURE — 85652 RBC SED RATE AUTOMATED: CPT | Performed by: FAMILY MEDICINE

## 2023-08-08 PROCEDURE — 99214 OFFICE O/P EST MOD 30 MIN: CPT | Performed by: FAMILY MEDICINE

## 2023-08-08 PROCEDURE — 80053 COMPREHEN METABOLIC PANEL: CPT | Performed by: FAMILY MEDICINE

## 2023-08-08 PROCEDURE — 80061 LIPID PANEL: CPT | Performed by: FAMILY MEDICINE

## 2023-08-08 PROCEDURE — 36415 COLL VENOUS BLD VENIPUNCTURE: CPT | Performed by: FAMILY MEDICINE

## 2023-08-08 RX ORDER — LISINOPRIL 10 MG/1
10 TABLET ORAL DAILY
Qty: 90 TABLET | Refills: 3 | Status: SHIPPED | OUTPATIENT
Start: 2023-08-08 | End: 2024-03-27 | Stop reason: ALTCHOICE

## 2023-08-08 RX ORDER — METOPROLOL SUCCINATE 50 MG/1
50 TABLET, EXTENDED RELEASE ORAL DAILY
Qty: 90 TABLET | Refills: 3 | Status: SHIPPED | OUTPATIENT
Start: 2023-08-08 | End: 2024-03-27

## 2023-08-08 RX ORDER — ALBUTEROL SULFATE 90 UG/1
2 AEROSOL, METERED RESPIRATORY (INHALATION) EVERY 6 HOURS
Qty: 8.5 G | Refills: 0 | Status: SHIPPED | OUTPATIENT
Start: 2023-08-08 | End: 2024-03-27

## 2023-08-08 RX ORDER — CIPROFLOXACIN HYDROCHLORIDE 3.5 MG/ML
1-2 SOLUTION/ DROPS TOPICAL 4 TIMES DAILY
Qty: 5 ML | Refills: 0 | Status: SHIPPED | OUTPATIENT
Start: 2023-08-08 | End: 2023-12-13

## 2023-08-08 ASSESSMENT — ENCOUNTER SYMPTOMS
ABDOMINAL DISTENTION: 0
ARTHRALGIAS: 0
DIZZINESS: 0
DIFFICULTY URINATING: 0
DYSURIA: 0
NERVOUS/ANXIOUS: 1
BACK PAIN: 0
EYE PAIN: 1
ABDOMINAL PAIN: 0

## 2023-08-08 ASSESSMENT — ANXIETY QUESTIONNAIRES
GAD7 TOTAL SCORE: 12
2. NOT BEING ABLE TO STOP OR CONTROL WORRYING: SEVERAL DAYS
1. FEELING NERVOUS, ANXIOUS, OR ON EDGE: SEVERAL DAYS
5. BEING SO RESTLESS THAT IT IS HARD TO SIT STILL: SEVERAL DAYS
3. WORRYING TOO MUCH ABOUT DIFFERENT THINGS: MORE THAN HALF THE DAYS
7. FEELING AFRAID AS IF SOMETHING AWFUL MIGHT HAPPEN: NEARLY EVERY DAY
6. BECOMING EASILY ANNOYED OR IRRITABLE: NEARLY EVERY DAY
IF YOU CHECKED OFF ANY PROBLEMS ON THIS QUESTIONNAIRE, HOW DIFFICULT HAVE THESE PROBLEMS MADE IT FOR YOU TO DO YOUR WORK, TAKE CARE OF THINGS AT HOME, OR GET ALONG WITH OTHER PEOPLE: NOT DIFFICULT AT ALL
GAD7 TOTAL SCORE: 12

## 2023-08-08 ASSESSMENT — PATIENT HEALTH QUESTIONNAIRE - PHQ9: 5. POOR APPETITE OR OVEREATING: SEVERAL DAYS

## 2023-08-08 ASSESSMENT — PAIN SCALES - GENERAL: PAINLEVEL: NO PAIN (0)

## 2023-08-08 NOTE — LETTER
August 14, 2023      Rashard Schafer  [id number]  [facility] [address]        Dear ,    We are writing to inform you of your test results.    Reviewed your results .   Normal lipid panel .   Cholesterol at goal .   Mild elevation in liver enzymes .   Likely due to fatty liver .   Recommend low cholesterol , low fat diet .   Will recheck in 6 months at follow up visit .       Resulted Orders   CBC with platelets   Result Value Ref Range    WBC Count 8.7 4.0 - 11.0 10e3/uL    RBC Count 4.96 4.40 - 5.90 10e6/uL    Hemoglobin 14.6 13.3 - 17.7 g/dL    Hematocrit 44.1 40.0 - 53.0 %    MCV 89 78 - 100 fL    MCH 29.4 26.5 - 33.0 pg    MCHC 33.1 31.5 - 36.5 g/dL    RDW 13.1 10.0 - 15.0 %    Platelet Count 293 150 - 450 10e3/uL   Comprehensive metabolic panel (BMP + Alb, Alk Phos, ALT, AST, Total. Bili, TP)   Result Value Ref Range    Sodium 140 136 - 145 mmol/L    Potassium 4.7 3.4 - 5.3 mmol/L    Chloride 103 98 - 107 mmol/L    Carbon Dioxide (CO2) 27 22 - 29 mmol/L    Anion Gap 10 7 - 15 mmol/L    Urea Nitrogen 12.4 6.0 - 20.0 mg/dL    Creatinine 0.73 0.67 - 1.17 mg/dL    Calcium 9.4 8.6 - 10.0 mg/dL    Glucose 86 70 - 99 mg/dL    Alkaline Phosphatase 72 40 - 129 U/L    AST 56 (H) 0 - 45 U/L      Comment:      Reference intervals for this test were updated on 6/12/2023 to more accurately reflect our healthy population. There may be differences in the flagging of prior results with similar values performed with this method. Interpretation of those prior results can be made in the context of the updated reference intervals.    ALT 86 (H) 0 - 70 U/L      Comment:      Reference intervals for this test were updated on 6/12/2023 to more accurately reflect our healthy population. There may be differences in the flagging of prior results with similar values performed with this method. Interpretation of those prior results can be made in the context of the updated reference intervals.      Protein Total 7.4  6.4 - 8.3 g/dL    Albumin 4.4 3.5 - 5.2 g/dL    Bilirubin Total 0.7 <=1.2 mg/dL    GFR Estimate >90 >60 mL/min/1.73m2   ESR: Erythrocyte sedimentation rate   Result Value Ref Range    Erythrocyte Sedimentation Rate 11 0 - 20 mm/hr   Lipid panel reflex to direct LDL Fasting   Result Value Ref Range    Cholesterol 157 <200 mg/dL    Triglycerides 74 <150 mg/dL    Direct Measure HDL 50 >=40 mg/dL    LDL Cholesterol Calculated 92 <=100 mg/dL    Non HDL Cholesterol 107 <130 mg/dL    Narrative    Cholesterol  Desirable:  <200 mg/dL    Triglycerides  Normal:  Less than 150 mg/dL  Borderline High:  150-199 mg/dL  High:  200-499 mg/dL  Very High:  Greater than or equal to 500 mg/dL    Direct Measure HDL  Female:  Greater than or equal to 50 mg/dL   Male:  Greater than or equal to 40 mg/dL    LDL Cholesterol  Desirable:  <100mg/dL  Above Desirable:  100-129 mg/dL   Borderline High:  130-159 mg/dL   High:  160-189 mg/dL   Very High:  >= 190 mg/dL    Non HDL Cholesterol  Desirable:  130 mg/dL  Above Desirable:  130-159 mg/dL  Borderline High:  160-189 mg/dL  High:  190-219 mg/dL  Very High:  Greater than or equal to 220 mg/dL       If you have any questions or concerns, please call the clinic at the number listed above.       Sincerely,      Demetrice Moseley MD

## 2023-08-08 NOTE — PROGRESS NOTES
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  Plan: CBC with platelets, ESR: Erythrocyte         sedimentation rate            (E66.01) Morbid obesity (H)  Comment: Discussed diet modification       (I10) Benign essential hypertension  Comment:  Recommend to add additional lisinopril   Patient will return for blood pressure check .  Plan: Comprehensive metabolic panel (BMP + Alb, Alk         Phos, ALT, AST, Total. Bili, TP), metoprolol         succinate ER (TOPROL XL) 50 MG 24 hr tablet,         lisinopril (ZESTRIL) 10 MG tablet            (R06.09) BURR (dyspnea on exertion)  Comment: has noticed improvement with albuterol .    Plan: albuterol (PROAIR HFA/PROVENTIL HFA/VENTOLIN         HFA) 108 (90 Base) MCG/ACT inhaler         Plan: albuterol (PROAIR HFA/PROVENTIL HFA/VENTOLIN         HFA) 108 (90 Base) MCG/ACT inhaler      (H10.33) Acute conjunctivitis of both eyes, unspecified acute conjunctivitis type  Comment:  Plan: ciprofloxacin (CILOXAN) 0.3 % ophthalmic         solution            (J45.20) Mild intermittent asthma without complication  Comment:   Plan: General PFT Lab (Please always keep checked),         Pulmonary Function Test          (Z13.220) Lipid screening  Comment:   Plan: Lipid panel reflex to direct LDL Fasting               BMI:   Estimated body mass index is 49.05 kg/m  as calculated from the following:    Height as of this encounter: 1.727 m (5' 8\").    Weight as of this encounter: 146.3 kg (322 lb 9.6 oz).   Weight management plan: Discussed healthy diet and exercise guidelines      Demetrice Moseley MD  [facility]    Leah Wetzel is a 52 year old, presenting for the following health issues:  Eye Problem (Left eye swelling.), LAB REQUEST, and Anxiety        8/8/2023     3:16 PM   Additional Questions   Roomed by Cici MACKEY       History of Present Illness " "      Hypertension: He presents for follow up of hypertension.  He does not check blood pressure  regularly outside of the clinic. Outpatient blood pressures have not been over 140/90. He does not follow a low salt diet.     Reason for visit:  Blood pressures check and blood eork.    He eats 0-1 servings of fruits and vegetables daily.He consumes 2 sweetened beverage(s) daily.He exercises with enough effort to increase his heart rate 9 or less minutes per day.  He exercises with enough effort to increase his heart rate 3 or less days per week.   He is taking medications regularly.           Review of Systems   Eyes:  Positive for pain.   Cardiovascular:  Negative for chest pain.   Gastrointestinal:  Negative for abdominal distention and abdominal pain.   Genitourinary:  Negative for difficulty urinating and dysuria.   Musculoskeletal:  Negative for arthralgias and back pain.   Neurological:  Negative for dizziness.   Psychiatric/Behavioral:  Positive for behavioral problems. The patient is nervous/anxious.             Objective    BP (!) 142/100 (BP Location: Right arm, Patient Position: Sitting, Cuff Size: Adult Large)   Pulse 65   Temp 97.9  F (36.6  C) (Oral)   Resp 18   Ht 1.727 m (5' 8\")   Wt 146.3 kg (322 lb 9.6 oz)   SpO2 98%   BMI 49.05 kg/m    Body mass index is 49.05 kg/m .  Physical Exam  Cardiovascular:      Rate and Rhythm: Normal rate.   Pulmonary:      Effort: Pulmonary effort is normal.   Abdominal:      General: Abdomen is flat.   Musculoskeletal:         General: Normal range of motion.   Skin:     General: Skin is warm.   Neurological:      General: No focal deficit present.   Psychiatric:         Mood and Affect: Mood normal.         Behavior: Behavior normal.                      "

## 2023-08-09 LAB
ALBUMIN SERPL BCG-MCNC: 4.4 G/DL (ref 3.5–5.2)
ALP SERPL-CCNC: 72 U/L (ref 40–129)
ALT SERPL W P-5'-P-CCNC: 86 U/L (ref 0–70)
ANION GAP SERPL CALCULATED.3IONS-SCNC: 10 MMOL/L (ref 7–15)
AST SERPL W P-5'-P-CCNC: 56 U/L (ref 0–45)
BILIRUB SERPL-MCNC: 0.7 MG/DL
BUN SERPL-MCNC: 12.4 MG/DL (ref 6–20)
CALCIUM SERPL-MCNC: 9.4 MG/DL (ref 8.6–10)
CHLORIDE SERPL-SCNC: 103 MMOL/L (ref 98–107)
CHOLEST SERPL-MCNC: 157 MG/DL
CREAT SERPL-MCNC: 0.73 MG/DL (ref 0.67–1.17)
DEPRECATED HCO3 PLAS-SCNC: 27 MMOL/L (ref 22–29)
GFR SERPL CREATININE-BSD FRML MDRD: >90 ML/MIN/1.73M2
GLUCOSE SERPL-MCNC: 86 MG/DL (ref 70–99)
HDLC SERPL-MCNC: 50 MG/DL
LDLC SERPL CALC-MCNC: 92 MG/DL
NONHDLC SERPL-MCNC: 107 MG/DL
POTASSIUM SERPL-SCNC: 4.7 MMOL/L (ref 3.4–5.3)
PROT SERPL-MCNC: 7.4 G/DL (ref 6.4–8.3)
SODIUM SERPL-SCNC: 140 MMOL/L (ref 136–145)
TRIGL SERPL-MCNC: 74 MG/DL

## 2023-08-14 DIAGNOSIS — R06.02 SHORTNESS OF BREATH: Primary | ICD-10-CM

## 2023-08-14 NOTE — LETTER
August 14, 2023  Re: Mike Schafer  YOB: 1970    Dear Colleague,    Thank you for your referral to the [facility] [place]. Please send through a new Request for a PFT.  The order placed does not have enough information to schedule a PFT with the Patient.      If you have any questions or concerns, please contact our office at Dept: [phone].        Sincerely,  [facility] [place]

## 2023-08-28 ENCOUNTER — HOSPITAL ENCOUNTER (OUTPATIENT)
Dept: RESPIRATORY THERAPY | Facility: CLINIC | Age: 53
Discharge: HOME | End: 2023-08-28
Attending: FAMILY MEDICINE | Admitting: FAMILY MEDICINE
Payer: COMMERCIAL

## 2023-08-28 DIAGNOSIS — R06.02 SHORTNESS OF BREATH: ICD-10-CM

## 2023-08-28 PROCEDURE — 94729 DIFFUSING CAPACITY: CPT

## 2023-08-28 PROCEDURE — 94010 BREATHING CAPACITY TEST: CPT

## 2023-08-28 PROCEDURE — 94726 PLETHYSMOGRAPHY LUNG VOLUMES: CPT

## 2023-08-28 PROCEDURE — 999N000157 HC STATISTIC RCP TIME EA 10 MIN

## 2023-08-28 NOTE — PROGRESS NOTES
PFT Note:        Pt completed pulmonary function testing with DLCO. Good Pt effort and cooperation.  All testing meets ATS recommendations.  DLCO is an average of 2 maneuvers.  Predicted DLCO is corrected for a Hgb of 14.6 drawn on 8/8/2023.     August 28, 2023.3:07 PM  Domenico Hunt, RT

## 2023-08-29 LAB
DLCOCOR-%PRED-PRE: 120 %
DLCOCOR-PRE: 32.8 ML/MIN/MMHG
DLCOUNC-%PRED-PRE: 120 %
DLCOUNC-PRE: 32.8 ML/MIN/MMHG
DLCOUNC-PRED: 27.19 ML/MIN/MMHG
ERV-%PRED-PRE: 22 %
ERV-PRE: 0.28 L
ERV-PRED: 1.29 L
EXPTIME-PRE: 6.54 SEC
FEF2575-%PRED-PRE: 93 %
FEF2575-PRE: 2.89 L/SEC
FEF2575-PRED: 3.07 L/SEC
FEFMAX-%PRED-PRE: 93 %
FEFMAX-PRE: 8.62 L/SEC
FEFMAX-PRED: 9.23 L/SEC
FEV1-%PRED-PRE: 93 %
FEV1-PRE: 3.14 L
FEV1FEV6-PRE: 80 %
FEV1FEV6-PRED: 80 %
FEV1FVC-PRE: 80 %
FEV1FVC-PRED: 80 %
FEV1SVC-PRE: 80 %
FEV1SVC-PRED: 68 %
FIFMAX-PRE: 4.99 L/SEC
FRCPLETH-%PRED-PRE: 73 %
FRCPLETH-PRE: 2.39 L
FRCPLETH-PRED: 3.24 L
FVC-%PRED-PRE: 93 %
FVC-PRE: 3.93 L
FVC-PRED: 4.2 L
IC-%PRED-PRE: 102 %
IC-PRE: 3.64 L
IC-PRED: 3.56 L
RVPLETH-%PRED-PRE: 114 %
RVPLETH-PRE: 2.11 L
RVPLETH-PRED: 1.84 L
TLCPLETH-%PRED-PRE: 89 %
TLCPLETH-PRE: 6.03 L
TLCPLETH-PRED: 6.75 L
VA-%PRED-PRE: 92 %
VA-PRE: 5.72 L
VC-%PRED-PRE: 79 %
VC-PRE: 3.92 L
VC-PRED: 4.96 L

## 2023-08-31 ENCOUNTER — MYC MEDICAL ADVICE (OUTPATIENT)
Dept: FAMILY MEDICINE | Facility: CLINIC | Age: 53
End: 2023-08-31
Payer: COMMERCIAL

## 2023-08-31 ENCOUNTER — TELEPHONE (OUTPATIENT)
Dept: FAMILY MEDICINE | Facility: CLINIC | Age: 53
End: 2023-08-31
Payer: COMMERCIAL

## 2023-08-31 NOTE — TELEPHONE ENCOUNTER
----- Message from Demetrice Moseley MD sent at 8/31/2023 10:47 AM CDT -----  Team     Please inform patient his PFT is normal .  No cause identified for shortness of breath .  Reviewing his stress est , concentric left ventricular hypertrophy .   Recommend to control bp Better .   Can arrange nurse only bp Check visit or can document if he is checking at home .    Demetrice Moseley MD.

## 2023-09-06 NOTE — TELEPHONE ENCOUNTER
Patient calls back went over recommendations from Dr. Moseley. Went over his diet and exercise. Dry cough has largely gone recently. Uses Albuterol occasionally, and that helps.  Is looking at getting a CPAP.   He has noticed he has been feeling better since starting Metoprolol.

## 2023-09-19 ENCOUNTER — ALLIED HEALTH/NURSE VISIT (OUTPATIENT)
Dept: FAMILY MEDICINE | Facility: CLINIC | Age: 53
End: 2023-09-19
Payer: COMMERCIAL

## 2023-09-19 VITALS — SYSTOLIC BLOOD PRESSURE: 135 MMHG | DIASTOLIC BLOOD PRESSURE: 89 MMHG

## 2023-09-19 DIAGNOSIS — I10 BENIGN ESSENTIAL HYPERTENSION: Primary | ICD-10-CM

## 2023-09-19 PROCEDURE — 99207 PR NO CHARGE NURSE ONLY: CPT

## 2023-09-20 NOTE — PROGRESS NOTES
Call [phone] to schedule breast US. Please check with patient .  Lisinopril can cause cough can not be explained by any other reason .  If symptoms persistent we can change the medication to losartan .  If symptoms intermittent likely not from the medication , we can monitor and recommend to contact if symptoms fail to improve .    Thanks   Demetrice

## 2023-09-20 NOTE — PROGRESS NOTES
Call placed 9/20/23 10:25 a.m.Call to Rashard per Dr. Moseley request. Left message to return call to clinic. Alpa Key R.N.

## 2023-12-07 SDOH — HEALTH STABILITY: PHYSICAL HEALTH: ON AVERAGE, HOW MANY MINUTES DO YOU ENGAGE IN EXERCISE AT THIS LEVEL?: 60 MIN

## 2023-12-07 SDOH — HEALTH STABILITY: PHYSICAL HEALTH: ON AVERAGE, HOW MANY DAYS PER WEEK DO YOU ENGAGE IN MODERATE TO STRENUOUS EXERCISE (LIKE A BRISK WALK)?: 5 DAYS

## 2023-12-07 ASSESSMENT — LIFESTYLE VARIABLES
HOW OFTEN DO YOU HAVE A DRINK CONTAINING ALCOHOL: NEVER
AUDIT-C TOTAL SCORE: 0
HOW OFTEN DO YOU HAVE SIX OR MORE DRINKS ON ONE OCCASION: NEVER
HOW MANY STANDARD DRINKS CONTAINING ALCOHOL DO YOU HAVE ON A TYPICAL DAY: PATIENT DOES NOT DRINK
SKIP TO QUESTIONS 9-10: 1

## 2023-12-07 ASSESSMENT — SOCIAL DETERMINANTS OF HEALTH (SDOH)
DO YOU BELONG TO ANY CLUBS OR ORGANIZATIONS SUCH AS CHURCH GROUPS UNIONS, FRATERNAL OR ATHLETIC GROUPS, OR SCHOOL GROUPS?: YES
HOW OFTEN DO YOU ATTEND CHURCH OR RELIGIOUS SERVICES?: PATIENT DECLINED
IN A TYPICAL WEEK, HOW MANY TIMES DO YOU TALK ON THE PHONE WITH FAMILY, FRIENDS, OR NEIGHBORS?: MORE THAN THREE TIMES A WEEK
HOW OFTEN DO YOU GET TOGETHER WITH FRIENDS OR RELATIVES?: TWICE A WEEK
HOW OFTEN DO YOU ATTENT MEETINGS OF THE CLUB OR ORGANIZATION YOU BELONG TO?: MORE THAN 4 TIMES PER YEAR

## 2023-12-07 ASSESSMENT — ASTHMA QUESTIONNAIRES: ACT_TOTALSCORE: 22

## 2023-12-13 ENCOUNTER — OFFICE VISIT (OUTPATIENT)
Dept: FAMILY MEDICINE | Facility: CLINIC | Age: 53
End: 2023-12-13
Payer: COMMERCIAL

## 2023-12-13 VITALS
HEART RATE: 64 BPM | HEIGHT: 68 IN | OXYGEN SATURATION: 96 % | WEIGHT: 313 LBS | BODY MASS INDEX: 47.44 KG/M2 | RESPIRATION RATE: 20 BRPM | SYSTOLIC BLOOD PRESSURE: 140 MMHG | TEMPERATURE: 98.6 F | DIASTOLIC BLOOD PRESSURE: 92 MMHG

## 2023-12-13 DIAGNOSIS — M94.262 CHONDROMALACIA OF KNEE, LEFT: ICD-10-CM

## 2023-12-13 DIAGNOSIS — G47.33 OSA (OBSTRUCTIVE SLEEP APNEA): ICD-10-CM

## 2023-12-13 DIAGNOSIS — Z01.818 PREOP GENERAL PHYSICAL EXAM: Primary | ICD-10-CM

## 2023-12-13 DIAGNOSIS — I10 PRIMARY HYPERTENSION: ICD-10-CM

## 2023-12-13 DIAGNOSIS — S83.242D TEAR OF MEDIAL MENISCUS OF LEFT KNEE, CURRENT, UNSPECIFIED TEAR TYPE, SUBSEQUENT ENCOUNTER: ICD-10-CM

## 2023-12-13 PROBLEM — S83.242A TEAR OF MEDIAL MENISCUS OF LEFT KNEE, CURRENT: Status: ACTIVE | Noted: 2023-11-20

## 2023-12-13 PROBLEM — K21.9 GASTROESOPHAGEAL REFLUX DISEASE WITHOUT ESOPHAGITIS: Status: ACTIVE | Noted: 2022-02-17

## 2023-12-13 PROCEDURE — 99214 OFFICE O/P EST MOD 30 MIN: CPT | Mod: 25 | Performed by: NURSE PRACTITIONER

## 2023-12-13 PROCEDURE — 93000 ELECTROCARDIOGRAM COMPLETE: CPT | Performed by: NURSE PRACTITIONER

## 2023-12-13 ASSESSMENT — PAIN SCALES - GENERAL: PAINLEVEL: NO PAIN (0)

## 2023-12-13 NOTE — PATIENT INSTRUCTIONS
Preparing for Your Surgery  Getting started  A nurse will call you to review your health history and instructions. They will give you an arrival time based on your scheduled surgery time. Please be ready to share:  Your doctor's clinic name and phone number  Your medical, surgical, and anesthesia history  A list of allergies and sensitivities  A list of medicines, including herbal treatments and over-the-counter drugs  Whether the patient has a legal guardian (ask how to send us the papers in advance)  Please tell us if you're pregnant--or if there's any chance you might be pregnant. Some surgeries may injure a fetus (unborn baby), so they require a pregnancy test. Surgeries that are safe for a fetus don't always need a test, and you can choose whether to have one.   If you have a child who's having surgery, please ask for a copy of Preparing for Your Child's Surgery.    Preparing for surgery  Within 10 to 30 days of surgery: Have a pre-op exam (sometimes called an H&P, or History and Physical). This can be done at a clinic or pre-operative center.  If you're having a , you may not need this exam. Talk to your care team.  At your pre-op exam, talk to your care team about all medicines you take. If you need to stop any medicines before surgery, ask when to start taking them again.  We do this for your safety. Many medicines can make you bleed too much during surgery. Some change how well surgery (anesthesia) drugs work.  Call your insurance company to let them know you're having surgery. (If you don't have insurance, call [phone].)  Call your clinic if there's any change in your health. This includes signs of a cold or flu (sore throat, runny nose, cough, rash, fever). It also includes a scrape or scratch near the surgery site.  If you have questions on the day of surgery, call your hospital or surgery center.  Eating and drinking guidelines  For your safety: Unless your surgeon tells you otherwise,  follow the guidelines below.  Eat and drink as usual until 8 hours before you arrive for surgery. After that, no food or milk.  Drink clear liquids until 2 hours before you arrive. These are liquids you can see through, like water, Gatorade, and Propel Water. They also include plain black coffee and tea (no cream or milk), candy, and breath mints. You can spit out gum when you arrive.  If you drink alcohol: Stop drinking it the night before surgery.  If your care team tells you to take medicine on the morning of surgery, it's okay to take it with a sip of water.  Preventing infection  Shower or bathe the night before and morning of your surgery. Follow the instructions your clinic gave you. (If no instructions, use regular soap.)  Don't shave or clip hair near your surgery site. We'll remove the hair if needed.  Don't smoke or vape the morning of surgery. You may chew nicotine gum up to 2 hours before surgery. A nicotine patch is okay.  Note: Some surgeries require you to completely quit smoking and nicotine. Check with your surgeon.  Your care team will make every effort to keep you safe from infection. We will:  Clean our hands often with soap and water (or an alcohol-based hand rub).  Clean the skin at your surgery site with a special soap that kills germs.  Give you a special gown to keep you warm. (Cold raises the risk of infection.)  Wear special hair covers, masks, gowns and gloves during surgery.  Give antibiotic medicine, if prescribed. Not all surgeries need antibiotics.  What to bring on the day of surgery  Photo ID and insurance card  Copy of your health care directive, if you have one  Glasses and hearing aids (bring cases)  You can't wear contacts during surgery  Inhaler and eye drops, if you use them (tell us about these when you arrive)  CPAP machine or breathing device, if you use them  A few personal items, if spending the night  If you have . . .  A pacemaker, ICD (cardiac defibrillator) or other  implant: Bring the ID card.  An implanted stimulator: Bring the remote control.  A legal guardian: Bring a copy of the certified (court-stamped) guardianship papers.  Please remove any jewelry, including body piercings. Leave jewelry and other valuables at home.  If you're going home the day of surgery  You must have a responsible adult drive you home. They should stay with you overnight as well.  If you don't have someone to stay with you, and you aren't safe to go home alone, we may keep you overnight. Insurance often won't pay for this.  After surgery  If it's hard to control your pain or you need more pain medicine, please call your surgeon's office.  Questions?   If you have any questions for your care team, list them here: _________________________________________________________________________________________________________________________________________________________________________ ____________________________________ ____________________________________ ____________________________________  For informational purposes only. Not to replace the advice of your health care provider. Copyright   2003, 2019 [employer]. All rights reserved. Clinically reviewed by Penny Liang MD. [employer] 946086 - REV 12/22.    How to Take Your Medication Before Surgery  - HOLD (do not take) lisinopril the night before surgery

## 2023-12-13 NOTE — PROGRESS NOTES
[facility]  [address] [address]  Phone: [phone]  Primary Provider: Yuval Evans  Pre-op Performing Provider: NAPOLEON DANGELO      PREOPERATIVE EVALUATION:  Today's date: 12/13/2023    Rashard is a 53 year old, presenting for the following:  Pre-Op Exam (Left Knee Arthroscopic/Meniscus Trimming,  DOS: 12/14/2023, Dr Henrry Love, [facility].)        12/13/2023     1:42 PM   Additional Questions   Roomed by Yudelka Amos CMA   Accompanied by Self       Surgical Information:  Surgery/Procedure: Left Knee Repair  Surgery Location: [facility]  Surgeon: Dr Henrry Love  Surgery Date: 12/14/2023  Time of Surgery: 1:00pm  Where patient plans to recover: At home with family  Fax number for surgical facility: Note does not need to be faxed, will be available electronically in Epic.    Assessment & Plan     The proposed surgical procedure is considered INTERMEDIATE risk.    Preop general physical exam  - EKG normal, cleared for procedure  - EKG 12-lead complete w/read - Clinics    Tear of medial meniscus of left knee, current, unspecified tear type, subsequent encounter  - cleared for procedure    Chondromalacia of knee, left  Cleared for procedure    Primary hypertension  On medication, controlled, stable.     GIACOMO (obstructive sleep apnea)  stable     - No identified additional risk factors other than previously addressed    Antiplatelet or Anticoagulation Medication Instructions:   - Patient is on no antiplatelet or anticoagulation medications.    Additional Medication Instructions:   - ACE/ARB: HOLD on day of surgery (minimum 11 hours for general anesthesia).    RECOMMENDATION:  APPROVAL GIVEN to proceed with proposed procedure, without further diagnostic evaluation.    Subjective       HPI related to upcoming procedure: Here for pre op for knee surgery, he does have CPAP but was mild and he is not on therapy for GIACOMO. He does have HTN and it is controlled with  his BP medication but he has not taken it, he over all feels well. He is a non smoker, non diabetic. Family history of cardiovascular disease in father.         12/7/2023    11:26 PM   Preop Questions   1. Have you ever had a heart attack or stroke? No   2. Have you ever had surgery on your heart or blood vessels, such as a stent placement, a coronary artery bypass, or surgery on an artery in your head, neck, heart, or legs? No   3. Do you have chest pain with activity? No   4. Do you have a history of  heart failure? No   5. Do you currently have a cold, bronchitis or symptoms of other infection? No   6. Do you have a cough, shortness of breath, or wheezing? No   7. Do you or anyone in your family have previous history of blood clots? No   8. Do you or does anyone in your family have a serious bleeding problem such as prolonged bleeding following surgeries or cuts? No   9. Have you ever had problems with anemia or been told to take iron pills? No   10. Have you had any abnormal blood loss such as black, tarry or bloody stools? No   11. Have you ever had a blood transfusion? No   12. Are you willing to have a blood transfusion if it is medically needed before, during, or after your surgery? Yes   13. Have you or any of your relatives ever had problems with anesthesia? No   14. Do you have sleep apnea, excessive snoring or daytime drowsiness? YES - GIACOMO no on sleep medicine.    14a. Do you have a CPAP machine? No   15. Do you have any artifical heart valves or other implanted medical devices like a pacemaker, defibrillator, or continuous glucose monitor? No   16. Do you have artificial joints? No   17. Are you allergic to latex? No       Health Care Directive:  Patient does not have a Health Care Directive or Living Will: Discussed advance care planning with patient; however, patient declined at this time.    Preoperative Review of :   reviewed - no record of controlled substances prescribed.      Status of  Chronic Conditions:  HYPERTENSION - Patient has longstanding history of HTN , currently denies any symptoms referable to elevated blood pressure. Specifically denies chest pain, palpitations, dyspnea, orthopnea, PND or peripheral edema. Blood pressure readings have been in normal range. Current medication regimen is as listed below. Patient denies any side effects of medication.     Review of Systems  Constitutional, neuro, ENT, endocrine, pulmonary, cardiac, gastrointestinal, genitourinary, musculoskeletal, integument and psychiatric systems are negative, except as otherwise noted.    Patient Active Problem List    Diagnosis Date Noted    Leukocytoclastic vasculitis (H) 08/08/2023     Priority: Medium    GIACOMO (obstructive sleep apnea) 07/21/2021     Priority: Medium     7/19/2021 [facility] Sleep Study (325.0 lbs) - AHI 20.7, RDI 33.1, Supine AHI 37.8, REM AHI 25.8, Low O2 86.7%, Time Spent ?88% 0.3 minutes / Time Spent ?89% 1.0 minutes.      Morbid obesity (H) 05/20/2021     Priority: Medium    Benign essential hypertension 07/12/2016     Priority: Medium      Past Medical History:   Diagnosis Date    Hypertension      Past Surgical History:   Procedure Laterality Date    BIOPSY  November 2021    SOFT TISSUE SURGERY  03/17/2020     Current Outpatient Medications   Medication Sig Dispense Refill    albuterol (PROAIR HFA/PROVENTIL HFA/VENTOLIN HFA) 108 (90 Base) MCG/ACT inhaler Inhale 2 puffs into the lungs every 6 hours 8.5 g 0    lisinopril (ZESTRIL) 10 MG tablet Take 1 tablet (10 mg) by mouth daily 90 tablet 3    metoprolol succinate ER (TOPROL XL) 50 MG 24 hr tablet Take 1 tablet (50 mg) by mouth daily 90 tablet 3       No Known Allergies     Social History     Tobacco Use    Smoking status: Never    Smokeless tobacco: Never   Substance Use Topics    Alcohol use: Not Currently     Family History   Problem Relation Age of Onset    Breast Cancer Mother     Diabetes Father     Other Cancer Maternal  "Grandmother     Other Cancer Paternal Grandfather      History   Drug Use No         Objective     BP (!) 140/92 (BP Location: Right arm, Patient Position: Sitting, Cuff Size: Adult Large)   Pulse 64   Temp 98.6  F (37  C) (Oral)   Resp 20   Ht 1.727 m (5' 8\")   Wt 142 kg (313 lb)   SpO2 96%   BMI 47.59 kg/m      Physical Exam    GENERAL APPEARANCE: healthy, alert and no distress     EYES: EOMI,  PERRL     HENT: ear canals and TM's normal and nose and mouth without ulcers or lesions     NECK: no adenopathy, no asymmetry, masses, or scars and thyroid normal to palpation     RESP: lungs clear to auscultation - no rales, rhonchi or wheezes     CV: regular rates and rhythm, normal S1 S2, no S3 or S4 and no murmur, click or rub     ABDOMEN:  soft, nontender, no HSM or masses and bowel sounds normal     MS: extremities normal- no gross deformities noted, no evidence of inflammation in joints, FROM in all extremities.     SKIN: no suspicious lesions or rashes     NEURO: Normal strength and tone, sensory exam grossly normal, mentation intact and speech normal     PSYCH: mentation appears normal. and affect normal/bright     LYMPHATICS: No cervical adenopathy    Recent Labs   Lab Test 08/08/23  1550 02/16/22  1640   HGB 14.6 15.0    336    137   POTASSIUM 4.7 4.4   CR 0.73 0.78        Diagnostics:  No labs were ordered during this visit.   EKG: appears normal, NSR, normal axis, normal intervals, no acute ST/T changes c/w ischemia, no LVH by voltage criteria, unchanged from previous tracings    Revised Cardiac Risk Index (RCRI):  The patient has the following serious cardiovascular risks for perioperative complications:   - No serious cardiac risks = 0 points     RCRI Interpretation: 0 points: Class I (very low risk - 0.4% complication rate)         Signed Electronically by: Carol Moore NP  Copy of this evaluation report is provided to requesting physician.      "

## 2024-02-04 ENCOUNTER — HEALTH MAINTENANCE LETTER (OUTPATIENT)
Age: 54
End: 2024-02-04

## 2024-02-04 ENCOUNTER — MYC MEDICAL ADVICE (OUTPATIENT)
Dept: FAMILY MEDICINE | Facility: CLINIC | Age: 54
End: 2024-02-04
Payer: COMMERCIAL

## 2024-02-04 DIAGNOSIS — Z82.49 FAMILY HISTORY OF CARDIOVASCULAR DISEASE: Primary | ICD-10-CM

## 2024-02-26 ENCOUNTER — MYC MEDICAL ADVICE (OUTPATIENT)
Dept: FAMILY MEDICINE | Facility: CLINIC | Age: 54
End: 2024-02-26
Payer: COMMERCIAL

## 2024-02-26 NOTE — TELEPHONE ENCOUNTER
Patient Quality Outreach    Patient is due for the following:   Colon Cancer Screening    Next Steps:   No follow up needed at this time.    Type of outreach:    Sent [employer] message.    Next Steps:  Reach out within 90 days via [employer].    Max number of attempts reached: No. Will try again in 90 days if patient still on fail list.    Questions for provider review:    None           Janessa Amos, ROLY  Chart routed to Care Team.

## 2024-03-21 SDOH — HEALTH STABILITY: PHYSICAL HEALTH: ON AVERAGE, HOW MANY DAYS PER WEEK DO YOU ENGAGE IN MODERATE TO STRENUOUS EXERCISE (LIKE A BRISK WALK)?: 5 DAYS

## 2024-03-21 ASSESSMENT — SOCIAL DETERMINANTS OF HEALTH (SDOH): HOW OFTEN DO YOU GET TOGETHER WITH FRIENDS OR RELATIVES?: ONCE A WEEK

## 2024-03-26 ENCOUNTER — HOSPITAL ENCOUNTER (OUTPATIENT)
Dept: CT IMAGING | Facility: CLINIC | Age: 54
Discharge: HOME | End: 2024-03-26
Attending: NURSE PRACTITIONER | Admitting: NURSE PRACTITIONER
Payer: COMMERCIAL

## 2024-03-26 DIAGNOSIS — Z82.49 FAMILY HISTORY OF CARDIOVASCULAR DISEASE: ICD-10-CM

## 2024-03-26 PROCEDURE — 75571 CT HRT W/O DYE W/CA TEST: CPT | Mod: 26 | Performed by: INTERNAL MEDICINE

## 2024-03-26 PROCEDURE — 75571 CT HRT W/O DYE W/CA TEST: CPT

## 2024-03-27 ENCOUNTER — OFFICE VISIT (OUTPATIENT)
Dept: FAMILY MEDICINE | Facility: CLINIC | Age: 54
End: 2024-03-27
Payer: COMMERCIAL

## 2024-03-27 VITALS
HEART RATE: 58 BPM | SYSTOLIC BLOOD PRESSURE: 158 MMHG | RESPIRATION RATE: 14 BRPM | BODY MASS INDEX: 47.74 KG/M2 | WEIGHT: 315 LBS | OXYGEN SATURATION: 99 % | TEMPERATURE: 98 F | HEIGHT: 68 IN | DIASTOLIC BLOOD PRESSURE: 95 MMHG

## 2024-03-27 DIAGNOSIS — I10 PRIMARY HYPERTENSION: ICD-10-CM

## 2024-03-27 DIAGNOSIS — E66.01 MORBID OBESITY (H): ICD-10-CM

## 2024-03-27 DIAGNOSIS — J45.20 MILD INTERMITTENT ASTHMA WITHOUT COMPLICATION: ICD-10-CM

## 2024-03-27 DIAGNOSIS — N52.9 ERECTILE DYSFUNCTION, UNSPECIFIED ERECTILE DYSFUNCTION TYPE: ICD-10-CM

## 2024-03-27 DIAGNOSIS — Z00.00 ROUTINE GENERAL MEDICAL EXAMINATION AT A HEALTH CARE FACILITY: Primary | ICD-10-CM

## 2024-03-27 PROCEDURE — 99396 PREV VISIT EST AGE 40-64: CPT | Mod: 25 | Performed by: FAMILY MEDICINE

## 2024-03-27 PROCEDURE — 90471 IMMUNIZATION ADMIN: CPT | Performed by: FAMILY MEDICINE

## 2024-03-27 PROCEDURE — 90750 HZV VACC RECOMBINANT IM: CPT | Performed by: FAMILY MEDICINE

## 2024-03-27 PROCEDURE — 99214 OFFICE O/P EST MOD 30 MIN: CPT | Mod: 25 | Performed by: FAMILY MEDICINE

## 2024-03-27 RX ORDER — ALBUTEROL SULFATE 90 UG/1
2 AEROSOL, METERED RESPIRATORY (INHALATION) EVERY 6 HOURS
Qty: 8.5 G | Refills: 3 | Status: SHIPPED | OUTPATIENT
Start: 2024-03-27

## 2024-03-27 RX ORDER — AMLODIPINE BESYLATE 5 MG/1
5 TABLET ORAL DAILY
Qty: 90 TABLET | Refills: 0 | Status: SHIPPED | OUTPATIENT
Start: 2024-03-27 | End: 2024-06-10 | Stop reason: DRUGHIGH

## 2024-03-27 NOTE — PROGRESS NOTES
"Preventive Care Visit  [facility]  Yuval Evans DO, Family Medicine  Mar 27, 2024      Assessment & Plan   See after visit summary and result note for helpful information and advice given to patient.    Routine general medical examination at a health care facility    Morbid obesity (H)    Mild intermittent asthma without complication    - albuterol (PROAIR HFA/PROVENTIL HFA/VENTOLIN HFA) 108 (90 Base) MCG/ACT inhaler  Dispense: 8.5 g; Refill: 3    Erectile dysfunction, unspecified erectile dysfunction type    - Testosterone, total    Primary hypertension    - amLODIPine (NORVASC) 5 MG tablet  Dispense: 90 tablet; Refill: 0                BMI  Estimated body mass index is 48.2 kg/m  as calculated from the following:    Height as of this encounter: 1.727 m (5' 8\").    Weight as of this encounter: 143.8 kg (317 lb).       Counseling  Appropriate preventive services were discussed with this patient, including applicable screening as appropriate for fall prevention, nutrition, physical activity, Tobacco-use cessation, weight loss and cognition.  Checklist reviewing preventive services available has been given to the patient.          Leah Wetzel is a 53 year old, presenting for the following:  Physical        3/27/2024     1:39 PM   Additional Questions   Roomed by Don Pate   Accompanied by self       Health Care Directive  Patient does not have a Health Care Directive or Living Will: Discussed advance care planning with patient; information given to patient to review.    HPI  Patient is here for primary reason of preventive health exam.              3/21/2024   General Health   How would you rate your overall physical health? (!) POOR   Feel stress (tense, anxious, or unable to sleep) Only a little   (!) STRESS CONCERN      3/21/2024   Nutrition   Three or more servings of calcium each day? (!) NO   Diet: Regular (no restrictions)   How many servings of fruit and vegetables per day? " (!) 0-1   How many sweetened beverages each day? (!) 4+         3/21/2024   Exercise   Days per week of moderate/strenous exercise 5 days         3/21/2024   Social Factors   Frequency of gathering with friends or relatives Once a week   Worry food won't last until get money to buy more No   Food not last or not have enough money for food? No   Do you have housing?  Yes   Are you worried about losing your housing? No   Lack of transportation? No   Unable to get utilities (heat,electricity)? No         3/21/2024   Fall Risk   Fallen 2 or more times in the past year? No   Trouble with walking or balance? Yes           3/21/2024   Dental   Dentist two times every year? Yes         3/21/2024   TB Screening   Were you born outside of the [place]? No         Today's PHQ-2 Score:       3/27/2024     1:35 PM   PHQ-2 ( 1999 [employer])   Q1: Little interest or pleasure in doing things 0   Q2: Feeling down, depressed or hopeless 0   PHQ-2 Score 0   Q1: Little interest or pleasure in doing things Not at all   Q2: Feeling down, depressed or hopeless Not at all   PHQ-2 Score 0           3/21/2024   Substance Use   Alcohol more than 3/day or more than 7/wk No   Do you use any other substances recreationally? (!) CANNABIS PRODUCTS     Social History     Tobacco Use    Smoking status: Never    Smokeless tobacco: Never   Vaping Use    Vaping Use: Never used   Substance Use Topics    Alcohol use: Not Currently    Drug use: No           3/21/2024   STI Screening   New sexual partner(s) since last STI/HIV test? No   ASCVD Risk   The 10-year ASCVD risk score (Farhan GUNTER, et al., 2019) is: 4.7%    Values used to calculate the score:      Age: 53 years      Sex: Male      Is Non- : No      Diabetic: No      Tobacco smoker: No      Systolic Blood Pressure: 140 mmHg      Is BP treated: Yes      HDL Cholesterol: 50 mg/dL      Total Cholesterol: 157 mg/dL           Reviewed and updated as needed this visit by Provider    "                       Review of Systems  Constitutional, neuro, ENT, endocrine, pulmonary, cardiac, gastrointestinal, genitourinary, musculoskeletal, integument and psychiatric systems are negative, except as otherwise noted.    Patient has had a cough with taking lisinopril, so will change medication. He last took this medication in late February.     Wants to check his testosterone level, with trouble keeping an erection. Viagra has previously helped. His ED concerns started about the same time as when he started treatment with metoprolol. Will stop this medication because of ED concerns.     Will start amlodipine treatment as single medication treatment for hypertension.     Uses albuterol about every 2 weeks with relief of chest tightness.        Objective    Exam  There were no vitals taken for this visit.   Estimated body mass index is 47.59 kg/m  as calculated from the following:    Height as of 12/13/23: 1.727 m (5' 8\").    Weight as of 12/13/23: 142 kg (313 lb).    Physical Exam  General: Vital signs reviewed.  Patient is in no acute appearing distress.  Breathing appears nonlabored.  Patient is alert and oriented ×3.      ENT: Ear exam shows bilateral tympanic membranes to be clear without injection, nasal turbinates show no injection or edema, no pharyngeal injection or exudate.    Neck: supple with no adenoapthy, palpable abnormal masses, or thyroid abnormality.    Eyes: No scleral, lid, or periorbital injection or edema noted.  No eye mattering noted.  Corneas are clear. Pupils are equal round and reactive to light with normal consensual eye movement.    Heart: Heart rate is regular without murmur.    Lungs: Lungs are clear to auscultation with good airflow bilaterally.    Abdomen:  Abdomen is soft, nontender.  No palpable abnormal masses or organomegaly.  Bowel sounds are normal.    Genital exam: Patient declined exam for possible hernia.    Back: No areas of tenderness.    Skin: Warm and dry, with no " rash or abnormal lesions noted.    Extremities: No lower leg edema noted.  No joint edema or restricted range of motion noted.    Neuro: No acute focal deficits or other abnormalities noted.    Psych: Patient is very pleasant, making good eye contact, with clear and fluent speech.  Answers questions appropriately. No psychomotor agitation.     Signed Electronically by: Yuval Evans DO

## 2024-03-27 NOTE — PATIENT INSTRUCTIONS
Preventive Care Advice   This is general advice given by our system to help you stay healthy. However, your care team may have specific advice just for you. Please talk to your care team about your preventive care needs.  Nutrition  Eat 5 or more servings of fruits and vegetables each day.  Try wheat bread, brown rice and whole grain pasta (instead of white bread, rice, and pasta).  Get enough calcium and vitamin D. Check the label on foods and aim for 100% of the RDA (recommended daily allowance).  Lifestyle  Exercise at least 150 minutes each week   (30 minutes a day, 5 days a week).  Do muscle strengthening activities 2 days a week. These help control your weight and prevent disease.  No smoking.  Wear sunscreen to prevent skin cancer.  Have a dental exam and cleaning every 6 months.  Yearly exams  See your health care team every year to talk about:  Any changes in your health.  Any medicines your care team has prescribed.  Preventive care, family planning, and ways to prevent chronic diseases.  Shots (vaccines)   HPV shots (up to age 26), if you've never had them before.  Hepatitis B shots (up to age 59), if you've never had them before.  COVID-19 shot: Get this shot when it's due.  Flu shot: Get a flu shot every year.  Tetanus shot: Get a tetanus shot every 10 years.  Pneumococcal, hepatitis A, and RSV shots: Ask your care team if you need these based on your risk.  Shingles shot (for age 50 and up).  General health tests  Diabetes screening:  Starting at age 35, Get screened for diabetes at least every 3 years.  If you are younger than age 35, ask your care team if you should be screened for diabetes.  Cholesterol test: At age 39, start having a cholesterol test every 5 years, or more often if advised.  Bone density scan (DEXA): At age 50, ask your care team if you should have this scan for osteoporosis (brittle bones).  Hepatitis C: Get tested at least once in your life.  STIs (sexually transmitted  infections)  Before age 24: Ask your care team if you should be screened for STIs.  After age 24: Get screened for STIs if you're at risk. You are at risk for STIs (including HIV) if:  You are sexually active with more than one person.  You don't use condoms every time.  You or a partner was diagnosed with a sexually transmitted infection.  If you are at risk for HIV, ask about PrEP medicine to prevent HIV.  Get tested for HIV at least once in your life, whether you are at risk for HIV or not.  Cancer screening tests  Cervical cancer screening: If you have a cervix, begin getting regular cervical cancer screening tests at age 21. Most people who have regular screenings with normal results can stop after age 65. Talk about this with your provider.  Breast cancer scan (mammogram): If you've ever had breasts, begin having regular mammograms starting at age 40. This is a scan to check for breast cancer.  Colon cancer screening: It is important to start screening for colon cancer at age 45.  Have a colonoscopy test every 10 years (or more often if you're at risk) Or, ask your provider about stool tests like a FIT test every year or Cologuard test every 3 years.  To learn more about your testing options, visit: [email or web address].  For help making a decision, visit: https://bit.ly/md39189.  Prostate cancer screening test: If you have a prostate and are age 55 to 69, ask your provider if you would benefit from a yearly prostate cancer screening test.  Lung cancer screening: If you are a current or former smoker age 50 to 80, ask your care team if ongoing lung cancer screenings are right for you.  For informational purposes only. Not to replace the advice of your health care provider. Copyright   2023 [employer]. All rights reserved. Clinically reviewed by the [facility]. [employer] 413770 - REV 01/24.    Preventing Falls: Care Instructions  Injuries and health  problems such as trouble walking or poor eyesight can increase your risk of falling. So can some medicines. But there are things you can do to help prevent falls. You can exercise to get stronger. You can also arrange your home to make it safer.    Talk to your doctor about the medicines you take. Ask if any of them increase the risk of falls and whether they can be changed or stopped.   Try to exercise regularly. It can help improve your strength and balance. This can help lower your risk of falling.     Practice fall safety and prevention.    Wear low-heeled shoes that fit well and give your feet good support. Talk to your doctor if you have foot problems that make this hard.  Carry a cellphone or wear a medical alert device that you can use to call for help.  Use stepladders instead of chairs to reach high objects. Don't climb if you're at risk for falls. Ask for help, if needed.  Wear the correct eyeglasses, if you need them.    Make your home safer.    Remove rugs, cords, clutter, and furniture from walkways.  Keep your house well lit. Use night-lights in hallways and bathrooms.  Install and use sturdy handrails on stairways.  Wear nonskid footwear, even inside. Don't walk barefoot or in socks without shoes.    Be safe outside.    Use handrails, curb cuts, and ramps whenever possible.  Keep your hands free by using a shoulder bag or backpack.  Try to walk in well-lit areas. Watch out for uneven ground, changes in pavement, and debris.  Be careful in the winter. Walk on the grass or gravel when sidewalks are slippery. Use de-icer on steps and walkways. Add non-slip devices to shoes.    Put grab bars and nonskid mats in your shower or tub and near the toilet. Try to use a shower chair or bath bench when bathing.   Get into a tub or shower by putting in your weaker leg first. Get out with your strong side first. Have a phone or medical alert device in the bathroom with you.   Where can you learn more?  Go to  "https://www.[employer]elly[employer].net/patiented  Enter G117 in the search box to learn more about \"Preventing Falls: Care Instructions.\"  Current as of: July 17, 2023               Content Version: 14.0    8105-4189 [employer].   Care instructions adapted under license by your healthcare professional. If you have questions about a medical condition or this instruction, always ask your healthcare professional. [employer] disclaims any warranty or liability for your use of this information.      Learning About Stress  What is stress?     Stress is your body's response to a hard situation. Your body can have a physical, emotional, or mental response. Stress is a fact of life for most people, and it affects everyone differently. What causes stress for you may not be stressful for someone else.  A lot of things can cause stress. You may feel stress when you go on a job interview, take a test, or run a race. This kind of short-term stress is normal and even useful. It can help you if you need to work hard or react quickly. For example, stress can help you finish an important job on time.  Long-term stress is caused by ongoing stressful situations or events. Examples of long-term stress include long-term health problems, ongoing problems at work, or conflicts in your family. Long-term stress can harm your health.  How does stress affect your health?  When you are stressed, your body responds as though you are in danger. It makes hormones that speed up your heart, make you breathe faster, and give you a burst of energy. This is called the fight-or-flight stress response. If the stress is over quickly, your body goes back to normal and no harm is done.  But if stress happens too often or lasts too long, it can have bad effects. Long-term stress can make you more likely to get sick, and it can make symptoms of some diseases worse. If you tense up when you are stressed, you may develop neck, shoulder, or low " back pain. Stress is linked to high blood pressure and heart disease.  Stress also harms your emotional health. It can make you carney, tense, or depressed. Your relationships may suffer, and you may not do well at work or school.  What can you do to manage stress?  You can try these things to help manage stress:   Do something active. Exercise or activity can help reduce stress. Walking is a great way to get started. Even everyday activities such as housecleaning or yard work can help.  Try yoga or scott chi. These techniques combine exercise and meditation. You may need some training at first to learn them.  Do something you enjoy. For example, listen to music or go to a movie. Practice your hobby or do volunteer work.  Meditate. This can help you relax, because you are not worrying about what happened before or what may happen in the future.  Do guided imagery. Imagine yourself in any setting that helps you feel calm. You can use online videos, books, or a teacher to guide you.  Do breathing exercises. For example:  From a standing position, bend forward from the waist with your knees slightly bent. Let your arms dangle close to the floor.  Breathe in slowly and deeply as you return to a standing position. Roll up slowly and lift your head last.  Hold your breath for just a few seconds in the standing position.  Breathe out slowly and bend forward from the waist.  Let your feelings out. Talk, laugh, cry, and express anger when you need to. Talking with supportive friends or family, a counselor, or a [employer]akeda leader about your feelings is a healthy way to relieve stress. Avoid discussing your feelings with people who make you feel worse.  Write. It may help to write about things that are bothering you. This helps you find out how much stress you feel and what is causing it. When you know this, you can find better ways to cope.  What can you do to prevent stress?  You might try some of these things to help prevent  "stress:  Manage your time. This helps you find time to do the things you want and need to do.  Get enough sleep. Your body recovers from the stresses of the day while you are sleeping.  Get support. Your family, friends, and community can make a difference in how you experience stress.  Limit your news feed. Avoid or limit time on social media or news that may make you feel stressed.  Do something active. Exercise or activity can help reduce stress. Walking is a great way to get started.  Where can you learn more?  Go to https://www.[employer].net/patiented  Enter N032 in the search box to learn more about \"Learning About Stress.\"  Current as of: October 24, 2023               Content Version: 14.0    5493-6067 [employer].   Care instructions adapted under license by your healthcare professional. If you have questions about a medical condition or this instruction, always ask your healthcare professional. [employer] disclaims any warranty or liability for your use of this information.      Substance Use Disorder: Care Instructions  Overview     You can improve your life and health by stopping your use of alcohol or drugs. When you don't drink or use drugs, you may feel and sleep better. You may get along better with your family, friends, and coworkers. There are medicines and programs that can help with substance use disorder.  How can you care for yourself at home?  Here are some ways to help you stay sober and prevent relapse.  If you have been given medicine to help keep you sober or reduce your cravings, be sure to take it exactly as prescribed.  Talk to your doctor about programs that can help you stop using drugs or drinking alcohol.  Do not keep alcohol or drugs in your home.  Plan ahead. Think about what you'll say if other people ask you to drink or use drugs. Try not to spend time with people who drink or use drugs.  Use the time and money spent on drinking or drugs to do " something that's important to you.  Preventing a relapse  Have a plan to deal with relapse. Learn to recognize changes in your thinking that lead you to drink or use drugs. Get help before you start to drink or use drugs again.  Try to stay away from situations, friends, or places that may lead you to drink or use drugs.  If you feel the need to drink alcohol or use drugs again, seek help right away. Call a trusted friend or family member. Some people get support from organizations such as Narcotics Anonymous or [employer] or from treatment facilities.  If you relapse, get help as soon as you can. Some people make a plan with another person that outlines what they want that person to do for them if they relapse. The plan usually includes how to handle the relapse and who to notify in case of relapse.  Don't give up. Remember that a relapse doesn't mean that you have failed. Use the experience to learn the triggers that lead you to drink or use drugs. Then quit again. Recovery is a lifelong process. Many people have several relapses before they are able to quit for good.  Follow-up care is a key part of your treatment and safety. Be sure to make and go to all appointments, and call your doctor if you are having problems. It's also a good idea to know your test results and keep a list of the medicines you take.  When should you call for help?   Call 911  anytime you think you may need emergency care. For example, call if you or someone else:    Has overdosed or has withdrawal signs. Be sure to tell the emergency workers that you are or someone else is using or trying to quit using drugs. Overdose or withdrawal signs may include:  Losing consciousness.  Seizure.  Seeing or hearing things that aren't there (hallucinations).     Is thinking or talking about suicide or harming others.   Where to get help 24 hours a day, 7 days a week   If you or someone you know talks about suicide, self-harm, a mental health crisis,  "a substance use crisis, or any other kind of emotional distress, get help right away. You can:    Call the Suicide and Crisis Lifeline at 988.     Call 5-483-946-TALK (1-513.975.8962).     Text HOME to 262532 to access the Crisis Text Line.   Consider saving these numbers in your phone.  Go to [employer] for more information or to chat online.  Call your doctor now or seek immediate medical care if:    You are having withdrawal symptoms. These may include nausea or vomiting, sweating, shakiness, and anxiety.   Watch closely for changes in your health, and be sure to contact your doctor if:    You have a relapse.     You need more help or support to stop.   Where can you learn more?  Go to https://www.[employer].net/patiented  Enter H573 in the search box to learn more about \"Substance Use Disorder: Care Instructions.\"  Current as of: November 15, 2023               Content Version: 14.0    [id number] [employer].   Care instructions adapted under license by your healthcare professional. If you have questions about a medical condition or this instruction, always ask your healthcare professional. [employer] disclaims any warranty or liability for your use of this information.      Preventive Care Advice   This is general advice given by our system to help you stay healthy. However, your care team may have specific advice just for you. Please talk to your care team about your preventive care needs.  Nutrition  Eat 5 or more servings of fruits and vegetables each day.  Try wheat bread, brown rice and whole grain pasta (instead of white bread, rice, and pasta).  Get enough calcium and vitamin D. Check the label on foods and aim for 100% of the RDA (recommended daily allowance).  Lifestyle  Exercise at least 150 minutes each week   (30 minutes a day, 5 days a week).  Do muscle strengthening activities 2 days a week. These help control your weight and prevent disease.  No smoking.  Wear " sunscreen to prevent skin cancer.  Have a dental exam and cleaning every 6 months.  Yearly exams  See your health care team every year to talk about:  Any changes in your health.  Any medicines your care team has prescribed.  Preventive care, family planning, and ways to prevent chronic diseases.  Shots (vaccines)   HPV shots (up to age 26), if you've never had them before.  Hepatitis B shots (up to age 59), if you've never had them before.  COVID-19 shot: Get this shot when it's due.  Flu shot: Get a flu shot every year.  Tetanus shot: Get a tetanus shot every 10 years.  Pneumococcal, hepatitis A, and RSV shots: Ask your care team if you need these based on your risk.  Shingles shot (for age 50 and up).  General health tests  Diabetes screening:  Starting at age 35, Get screened for diabetes at least every 3 years.  If you are younger than age 35, ask your care team if you should be screened for diabetes.  Cholesterol test: At age 39, start having a cholesterol test every 5 years, or more often if advised.  Bone density scan (DEXA): At age 50, ask your care team if you should have this scan for osteoporosis (brittle bones).  Hepatitis C: Get tested at least once in your life.  STIs (sexually transmitted infections)  Before age 24: Ask your care team if you should be screened for STIs.  After age 24: Get screened for STIs if you're at risk. You are at risk for STIs (including HIV) if:  You are sexually active with more than one person.  You don't use condoms every time.  You or a partner was diagnosed with a sexually transmitted infection.  If you are at risk for HIV, ask about PrEP medicine to prevent HIV.  Get tested for HIV at least once in your life, whether you are at risk for HIV or not.  Cancer screening tests  Cervical cancer screening: If you have a cervix, begin getting regular cervical cancer screening tests at age 21. Most people who have regular screenings with normal results can stop after age 65. Talk  about this with your provider.  Breast cancer scan (mammogram): If you've ever had breasts, begin having regular mammograms starting at age 40. This is a scan to check for breast cancer.  Colon cancer screening: It is important to start screening for colon cancer at age 45.  Have a colonoscopy test every 10 years (or more often if you're at risk) Or, ask your provider about stool tests like a FIT test every year or Cologuard test every 3 years.  To learn more about your testing options, visit: [email or web address].  For help making a decision, visit: https://bit.ly/oo89427.  Prostate cancer screening test: If you have a prostate and are age 55 to 69, ask your provider if you would benefit from a yearly prostate cancer screening test.  Lung cancer screening: If you are a current or former smoker age 50 to 80, ask your care team if ongoing lung cancer screenings are right for you.  For informational purposes only. Not to replace the advice of your health care provider. Copyright   2023 [employer]. All rights reserved. Clinically reviewed by the  [facility] [place] [facility]. [employer] 883701 - REV 01/24.    Preventing Falls: Care Instructions  Injuries and health problems such as trouble walking or poor eyesight can increase your risk of falling. So can some medicines. But there are things you can do to help prevent falls. You can exercise to get stronger. You can also arrange your home to make it safer.    Talk to your doctor about the medicines you take. Ask if any of them increase the risk of falls and whether they can be changed or stopped.   Try to exercise regularly. It can help improve your strength and balance. This can help lower your risk of falling.     Practice fall safety and prevention.    Wear low-heeled shoes that fit well and give your feet good support. Talk to your doctor if you have foot problems that make this hard.  Carry a cellphone or wear a medical alert  "device that you can use to call for help.  Use stepladders instead of chairs to reach high objects. Don't climb if you're at risk for falls. Ask for help, if needed.  Wear the correct eyeglasses, if you need them.    Make your home safer.    Remove rugs, cords, clutter, and furniture from walkways.  Keep your house well lit. Use night-lights in hallways and bathrooms.  Install and use sturdy handrails on stairways.  Wear nonskid footwear, even inside. Don't walk barefoot or in socks without shoes.    Be safe outside.    Use handrails, curb cuts, and ramps whenever possible.  Keep your hands free by using a shoulder bag or backpack.  Try to walk in well-lit areas. Watch out for uneven ground, changes in pavement, and debris.  Be careful in the winter. Walk on the grass or gravel when sidewalks are slippery. Use de-icer on steps and walkways. Add non-slip devices to shoes.    Put grab bars and nonskid mats in your shower or tub and near the toilet. Try to use a shower chair or bath bench when bathing.   Get into a tub or shower by putting in your weaker leg first. Get out with your strong side first. Have a phone or medical alert device in the bathroom with you.   Where can you learn more?  Go to https://www.[employer].net/patiented  Enter G117 in the search box to learn more about \"Preventing Falls: Care Instructions.\"  Current as of: July 17, 2023               Content Version: 14.0    8065-7209 [employer].   Care instructions adapted under license by your healthcare professional. If you have questions about a medical condition or this instruction, always ask your healthcare professional. [employer] disclaims any warranty or liability for your use of this information.      Learning About Stress  What is stress?     Stress is your body's response to a hard situation. Your body can have a physical, emotional, or mental response. Stress is a fact of life for most people, and it affects " everyone differently. What causes stress for you may not be stressful for someone else.  A lot of things can cause stress. You may feel stress when you go on a job interview, take a test, or run a race. This kind of short-term stress is normal and even useful. It can help you if you need to work hard or react quickly. For example, stress can help you finish an important job on time.  Long-term stress is caused by ongoing stressful situations or events. Examples of long-term stress include long-term health problems, ongoing problems at work, or conflicts in your family. Long-term stress can harm your health.  How does stress affect your health?  When you are stressed, your body responds as though you are in danger. It makes hormones that speed up your heart, make you breathe faster, and give you a burst of energy. This is called the fight-or-flight stress response. If the stress is over quickly, your body goes back to normal and no harm is done.  But if stress happens too often or lasts too long, it can have bad effects. Long-term stress can make you more likely to get sick, and it can make symptoms of some diseases worse. If you tense up when you are stressed, you may develop neck, shoulder, or low back pain. Stress is linked to high blood pressure and heart disease.  Stress also harms your emotional health. It can make you carney, tense, or depressed. Your relationships may suffer, and you may not do well at work or school.  What can you do to manage stress?  You can try these things to help manage stress:   Do something active. Exercise or activity can help reduce stress. Walking is a great way to get started. Even everyday activities such as housecleaning or yard work can help.  Try yoga or scott chi. These techniques combine exercise and meditation. You may need some training at first to learn them.  Do something you enjoy. For example, listen to music or go to a movie. Practice your hobby or do volunteer  "work.  Meditate. This can help you relax, because you are not worrying about what happened before or what may happen in the future.  Do guided imagery. Imagine yourself in any setting that helps you feel calm. You can use online videos, books, or a teacher to guide you.  Do breathing exercises. For example:  From a standing position, bend forward from the waist with your knees slightly bent. Let your arms dangle close to the floor.  Breathe in slowly and deeply as you return to a standing position. Roll up slowly and lift your head last.  Hold your breath for just a few seconds in the standing position.  Breathe out slowly and bend forward from the waist.  Let your feelings out. Talk, laugh, cry, and express anger when you need to. Talking with supportive friends or family, a counselor, or a [employer] leader about your feelings is a healthy way to relieve stress. Avoid discussing your feelings with people who make you feel worse.  Write. It may help to write about things that are bothering you. This helps you find out how much stress you feel and what is causing it. When you know this, you can find better ways to cope.  What can you do to prevent stress?  You might try some of these things to help prevent stress:  Manage your time. This helps you find time to do the things you want and need to do.  Get enough sleep. Your body recovers from the stresses of the day while you are sleeping.  Get support. Your family, friends, and community can make a difference in how you experience stress.  Limit your news feed. Avoid or limit time on social media or news that may make you feel stressed.  Do something active. Exercise or activity can help reduce stress. Walking is a great way to get started.  Where can you learn more?  Go to https://www.healthwise.net/patiented  Enter N032 in the search box to learn more about \"Learning About Stress.\"  Current as of: October 24, 2023               Content Version: 14.0    3679-0070 " Healthwise, [employer].   Care instructions adapted under license by your healthcare professional. If you have questions about a medical condition or this instruction, always ask your healthcare professional. [employer] disclaims any warranty or liability for your use of this information.      Substance Use Disorder: Care Instructions  Overview     You can improve your life and health by stopping your use of alcohol or drugs. When you don't drink or use drugs, you may feel and sleep better. You may get along better with your family, friends, and coworkers. There are medicines and programs that can help with substance use disorder.  How can you care for yourself at home?  Here are some ways to help you stay sober and prevent relapse.  If you have been given medicine to help keep you sober or reduce your cravings, be sure to take it exactly as prescribed.  Talk to your doctor about programs that can help you stop using drugs or drinking alcohol.  Do not keep alcohol or drugs in your home.  Plan ahead. Think about what you'll say if other people ask you to drink or use drugs. Try not to spend time with people who drink or use drugs.  Use the time and money spent on drinking or drugs to do something that's important to you.  Preventing a relapse  Have a plan to deal with relapse. Learn to recognize changes in your thinking that lead you to drink or use drugs. Get help before you start to drink or use drugs again.  Try to stay away from situations, friends, or places that may lead you to drink or use drugs.  If you feel the need to drink alcohol or use drugs again, seek help right away. Call a trusted friend or family member. Some people get support from organizations such as Narcotics Anonymous or [employer] or from treatment facilities.  If you relapse, get help as soon as you can. Some people make a plan with another person that outlines what they want that person to do for them if they relapse.  The plan usually includes how to handle the relapse and who to notify in case of relapse.  Don't give up. Remember that a relapse doesn't mean that you have failed. Use the experience to learn the triggers that lead you to drink or use drugs. Then quit again. Recovery is a lifelong process. Many people have several relapses before they are able to quit for good.  Follow-up care is a key part of your treatment and safety. Be sure to make and go to all appointments, and call your doctor if you are having problems. It's also a good idea to know your test results and keep a list of the medicines you take.  When should you call for help?   Call 911  anytime you think you may need emergency care. For example, call if you or someone else:    Has overdosed or has withdrawal signs. Be sure to tell the emergency workers that you are or someone else is using or trying to quit using drugs. Overdose or withdrawal signs may include:  Losing consciousness.  Seizure.  Seeing or hearing things that aren't there (hallucinations).     Is thinking or talking about suicide or harming others.   Where to get help 24 hours a day, 7 days a week   If you or someone you know talks about suicide, self-harm, a mental health crisis, a substance use crisis, or any other kind of emotional distress, get help right away. You can:    Call the Suicide and Crisis Lifeline at [facility].     Call 4-830-414-EFBD (1-667.148.3334).     Text HOME to 796496 to access the Crisis Text Line.   Consider saving these numbers in your phone.  Go to [employer] for more information or to chat online.  Call your doctor now or seek immediate medical care if:    You are having withdrawal symptoms. These may include nausea or vomiting, sweating, shakiness, and anxiety.   Watch closely for changes in your health, and be sure to contact your doctor if:    You have a relapse.     You need more help or support to stop.   Where can you learn more?  Go to  "https://www.[employer].net/patiented  Enter H573 in the search box to learn more about \"Substance Use Disorder: Care Instructions.\"  Current as of: November 15, 2023               Content Version: 14.0    4019-8864 Healthwise, [employer].   Care instructions adapted under license by your healthcare professional. If you have questions about a medical condition or this instruction, always ask your healthcare professional. Healthwise, [employer] disclaims any warranty or liability for your use of this information.      "

## 2024-03-27 NOTE — LETTER
My Asthma Action Plan    Name: Mike Schafer   YOB: 1970  Date: 3/27/2024   My doctor: Yuval Evans, DO   My clinic: [facility]        My Rescue Medicine:   Albuterol inhaler (Proair/Ventolin/Proventil HFA)  2 puffs EVERY 4 HOURS as needed. Use a spacer if recommended by your provider.   My Asthma Severity:   Intermittent / Exercise Induced  Know your asthma triggers: smoke and cold air             GREEN ZONE   Good Control  I feel good  No cough or wheeze  Can work, sleep and play without asthma symptoms       Take your asthma control medicine every day.     If exercise triggers your asthma, take your rescue medication  15 minutes before exercise or sports, and  During exercise if you have asthma symptoms  Spacer to use with inhaler: If you have a spacer, make sure to use it with your inhaler             YELLOW ZONE Getting Worse  I have ANY of these:  I do not feel good  Cough or wheeze  Chest feels tight  Wake up at night   Keep taking your Green Zone medications  Start taking your rescue medicine:  every 20 minutes for up to 1 hour. Then every 4 hours for 24-48 hours.  If you stay in the Yellow Zone for more than 12-24 hours, contact your doctor.  If you do not return to the Green Zone in 12-24 hours or you get worse, start taking your oral steroid medicine if prescribed by your provider.           RED ZONE Medical Alert - Get Help  I have ANY of these:  I feel awful  Medicine is not helping  Breathing getting harder  Trouble walking or talking  Nose opens wide to breathe       Take your rescue medicine NOW  If your provider has prescribed an oral steroid medicine, start taking it NOW  Call your doctor NOW  If you are still in the Red Zone after 20 minutes and you have not reached your doctor:  Take your rescue medicine again and  Call 911 or go to the emergency room right away    See your regular doctor within 2 weeks of an Emergency Room or Urgent Care visit for  follow-up treatment.          Annual Reminders:  Meet with Asthma Educator,  Flu Shot in the Fall, consider Pneumonia Vaccination for patients with asthma (aged 19 and older).    Pharmacy: [id number] [id number]  [address]    Electronically signed by Yuval Evans DO   Date: 03/27/24                    Asthma Triggers  How To Control Things That Make Your Asthma Worse    Triggers are things that make your asthma worse.  Look at the list below to help you find your triggers and   what you can do about them. You can help prevent asthma flare-ups by staying away from your triggers.      Trigger                                                          What you can do   Cigarette Smoke  Tobacco smoke can make asthma worse. Do not allow smoking in your home, car or around you.  Be sure no one smokes at a child s day care or school.  If you smoke, ask your health care provider for ways to help you quit.  Ask family members to quit too.  Ask your health care provider for a referral to Quit Plan to help you quit smoking, or call 7-402-390-PLAN.     Colds, Flu, Bronchitis  These are common triggers of asthma. Wash your hands often.  Don t touch your eyes, nose or mouth.  Get a flu shot every year.     Dust Mites  These are tiny bugs that live in cloth or carpet. They are too small to see. Wash sheets and blankets in hot water every week.   Encase pillows and mattress in dust mite proof covers.  Avoid having carpet if you can. If you have carpet, vacuum weekly.   Use a dust mask and HEPA vacuum.   Pollen and Outdoor Mold  Some people are allergic to trees, grass, or weed pollen, or molds. Try to keep your windows closed.  Limit time out doors when pollen count is high.   Ask you health care provider about taking medicine during allergy season.     Animal Dander  Some people are allergic to skin flakes, urine or saliva from pets with fur or feathers. Keep pets with fur or feathers out of your home.     If you can t keep the pet outdoors, then keep the pet out of your bedroom.  Keep the bedroom door closed.  Keep pets off cloth furniture and away from stuffed toys.     Mice, Rats, and Cockroaches  Some people are allergic to the waste from these pests.   Cover food and garbage.  Clean up spills and food crumbs.  Store grease in the refrigerator.   Keep food out of the bedroom.   Indoor Mold  This can be a trigger if your home has high moisture. Fix leaking faucets, pipes, or other sources of water.   Clean moldy surfaces.  Dehumidify basement if it is damp and smelly.   Smoke, Strong Odors, and Sprays  These can reduce air quality. Stay away from strong odors and sprays, such as perfume, powder, hair spray, paints, smoke incense, paint, cleaning products, candles and new carpet.   Exercise or Sports  Some people with asthma have this trigger. Be active!  Ask your doctor about taking medicine before sports or exercise to prevent symptoms.    Warm up for 5-10 minutes before and after sports or exercise.     Other Triggers of Asthma  Cold air:  Cover your nose and mouth with a scarf.  Sometimes laughing or crying can be a trigger.  Some medicines and food can trigger asthma.

## 2024-04-05 ENCOUNTER — TELEPHONE (OUTPATIENT)
Dept: FAMILY MEDICINE | Facility: CLINIC | Age: 54
End: 2024-04-05
Payer: COMMERCIAL

## 2024-04-05 NOTE — TELEPHONE ENCOUNTER
Summary:    Patient is due/failing the following:   BP CHECK    Reviewed:    [] CARE EVERYWHERE  [] LAST OV NOTE   [] FYI TAB  [] MYCHART ACTIVE?  [] LAST PANEL ENCOUNTER  [] FUTURE APPTS  [] IMMUNIZATIONS  [] Media Tab            Action needed:   Patient needs nurse only appointment.    Type of outreach:    Sent [employer] message.                                                                               Poly Cook/ROLY  Rulo---[facility]

## 2024-05-16 ENCOUNTER — OFFICE VISIT (OUTPATIENT)
Dept: FAMILY MEDICINE | Facility: CLINIC | Age: 54
End: 2024-05-16
Payer: COMMERCIAL

## 2024-05-16 VITALS
RESPIRATION RATE: 18 BRPM | HEIGHT: 68 IN | TEMPERATURE: 97.8 F | DIASTOLIC BLOOD PRESSURE: 90 MMHG | SYSTOLIC BLOOD PRESSURE: 140 MMHG | HEART RATE: 74 BPM | OXYGEN SATURATION: 96 % | WEIGHT: 315 LBS | BODY MASS INDEX: 47.74 KG/M2

## 2024-05-16 DIAGNOSIS — I10 PRIMARY HYPERTENSION: ICD-10-CM

## 2024-05-16 DIAGNOSIS — Z01.818 PRE-OP EVALUATION: Primary | ICD-10-CM

## 2024-05-16 DIAGNOSIS — E66.01 MORBID OBESITY (H): ICD-10-CM

## 2024-05-16 DIAGNOSIS — G47.33 OSA (OBSTRUCTIVE SLEEP APNEA): ICD-10-CM

## 2024-05-16 DIAGNOSIS — M25.561 RIGHT KNEE PAIN, UNSPECIFIED CHRONICITY: ICD-10-CM

## 2024-05-16 PROBLEM — M31.0 LEUKOCYTOCLASTIC VASCULITIS (H): Status: RESOLVED | Noted: 2023-08-08 | Resolved: 2024-05-16

## 2024-05-16 LAB
ERYTHROCYTE [DISTWIDTH] IN BLOOD BY AUTOMATED COUNT: 12.8 % (ref 10–15)
HCT VFR BLD AUTO: 45.3 % (ref 40–53)
HGB BLD-MCNC: 14.6 G/DL (ref 13.3–17.7)
MCH RBC QN AUTO: 28.6 PG (ref 26.5–33)
MCHC RBC AUTO-ENTMCNC: 32.2 G/DL (ref 31.5–36.5)
MCV RBC AUTO: 89 FL (ref 78–100)
PLATELET # BLD AUTO: 317 10E3/UL (ref 150–450)
RBC # BLD AUTO: 5.11 10E6/UL (ref 4.4–5.9)
WBC # BLD AUTO: 9.8 10E3/UL (ref 4–11)

## 2024-05-16 PROCEDURE — 85027 COMPLETE CBC AUTOMATED: CPT | Performed by: FAMILY MEDICINE

## 2024-05-16 PROCEDURE — 80048 BASIC METABOLIC PNL TOTAL CA: CPT | Performed by: FAMILY MEDICINE

## 2024-05-16 PROCEDURE — 99214 OFFICE O/P EST MOD 30 MIN: CPT | Performed by: FAMILY MEDICINE

## 2024-05-16 PROCEDURE — 93000 ELECTROCARDIOGRAM COMPLETE: CPT | Performed by: FAMILY MEDICINE

## 2024-05-16 PROCEDURE — 36415 COLL VENOUS BLD VENIPUNCTURE: CPT | Performed by: FAMILY MEDICINE

## 2024-05-16 RX ORDER — AMLODIPINE BESYLATE 10 MG/1
10 TABLET ORAL DAILY
Qty: 90 TABLET | Refills: 0 | Status: SHIPPED | OUTPATIENT
Start: 2024-05-16 | End: 2024-06-10

## 2024-05-16 ASSESSMENT — ASTHMA QUESTIONNAIRES: ACT_TOTALSCORE: 25

## 2024-05-16 NOTE — PROGRESS NOTES
Preoperative Evaluation  [facility]  [address] [address]  Phone: [phone]  Primary Provider: Yuval Evans DO  Pre-op Performing Provider: Demetrice Moseley MD  May 16, 2024   {        5/15/2024   Surgical Information   What procedure is being done? Pre op   Facility or Hospital where procedure/surgery will be performed: [facility]. [facility] ARTHROSCOPIC DEBRIDEMENT VS REPAIR KNEE MENISCUS, POSSIBLE CHONDROPLASTY    Who is doing the procedure / surgery? Dr Love   Date of surgery / procedure: 5/23/24   Time of surgery / procedure: 12:45pm   Where do you plan to recover after surgery? at home with family     Fax number for surgical facility:     Assessment & Plan     The proposed surgical procedure is considered LOW risk.    Pre-op evaluation    Undergoing low risk surgery ARTHROSCOPIC DEBRIDEMENT VS REPAIR KNEE MENISCUS, POSSIBLE CHONDROPLASTY   - EKG 12-lead complete w/read - Clinics  - CBC with platelets  - Basic metabolic panel  (Ca, Cl, CO2, Creat, Gluc, K, Na, BUN)  Physical activity greater than 4 mets   Low perioperative cardiac risk .      Right knee pain, unspecified chronicity  Undergoing surgery for     Primary hypertension  Blood pressure above goal   Recommend to increase the dose of blood pressure medication   - amLODIPine (NORVASC) 10 MG tablet; Take 1 tablet (10 mg) by mouth daily    Morbid obesity (H)  Discussed diet modification .    GIACOMO (obstructive sleep apnea)  Does not wear cpap machine .         - No identified additional risk factors other than previously addressed    Patient will hold aspirin and ibuprofen 7 days prior     Recommendation  Approval given to proceed with proposed procedure, without further diagnostic evaluation.        Leah Wetzel is a 53 year old, presenting for the following:  Pre-Op Exam          5/16/2024     2:20 PM   Additional Questions   Roomed by Kathia FLORES related to upcoming procedure:          5/15/2024   Pre-Op Questionnaire   Have you ever had a heart attack or stroke? No   Have you ever had surgery on your heart or blood vessels, such as a stent placement, a coronary artery bypass, or surgery on an artery in your head, neck, heart, or legs? No   Do you have chest pain with activity? No   Do you have a history of heart failure? No   Do you currently have a cold, bronchitis or symptoms of other infection? No   Do you have a cough, shortness of breath, or wheezing? No   Do you or anyone in your family have previous history of blood clots? No   Do you or does anyone in your family have a serious bleeding problem such as prolonged bleeding following surgeries or cuts? No   Have you ever had problems with anemia or been told to take iron pills? No   Have you had any abnormal blood loss such as black, tarry or bloody stools? No   Have you ever had a blood transfusion? No   Are you willing to have a blood transfusion if it is medically needed before, during, or after your surgery? Yes   Have you or any of your relatives ever had problems with anesthesia? No   Do you have sleep apnea, excessive snoring or daytime drowsiness? No   Do you have any artifical heart valves or other implanted medical devices like a pacemaker, defibrillator, or continuous glucose monitor? No   Do you have artificial joints? No   Are you allergic to latex? No     Health Care Directive  Patient does not have a Health Care Directive or Living Will:     P    Patient Active Problem List    Diagnosis Date Noted    Chondromalacia of knee, left 11/20/2023     Priority: Medium    Tear of medial meniscus of left knee, current 11/20/2023     Priority: Medium    Leukocytoclastic vasculitis (H) 08/08/2023     Priority: Medium    Gastroesophageal reflux disease without esophagitis 02/17/2022     Priority: Medium    GIACOMO (obstructive sleep apnea) 07/21/2021     Priority: Medium     7/19/2021 [facility] Sleep Study (325.0 lbs) - AHI 20.7, RDI  33.1, Supine AHI 37.8, REM AHI 25.8, Low O2 86.7%, Time Spent ?88% 0.3 minutes / Time Spent ?89% 1.0 minutes.      Morbid obesity (H) 05/20/2021     Priority: Medium    Benign essential hypertension 07/12/2016     Priority: Medium    Diverticular disease of colon 06/03/2014     Priority: Medium    Hypertension 11/10/2011     Priority: Medium    Impotence of organic origin 07/07/2010     Priority: Medium      Past Medical History:   Diagnosis Date    Chondromalacia of knee, left 11/20/2023    Hypertension      Past Surgical History:   Procedure Laterality Date    BIOPSY  November 2021    SOFT TISSUE SURGERY  03/17/2020     Current Outpatient Medications   Medication Sig Dispense Refill    albuterol (PROAIR HFA/PROVENTIL HFA/VENTOLIN HFA) 108 (90 Base) MCG/ACT inhaler Inhale 2 puffs into the lungs every 6 hours 8.5 g 3    amLODIPine (NORVASC) 5 MG tablet Take 1 tablet (5 mg) by mouth daily 90 tablet 0       No Known Allergies     Social History     Tobacco Use    Smoking status: Never    Smokeless tobacco: Never   Substance Use Topics    Alcohol use: Not Currently     Family History   Problem Relation Age of Onset    Breast Cancer Mother     Diabetes Father     Other Cancer Maternal Grandmother     Other Cancer Paternal Grandfather      History   Drug Use No             Review of Systems  CONSTITUTIONAL: NEGATIVE for fever, chills, change in weight  INTEGUMENTARY/SKIN: NEGATIVE for worrisome rashes, moles or lesions  EYES: NEGATIVE for vision changes or irritation  ENT/MOUTH: NEGATIVE for ear, mouth and throat problems  RESP: NEGATIVE for significant cough or SOB  BREAST: NEGATIVE for masses, tenderness or discharge  CV: NEGATIVE for chest pain, palpitations or peripheral edema  GI: NEGATIVE for nausea, abdominal pain, heartburn, or change in bowel habits  : NEGATIVE for frequency, dysuria, or hematuria  MUSCULOSKELETAL: NEGATIVE for significant arthralgias or myalgia  NEURO: NEGATIVE for weakness, dizziness or  "paresthesias  ENDOCRINE: NEGATIVE for temperature intolerance, skin/hair changes  HEME: NEGATIVE for bleeding problems  PSYCHIATRIC: NEGATIVE for changes in mood or affect    Objective    BP (!) 163/102   Pulse 74   Temp 97.8  F (36.6  C) (Tympanic)   Resp 18   Ht 1.727 m (5' 8\")   Wt 145.7 kg (321 lb 3.2 oz)   SpO2 96%   BMI 48.84 kg/m     Estimated body mass index is 48.84 kg/m  as calculated from the following:    Height as of this encounter: 1.727 m (5' 8\").    Weight as of this encounter: 145.7 kg (321 lb 3.2 oz).  Physical Exam  GENERAL: alert and no distress  EYES: Eyes grossly normal to inspection, PERRL and conjunctivae and sclerae normal  HENT: ear canals and TM's normal, nose and mouth without ulcers or lesions  NECK: no adenopathy, no asymmetry, masses, or scars  RESP: lungs clear to auscultation - no rales, rhonchi or wheezes  CV: regular rate and rhythm, normal S1 S2, no S3 or S4, no murmur, click or rub, no peripheral edema  ABDOMEN: soft, nontender, no hepatosplenomegaly, no masses and bowel sounds normal  MS: no gross musculoskeletal defects noted, no edema  SKIN: no suspicious lesions or rashes  NEURO: Normal strength and tone, mentation intact and speech normal  PSYCH: mentation appears normal, affect normal/bright    Recent Labs   Lab Test 08/08/23  1550   HGB 14.6         POTASSIUM 4.7   CR 0.73        Diagnostics  Recent Results (from the past 168 hour(s))   CBC with platelets    Collection Time: 05/16/24  2:56 PM   Result Value Ref Range    WBC Count 9.8 4.0 - 11.0 10e3/uL    RBC Count 5.11 4.40 - 5.90 10e6/uL    Hemoglobin 14.6 13.3 - 17.7 g/dL    Hematocrit 45.3 40.0 - 53.0 %    MCV 89 78 - 100 fL    MCH 28.6 26.5 - 33.0 pg    MCHC 32.2 31.5 - 36.5 g/dL    RDW 12.8 10.0 - 15.0 %    Platelet Count 317 150 - 450 10e3/uL      EKG: appears normal, NSR, normal axis, normal intervals, no acute ST/T changes c/w ischemia, no LVH by voltage criteria, unchanged from previous " tracings    Revised Cardiac Risk Index (RCRI)  The patient has the following serious cardiovascular risks for perioperative complications:   - No serious cardiac risks = 0 points     RCRI Interpretation: 0 points: Class I (very low risk - 0.4% complication rate)         Signed Electronically by: Demetrice Moseley MD  Copy of this evaluation report is provided to requesting physician.

## 2024-05-18 LAB
ANION GAP SERPL CALCULATED.3IONS-SCNC: 13 MMOL/L (ref 7–15)
BUN SERPL-MCNC: 14.7 MG/DL (ref 6–20)
CALCIUM SERPL-MCNC: 9.7 MG/DL (ref 8.6–10)
CHLORIDE SERPL-SCNC: 103 MMOL/L (ref 98–107)
CREAT SERPL-MCNC: 0.74 MG/DL (ref 0.67–1.17)
DEPRECATED HCO3 PLAS-SCNC: 25 MMOL/L (ref 22–29)
EGFRCR SERPLBLD CKD-EPI 2021: >90 ML/MIN/1.73M2
GLUCOSE SERPL-MCNC: 76 MG/DL (ref 70–99)
POTASSIUM SERPL-SCNC: 4.4 MMOL/L (ref 3.4–5.3)
SODIUM SERPL-SCNC: 141 MMOL/L (ref 135–145)

## 2024-06-10 ENCOUNTER — OFFICE VISIT (OUTPATIENT)
Dept: FAMILY MEDICINE | Facility: CLINIC | Age: 54
End: 2024-06-10
Payer: COMMERCIAL

## 2024-06-10 VITALS
TEMPERATURE: 97.8 F | OXYGEN SATURATION: 95 % | DIASTOLIC BLOOD PRESSURE: 90 MMHG | SYSTOLIC BLOOD PRESSURE: 138 MMHG | HEIGHT: 69 IN | HEART RATE: 83 BPM | RESPIRATION RATE: 16 BRPM | BODY MASS INDEX: 46.65 KG/M2 | WEIGHT: 315 LBS

## 2024-06-10 DIAGNOSIS — I10 PRIMARY HYPERTENSION: ICD-10-CM

## 2024-06-10 DIAGNOSIS — N52.9 ERECTILE DYSFUNCTION, UNSPECIFIED ERECTILE DYSFUNCTION TYPE: ICD-10-CM

## 2024-06-10 DIAGNOSIS — F41.8 SITUATIONAL ANXIETY: Primary | ICD-10-CM

## 2024-06-10 PROCEDURE — 36415 COLL VENOUS BLD VENIPUNCTURE: CPT | Performed by: FAMILY MEDICINE

## 2024-06-10 PROCEDURE — 99214 OFFICE O/P EST MOD 30 MIN: CPT | Performed by: FAMILY MEDICINE

## 2024-06-10 PROCEDURE — 84403 ASSAY OF TOTAL TESTOSTERONE: CPT | Performed by: FAMILY MEDICINE

## 2024-06-10 RX ORDER — LORAZEPAM 0.5 MG/1
0.5 TABLET ORAL EVERY 6 HOURS PRN
Qty: 6 TABLET | Refills: 0 | Status: SHIPPED | OUTPATIENT
Start: 2024-06-10

## 2024-06-10 RX ORDER — SILDENAFIL 50 MG/1
TABLET, FILM COATED ORAL
Qty: 90 TABLET | Refills: 3 | Status: SHIPPED | OUTPATIENT
Start: 2024-06-10

## 2024-06-10 RX ORDER — HYDROCODONE BITARTRATE AND ACETAMINOPHEN 5; 325 MG/1; MG/1
1-2 TABLET ORAL EVERY 4 HOURS PRN
COMMUNITY
Start: 2024-05-23

## 2024-06-10 RX ORDER — AMLODIPINE BESYLATE 10 MG/1
10 TABLET ORAL DAILY
Qty: 90 TABLET | Refills: 3 | Status: SHIPPED | OUTPATIENT
Start: 2024-06-10

## 2024-06-10 NOTE — PROGRESS NOTES
"  Assessment & Plan   See after visit summary and result note for helpful information and advice given to patient.    Amlodipine dosage increased due to inadequately controlled blood pressure this visit.    Primary hypertension    - amLODIPine (NORVASC) 10 MG tablet  Dispense: 90 tablet; Refill: 3    Situational anxiety    - LORazepam (ATIVAN) 0.5 MG tablet  Dispense: 6 tablet; Refill: 0    Erectile dysfunction, unspecified erectile dysfunction type    - sildenafil (VIAGRA) 50 MG tablet  Dispense: 90 tablet; Refill: 3  - Testosterone total  - Testosterone total  - Prostate Specific Antigen Screen      BMI  Estimated body mass index is 47.55 kg/m  as calculated from the following:    Height as of this encounter: 1.753 m (5' 9\").    Weight as of this encounter: 146.1 kg (322 lb).             Leah Wetzel is a 53 year old, presenting for the following health issues:  Hypertension (Follow-up meds, BP check) and LAB REQUEST (Patient requesting testosterone levels checked)        6/10/2024     9:12 AM   Additional Questions   Roomed by Poly Cook     History of Present Illness       Reason for visit:  Blood work for low T count.    He eats 0-1 servings of fruits and vegetables daily.He consumes 4 sweetened beverage(s) daily.He exercises with enough effort to increase his heart rate 10 to 19 minutes per day.  He exercises with enough effort to increase his heart rate 3 or less days per week.   He is taking medications regularly.                 Review of Systems  Patient visit done for hypertension follow-up along with discussion of anxiety while flying, and discussion of erectile dysfunction.    No new side effects from increased dose of amlodipine. He feels better overall with bettr blood pressure.     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     Will be flying in August, and he gets situational anxiety.  He requested a medication to help cope with this.    Is interested in ED " "medication, with trouble keeping an erection.         Objective    BP (!) 138/90 (BP Location: Right arm, Patient Position: Chair, Cuff Size: Adult Large)   Pulse 83   Temp 97.8  F (36.6  C) (Oral)   Resp 16   Ht 1.753 m (5' 9\")   Wt 146.1 kg (322 lb)   SpO2 95%   BMI 47.55 kg/m    Body mass index is 47.55 kg/m .  Physical Exam   Vital signs reviewed.  Patient is in no acute appearing distress.  Breathing appears nonlabored.  Patient is alert and oriented ×3.  Patient is pleasant, making good eye contact and responding with clear fluent speech.    Heart: Heart rate is regular without murmur.    Lungs: Lungs are clear to auscultation with good airflow bilaterally.    Skin/extremities: Warm and dry, with no lower leg edema.    Exam Psych: See earlier documentation.  In addition, patient answers questions appropriately.  No pressured speech.  No psychomotor agitation.            Signed Electronically by: Yuval Evans DO    "

## 2024-06-15 LAB — TESTOST SERPL-MCNC: 165 NG/DL (ref 240–950)

## 2024-06-19 NOTE — PATIENT INSTRUCTIONS
Let me know if you have any side effects from the increased dosage of amlodipine.  Ankle edema is the most frequent adverse side effect.

## 2024-07-01 ENCOUNTER — LAB (OUTPATIENT)
Dept: LAB | Facility: CLINIC | Age: 54
End: 2024-07-01
Payer: COMMERCIAL

## 2024-07-01 ENCOUNTER — ALLIED HEALTH/NURSE VISIT (OUTPATIENT)
Dept: FAMILY MEDICINE | Facility: CLINIC | Age: 54
End: 2024-07-01
Payer: COMMERCIAL

## 2024-07-01 VITALS — DIASTOLIC BLOOD PRESSURE: 92 MMHG | SYSTOLIC BLOOD PRESSURE: 128 MMHG

## 2024-07-01 DIAGNOSIS — I10 PRIMARY HYPERTENSION: ICD-10-CM

## 2024-07-01 DIAGNOSIS — Z23 NEED FOR SHINGLES VACCINE: Primary | ICD-10-CM

## 2024-07-01 DIAGNOSIS — N52.9 ERECTILE DYSFUNCTION, UNSPECIFIED ERECTILE DYSFUNCTION TYPE: ICD-10-CM

## 2024-07-01 LAB — PSA SERPL DL<=0.01 NG/ML-MCNC: 0.19 NG/ML (ref 0–3.5)

## 2024-07-01 PROCEDURE — 99207 PR NO CHARGE NURSE ONLY: CPT

## 2024-07-01 PROCEDURE — G0103 PSA SCREENING: HCPCS

## 2024-07-01 PROCEDURE — 36415 COLL VENOUS BLD VENIPUNCTURE: CPT

## 2024-07-01 PROCEDURE — 90750 HZV VACC RECOMBINANT IM: CPT

## 2024-07-01 PROCEDURE — 84403 ASSAY OF TOTAL TESTOSTERONE: CPT

## 2024-07-01 PROCEDURE — 90471 IMMUNIZATION ADMIN: CPT

## 2024-07-01 NOTE — PROGRESS NOTES
Mike Schafer is a 53 year old patient who comes in today for a Blood Pressure check.  Initial BP:  BP (!) 128/92 (BP Location: Right arm, Patient Position: Sitting, Cuff Size: Adult Large)        Disposition: follow-up as previously indicated by provider.    Shingrix # 2 Given.     Prior to immunization administration, verified patients identity using patient s name and date of birth. Please see Immunization Activity for additional information.     Screening Questionnaire for Adult Immunization    Are you sick today?   No   Do you have allergies to medications, food, a vaccine component or latex?   No   Have you ever had a serious reaction after receiving a vaccination?   No   Do you have a long-term health problem with heart, lung, kidney, or metabolic disease (e.g., diabetes), asthma, a blood disorder, no spleen, complement component deficiency, a cochlear implant, or a spinal fluid leak?  Are you on long-term aspirin therapy?   No   Do you have cancer, leukemia, HIV/AIDS, or any other immune system problem?   No   Do you have a parent, brother, or sister with an immune system problem?   No   In the past 3 months, have you taken medications that affect  your immune system, such as prednisone, other steroids, or anticancer drugs; drugs for the treatment of rheumatoid arthritis, Crohn s disease, or psoriasis; or have you had radiation treatments?   No   Have you had a seizure, or a brain or other nervous system problem?   No   During the past year, have you received a transfusion of blood or blood    products, or been given immune (gamma) globulin or antiviral drug?   No   For women: Are you pregnant or is there a chance you could become       pregnant during the next month?   No   Have you received any vaccinations in the past 4 weeks?   No     Immunization questionnaire answers were all negative.    I have reviewed the following standing orders:   This patient is due and qualifies for the Zoster  vaccine.    Click here for Zoster Standing Order    I have reviewed the vaccines inclusion and exclusion criteria; No concerns regarding eligibility.     Patient instructed to remain in clinic for 15 minutes afterwards, and to report any adverse reactions.     Screening performed by Janessa Amos CMA on 7/1/2024 at 8:23 AM.

## 2024-07-04 LAB — TESTOST SERPL-MCNC: 169 NG/DL (ref 240–950)

## 2024-07-16 ENCOUNTER — TELEPHONE (OUTPATIENT)
Dept: FAMILY MEDICINE | Facility: CLINIC | Age: 54
End: 2024-07-16
Payer: COMMERCIAL

## 2024-07-16 NOTE — TELEPHONE ENCOUNTER
Summary:    Patient is due/failing the following:   IVD    Reviewed:    [] CARE EVERYWHERE  [] LAST OV NOTE   [] FYI TAB  [] MYCHART ACTIVE?  [] LAST PANEL ENCOUNTER  [] FUTURE APPTS  [] IMMUNIZATIONS  [] Media Tab            Action needed:   Patient needs office visit for IVD.    Type of outreach:    Chart updated                                                                               Poly Cook/ROLY  [place]

## 2024-09-19 ENCOUNTER — OFFICE VISIT (OUTPATIENT)
Dept: URGENT CARE | Facility: URGENT CARE | Age: 54
End: 2024-09-19
Payer: COMMERCIAL

## 2024-09-19 VITALS
WEIGHT: 315 LBS | DIASTOLIC BLOOD PRESSURE: 80 MMHG | OXYGEN SATURATION: 96 % | SYSTOLIC BLOOD PRESSURE: 134 MMHG | BODY MASS INDEX: 47.7 KG/M2 | TEMPERATURE: 98 F | HEART RATE: 72 BPM

## 2024-09-19 DIAGNOSIS — H00.011 HORDEOLUM EXTERNUM OF RIGHT UPPER EYELID: Primary | ICD-10-CM

## 2024-09-19 DIAGNOSIS — L03.011 PARONYCHIA OF RIGHT INDEX FINGER: ICD-10-CM

## 2024-09-19 PROCEDURE — 99213 OFFICE O/P EST LOW 20 MIN: CPT | Performed by: FAMILY MEDICINE

## 2024-09-19 RX ORDER — CEPHALEXIN 500 MG/1
500 CAPSULE ORAL 3 TIMES DAILY
Qty: 30 CAPSULE | Refills: 0 | Status: SHIPPED | OUTPATIENT
Start: 2024-09-19 | End: 2024-09-29

## 2024-09-19 RX ORDER — POLYMYXIN B SULFATE AND TRIMETHOPRIM 1; 10000 MG/ML; [USP'U]/ML
1 SOLUTION OPHTHALMIC EVERY 4 HOURS
Qty: 10 ML | Refills: 0 | Status: SHIPPED | OUTPATIENT
Start: 2024-09-19 | End: 2024-09-25

## 2024-09-19 NOTE — PROGRESS NOTES
ASSESSMENT/  PLAN:  Hordeolum externum of right upper eyelid     - cephALEXin (KEFLEX) 500 MG capsule; Take 1 capsule (500 mg) by mouth 3 times daily for 10 days.  - polymixin b-trimethoprim (POLYTRIM) 81020-1.1 UNIT/ML-% ophthalmic solution; Apply 1 drop to eye every 4 hours for 6 days.    Eyelid swelling has improved since initial onset 5 days ago, but he still has tender swollen lump right upper lateral eyelid.      May apply warm packs bid to the upper eyelid to help with the infection    Paronychia of right index finger     - cephALEXin (KEFLEX) 500 MG capsule; Take 1 capsule (500 mg) by mouth 3 times daily for 10 days.     patient was advised to perform warm water soaks twice daily to help with local blood flow to improve the infection.           ---------------------------------------------------------------------------------------------------------------------------------------------------------------------    SUBJECTIVE:  Chief Complaint   Patient presents with    Urgent Care     Right index finger sore and has a green spot, played base this past weekend and finger was sore, getting worse,  right eye concern, used peroxide, not helpful  right eye sore and goopy, X5 days , used otc eye drops, mild relief, itchy and painful in the am, was outside cutting wood last week      Mike Schafer is a 54 year old male who presents complaining of right hand  second digit pain.  He has noted some tenderness redness and swelling along the cuticle margin.  Symptoms began 3-4 days ago.   Severity: moderate.  He denies any trauma to the area. Though it started after playing bass [place] [employer] a long time over the weekend 5 days ago  No fevers or chills noted.  No migration of redness or swelling proximally.    Also he developed a tender lump of the right upper outer eyelid initially 5 days ago,  with little mucous drainage in the eye.  No vision changes,  no discomfort of the eyeball,  no itching of the globe.   No relief with moisturizing eye drops,  cleaned eyelid with peroxide    Past Medical History:   Diagnosis Date    Chondromalacia of knee, left 11/20/2023    Hypertension      Patient Active Problem List   Diagnosis    Benign essential hypertension    Morbid obesity (H)    GIACOMO (obstructive sleep apnea)    Chondromalacia of knee, left    Diverticular disease of colon    Gastroesophageal reflux disease without esophagitis    Hypertension    Impotence of organic origin    Tear of medial meniscus of left knee, current       ALLERGIES:  Lisinopril    Current Outpatient Medications   Medication Sig Dispense Refill    albuterol (PROAIR HFA/PROVENTIL HFA/VENTOLIN HFA) 108 (90 Base) MCG/ACT inhaler Inhale 2 puffs into the lungs every 6 hours 8.5 g 3    amLODIPine (NORVASC) 10 MG tablet Take 1 tablet (10 mg) by mouth daily 90 tablet 3    cephALEXin (KEFLEX) 500 MG capsule Take 1 capsule (500 mg) by mouth 3 times daily for 10 days. 30 capsule 0    polymixin b-trimethoprim (POLYTRIM) 51533-7.1 UNIT/ML-% ophthalmic solution Apply 1 drop to eye every 4 hours for 6 days. 10 mL 0    sildenafil (VIAGRA) 50 MG tablet Take 1/2 to 2 tablets orally daily 1 hour before sexual activity as needed for treatment of ED. 90 tablet 3    HYDROcodone-acetaminophen (NORCO) 5-325 MG tablet Take 1-2 tablets by mouth every 4 hours as needed (Patient not taking: Reported on 6/10/2024)      LORazepam (ATIVAN) 0.5 MG tablet Take 1 tablet (0.5 mg) by mouth every 6 hours as needed for anxiety (Patient not taking: Reported on 9/19/2024) 6 tablet 0     No current facility-administered medications for this visit.       Social History     Tobacco Use    Smoking status: Never    Smokeless tobacco: Never   Substance Use Topics    Alcohol use: Not Currently       Family History   Problem Relation Age of Onset    Breast Cancer Mother     Diabetes Father     Other Cancer Maternal Grandmother     Other Cancer Paternal Grandfather        ROS:  CONSTITUTIONAL:NEGATIVE  for fever, chills,   ENT/MOUTH: NEGATIVE for ear, mouth and throat problems  RESP:NEGATIVE for significant cough or SOB  GI: NEGATIVE for nausea, abdominal pain,  or change in bowel habits    OBJECTIVE:  /80   Pulse 72   Temp 98  F (36.7  C) (Tympanic)   Wt 146.5 kg (323 lb)   SpO2 96%   BMI 47.70 kg/m    right Hand exam:  examination of second digit reveals paronychia with redness, tenderness and swelling along the distal digit at the nail margin.      EYES:  PERRLA,  EOMI,  no photophobia,  no drainage-  right upper eyelid with 2mm pustule with local swelling of the outer margin of the eyelid-  in the region of the eyelashes    GENERAL APPEARANCE: healthy, alert and no distress  MS:extremities normal- no gross deformities noted,  FROM noted in all extremities  NEURO: Normal strength and tone, sensory exam grossly normal,  normal speech and mentation  SKIN: no suspicious lesions or rashes

## 2025-02-19 NOTE — TELEPHONE ENCOUNTER
Addended by: LUISA CALL on: 2/19/2025 02:44 PM     Modules accepted: Orders     Please call pt to verify if he has used the entire inhaler sent 2 weeks ago.  If so please route to provider to review.    Joan Wei RN, BSN

## 2025-04-16 ENCOUNTER — OFFICE VISIT (OUTPATIENT)
Dept: URGENT CARE | Facility: URGENT CARE | Age: 55
End: 2025-04-16
Payer: COMMERCIAL

## 2025-04-16 VITALS
SYSTOLIC BLOOD PRESSURE: 142 MMHG | WEIGHT: 315 LBS | HEIGHT: 69 IN | OXYGEN SATURATION: 97 % | BODY MASS INDEX: 46.65 KG/M2 | HEART RATE: 84 BPM | TEMPERATURE: 98.4 F | RESPIRATION RATE: 16 BRPM | DIASTOLIC BLOOD PRESSURE: 96 MMHG

## 2025-04-16 DIAGNOSIS — J06.9 VIRAL UPPER RESPIRATORY TRACT INFECTION WITH COUGH: Primary | ICD-10-CM

## 2025-04-16 DIAGNOSIS — H61.23 BILATERAL IMPACTED CERUMEN: ICD-10-CM

## 2025-04-16 PROCEDURE — 69209 REMOVE IMPACTED EAR WAX UNI: CPT | Mod: 50 | Performed by: PHYSICIAN ASSISTANT

## 2025-04-16 PROCEDURE — 3080F DIAST BP >= 90 MM HG: CPT | Performed by: PHYSICIAN ASSISTANT

## 2025-04-16 PROCEDURE — 3077F SYST BP >= 140 MM HG: CPT | Performed by: PHYSICIAN ASSISTANT

## 2025-04-16 PROCEDURE — 99213 OFFICE O/P EST LOW 20 MIN: CPT | Mod: 25 | Performed by: PHYSICIAN ASSISTANT

## 2025-04-16 NOTE — PATIENT INSTRUCTIONS
Viral URI:  Patient was educated on the natural course of viral illness which typically lasts up to 10 days.  Conservative measures include increased fluids, nasal saline irrigation (neti pot), warm steamy shower, salt water gargles, cough suppressants, expectorants (Mucinex), and analgesics (Tylenol and/or Ibuprofen). See your primary care provider if symptoms worsen or do not improve in 7 days. Seek emergency care if you develop fever over 104 or shortness of breath.      Cerumen impaction:  Patient was educated on the natural  course of the condition. Conservative measures to prevent ear wax build up including applying a few drops of mineral oil or earwax softener (Debrox) were discussed.  Avoid using q tips as this may push ear wax further in to the ear canal.  See your primary care provider if symptoms worsen or do not improve in 7 days. Seek emergency care if you develop severe ear pain or fever over 103.

## 2025-04-16 NOTE — LETTER
April 16, 2025      Mike Schafer  [id number]  [facility] [address]        To Whom It May Concern:    Mike Schafer  was seen in clinic today. Please excuse from work 4/14 through 4/16/25 due to illness.         Sincerely,        Virgen Pope PA-C    Electronically signed

## 2025-04-16 NOTE — PROGRESS NOTES
Urgent Care Clinic Visit    Chief Complaint   Patient presents with    Nasal Congestion     -X 4 days, congestion, coughing,fever first two days, would go to , was told to come in from work to get a letter. Currently only has congestion and a cough. Feels good today, didn't take any medications, was using dayquil and nyquil.               4/16/2025    12:13 PM   Additional Questions   Roomed by Bailee MAYFIELD

## 2025-04-16 NOTE — PROGRESS NOTES
"URGENT CARE VISIT:    SUBJECTIVE:   Mike Schafer is a 54 year old male presenting with a chief complaint of fever, stuffy nose, and cough - productive.  Onset was 4 day(s) ago.   He denies the following symptoms: shortness of breath  Course of illness is improving.    Treatment measures tried include dayquil with some relief of symptoms.  Predisposing factors include None.    PMH:   Past Medical History:   Diagnosis Date    Chondromalacia of knee, left 11/20/2023    Hypertension      Allergies: Lisinopril   Medications:   Current Outpatient Medications   Medication Sig Dispense Refill    albuterol (PROAIR HFA/PROVENTIL HFA/VENTOLIN HFA) 108 (90 Base) MCG/ACT inhaler Inhale 2 puffs into the lungs every 6 hours 8.5 g 3    amLODIPine (NORVASC) 10 MG tablet Take 1 tablet (10 mg) by mouth daily 90 tablet 3    sildenafil (VIAGRA) 50 MG tablet Take 1/2 to 2 tablets orally daily 1 hour before sexual activity as needed for treatment of ED. 90 tablet 3    HYDROcodone-acetaminophen (NORCO) 5-325 MG tablet Take 1-2 tablets by mouth every 4 hours as needed (Patient not taking: Reported on 6/10/2024)      LORazepam (ATIVAN) 0.5 MG tablet Take 1 tablet (0.5 mg) by mouth every 6 hours as needed for anxiety (Patient not taking: Reported on 9/19/2024) 6 tablet 0     Social History:   Social History     Tobacco Use    Smoking status: Never    Smokeless tobacco: Never   Substance Use Topics    Alcohol use: Not Currently       ROS:  Review of systems negative except as stated above.    OBJECTIVE:  BP (!) 142/96   Pulse 84   Temp 98.4  F (36.9  C)   Resp 16   Ht 1.753 m (5' 9\")   Wt (!) 149.2 kg (329 lb)   SpO2 97%   BMI 48.58 kg/m    GENERAL APPEARANCE: healthy, alert and no distress  EYES: EOMI,  PERRL, conjunctiva clear  HENT: ear canals with cerumen. After removal TM's appear normal.  Nose and mouth without ulcers, erythema or lesions  NECK: supple, nontender, no lymphadenopathy  RESP: lungs clear to auscultation - no " rales, rhonchi or wheezes  CV: regular rates and rhythm, normal S1 S2, no murmur noted  SKIN: no suspicious lesions or rashes    Procedure:    Ear irrigation used to remove bilateral cerumen. Patient tolerated the procedure well.      ASSESSMENT:    ICD-10-CM    1. Viral upper respiratory tract infection with cough  J06.9       2. Bilateral impacted cerumen  H61.23 MT REMOVAL IMPACTED CERUMEN IRRIGATION/LVG UNILAT          PLAN:  Patient Instructions   Viral URI:  Patient was educated on the natural course of viral illness which typically lasts up to 10 days.  Conservative measures include increased fluids, nasal saline irrigation (neti pot), warm steamy shower, salt water gargles, cough suppressants, expectorants (Mucinex), and analgesics (Tylenol and/or Ibuprofen). See your primary care provider if symptoms worsen or do not improve in 7 days. Seek emergency care if you develop fever over 104 or shortness of breath.      Cerumen impaction:  Patient was educated on the natural  course of the condition. Conservative measures to prevent ear wax build up including applying a few drops of mineral oil or earwax softener (Debrox) were discussed.  Avoid using q tips as this may push ear wax further in to the ear canal.  See your primary care provider if symptoms worsen or do not improve in 7 days. Seek emergency care if you develop severe ear pain or fever over 103.    Patient verbalized understanding and is agreeable to plan. The patient was discharged ambulatory and in stable condition.    Virgen Pope PA-C ....................  4/16/2025   12:43 PM

## 2025-05-04 SDOH — HEALTH STABILITY: PHYSICAL HEALTH: ON AVERAGE, HOW MANY MINUTES DO YOU ENGAGE IN EXERCISE AT THIS LEVEL?: 40 MIN

## 2025-05-04 SDOH — HEALTH STABILITY: PHYSICAL HEALTH: ON AVERAGE, HOW MANY DAYS PER WEEK DO YOU ENGAGE IN MODERATE TO STRENUOUS EXERCISE (LIKE A BRISK WALK)?: 3 DAYS

## 2025-05-04 ASSESSMENT — ASTHMA QUESTIONNAIRES
QUESTION_1 LAST FOUR WEEKS HOW MUCH OF THE TIME DID YOUR ASTHMA KEEP YOU FROM GETTING AS MUCH DONE AT WORK, SCHOOL OR AT HOME: NONE OF THE TIME
QUESTION_3 LAST FOUR WEEKS HOW OFTEN DID YOUR ASTHMA SYMPTOMS (WHEEZING, COUGHING, SHORTNESS OF BREATH, CHEST TIGHTNESS OR PAIN) WAKE YOU UP AT NIGHT OR EARLIER THAN USUAL IN THE MORNING: NOT AT ALL
QUESTION_2 LAST FOUR WEEKS HOW OFTEN HAVE YOU HAD SHORTNESS OF BREATH: NOT AT ALL
QUESTION_5 LAST FOUR WEEKS HOW WOULD YOU RATE YOUR ASTHMA CONTROL: WELL CONTROLLED
ACT_TOTALSCORE: 23
QUESTION_4 LAST FOUR WEEKS HOW OFTEN HAVE YOU USED YOUR RESCUE INHALER OR NEBULIZER MEDICATION (SUCH AS ALBUTEROL): ONCE A WEEK OR LESS

## 2025-05-04 ASSESSMENT — SOCIAL DETERMINANTS OF HEALTH (SDOH): HOW OFTEN DO YOU GET TOGETHER WITH FRIENDS OR RELATIVES?: ONCE A WEEK

## 2025-05-05 ENCOUNTER — OFFICE VISIT (OUTPATIENT)
Dept: FAMILY MEDICINE | Facility: CLINIC | Age: 55
End: 2025-05-05
Attending: FAMILY MEDICINE
Payer: COMMERCIAL

## 2025-05-05 VITALS
BODY MASS INDEX: 47.74 KG/M2 | HEIGHT: 68 IN | WEIGHT: 315 LBS | OXYGEN SATURATION: 96 % | DIASTOLIC BLOOD PRESSURE: 86 MMHG | RESPIRATION RATE: 24 BRPM | SYSTOLIC BLOOD PRESSURE: 132 MMHG | TEMPERATURE: 99 F | HEART RATE: 90 BPM

## 2025-05-05 DIAGNOSIS — Z13.220 SCREENING FOR HYPERLIPIDEMIA: ICD-10-CM

## 2025-05-05 DIAGNOSIS — Z13.1 SCREENING FOR DIABETES MELLITUS: ICD-10-CM

## 2025-05-05 DIAGNOSIS — Z00.00 ROUTINE GENERAL MEDICAL EXAMINATION AT A HEALTH CARE FACILITY: ICD-10-CM

## 2025-05-05 DIAGNOSIS — E66.01 MORBID OBESITY (H): ICD-10-CM

## 2025-05-05 DIAGNOSIS — J45.20 MILD INTERMITTENT ASTHMA WITHOUT COMPLICATION: ICD-10-CM

## 2025-05-05 DIAGNOSIS — I10 BENIGN ESSENTIAL HYPERTENSION: ICD-10-CM

## 2025-05-05 DIAGNOSIS — I10 PRIMARY HYPERTENSION: Primary | ICD-10-CM

## 2025-05-05 LAB
ANION GAP SERPL CALCULATED.3IONS-SCNC: 13 MMOL/L (ref 7–15)
BUN SERPL-MCNC: 14.3 MG/DL (ref 6–20)
CALCIUM SERPL-MCNC: 9.8 MG/DL (ref 8.8–10.4)
CHLORIDE SERPL-SCNC: 102 MMOL/L (ref 98–107)
CHOLEST SERPL-MCNC: 182 MG/DL
CREAT SERPL-MCNC: 0.83 MG/DL (ref 0.67–1.17)
EGFRCR SERPLBLD CKD-EPI 2021: >90 ML/MIN/1.73M2
EST. AVERAGE GLUCOSE BLD GHB EST-MCNC: 111 MG/DL
FASTING STATUS PATIENT QL REPORTED: YES
FASTING STATUS PATIENT QL REPORTED: YES
GLUCOSE SERPL-MCNC: 92 MG/DL (ref 70–99)
HBA1C MFR BLD: 5.5 % (ref 0–5.6)
HCO3 SERPL-SCNC: 25 MMOL/L (ref 22–29)
HDLC SERPL-MCNC: 48 MG/DL
LDLC SERPL CALC-MCNC: 118 MG/DL
NONHDLC SERPL-MCNC: 134 MG/DL
POTASSIUM SERPL-SCNC: 4.4 MMOL/L (ref 3.4–5.3)
SODIUM SERPL-SCNC: 140 MMOL/L (ref 135–145)
TRIGL SERPL-MCNC: 80 MG/DL

## 2025-05-05 PROCEDURE — 3079F DIAST BP 80-89 MM HG: CPT | Performed by: FAMILY MEDICINE

## 2025-05-05 PROCEDURE — 3075F SYST BP GE 130 - 139MM HG: CPT | Performed by: FAMILY MEDICINE

## 2025-05-05 PROCEDURE — 80061 LIPID PANEL: CPT | Performed by: FAMILY MEDICINE

## 2025-05-05 PROCEDURE — 99213 OFFICE O/P EST LOW 20 MIN: CPT | Mod: 25 | Performed by: FAMILY MEDICINE

## 2025-05-05 PROCEDURE — 99396 PREV VISIT EST AGE 40-64: CPT | Performed by: FAMILY MEDICINE

## 2025-05-05 PROCEDURE — 83036 HEMOGLOBIN GLYCOSYLATED A1C: CPT | Performed by: FAMILY MEDICINE

## 2025-05-05 PROCEDURE — 36415 COLL VENOUS BLD VENIPUNCTURE: CPT | Performed by: FAMILY MEDICINE

## 2025-05-05 PROCEDURE — G2211 COMPLEX E/M VISIT ADD ON: HCPCS | Performed by: FAMILY MEDICINE

## 2025-05-05 PROCEDURE — 80048 BASIC METABOLIC PNL TOTAL CA: CPT | Performed by: FAMILY MEDICINE

## 2025-05-05 RX ORDER — METOPROLOL SUCCINATE 50 MG/1
50 TABLET, EXTENDED RELEASE ORAL DAILY
Qty: 90 TABLET | Refills: 4 | Status: SHIPPED | OUTPATIENT
Start: 2025-05-05

## 2025-05-05 RX ORDER — ALBUTEROL SULFATE 90 UG/1
2 INHALANT RESPIRATORY (INHALATION) EVERY 6 HOURS
Qty: 8.5 G | Refills: 3 | Status: SHIPPED | OUTPATIENT
Start: 2025-05-05

## 2025-05-05 NOTE — LETTER
My Asthma Action Plan    Name: Mike Schafer   YOB: 1970  Date: 5/5/2025   My doctor: Yuval Evans, DO   My clinic: [facility]        My Rescue Medicine: Albuterol (Proair/Ventolin/Proventil HFA) 2-4 puffs EVERY 4 HOURS as needed. Use a spacer if recommended by your provider.   My Asthma Severity:   Intermittent / Exercise Induced  Know your asthma triggers: upper respiratory infections and cold air             GREEN ZONE   Good Control  I feel good  No cough or wheeze  Can work, sleep and play without asthma symptoms       Take your asthma control medicine every day.     If exercise triggers your asthma, take your rescue medication  15 minutes before exercise or sports, and  During exercise if you have asthma symptoms  Spacer to use with inhaler: If you have a spacer, make sure to use it with your inhaler             YELLOW ZONE Getting Worse  I have ANY of these:  I do not feel good  Cough or wheeze  Chest feels tight  Wake up at night   Keep taking your Green Zone medications  Start taking your rescue medicine:  every 20 minutes for up to 1 hour. Then every 4 hours for 24-48 hours.  If you stay in the Yellow Zone for more than 12-24 hours, contact your doctor.  If you do not return to the Green Zone in 12-24 hours or you get worse, start taking your oral steroid medicine if prescribed by your provider.           RED ZONE Medical Alert - Get Help  I have ANY of these:  I feel awful  Medicine is not helping  Breathing getting harder  Trouble walking or talking  Nose opens wide to breathe       Take your rescue medicine NOW  If your provider has prescribed an oral steroid medicine, start taking it NOW  Call your doctor NOW  If you are still in the Red Zone after 20 minutes and you have not reached your doctor:  Take your rescue medicine again and  Call 911 or go to the emergency room right away    See your regular doctor within 2 weeks of an Emergency Room or Urgent  Care visit for follow-up treatment.          Annual Reminders:  Meet with Asthma Educator,  Flu Shot in the Fall, consider Pneumonia Vaccination for patients with asthma (aged 19 and older).    Pharmacy: [id number] [id number]  [address]    Electronically signed by Yuval Evans DO   Date: 05/05/25                    Asthma Triggers  How To Control Things That Make Your Asthma Worse    Triggers are things that make your asthma worse.  Look at the list below to help you find your triggers and   what you can do about them. You can help prevent asthma flare-ups by staying away from your triggers.      Trigger                                                          What you can do   Cigarette Smoke  Tobacco smoke can make asthma worse. Do not allow smoking in your home, car or around you.  Be sure no one smokes at a child s day care or school.  If you smoke, ask your health care provider for ways to help you quit.  Ask family members to quit too.  Ask your health care provider for a referral to Quit Plan to help you quit smoking, or call 3-747-486-PLAN.     Colds, Flu, Bronchitis  These are common triggers of asthma. Wash your hands often.  Don t touch your eyes, nose or mouth.  Get a flu shot every year.     Dust Mites  These are tiny bugs that live in cloth or carpet. They are too small to see. Wash sheets and blankets in hot water every week.   Encase pillows and mattress in dust mite proof covers.  Avoid having carpet if you can. If you have carpet, vacuum weekly.   Use a dust mask and HEPA vacuum.   Pollen and Outdoor Mold  Some people are allergic to trees, grass, or weed pollen, or molds. Try to keep your windows closed.  Limit time out doors when pollen count is high.   Ask you health care provider about taking medicine during allergy season.     Animal Dander  Some people are allergic to skin flakes, urine or saliva from pets with fur or feathers. Keep pets with fur or feathers out of  your home.    If you can t keep the pet outdoors, then keep the pet out of your bedroom.  Keep the bedroom door closed.  Keep pets off cloth furniture and away from stuffed toys.     Mice, Rats, and Cockroaches  Some people are allergic to the waste from these pests.   Cover food and garbage.  Clean up spills and food crumbs.  Store grease in the refrigerator.   Keep food out of the bedroom.   Indoor Mold  This can be a trigger if your home has high moisture. Fix leaking faucets, pipes, or other sources of water.   Clean moldy surfaces.  Dehumidify basement if it is damp and smelly.   Smoke, Strong Odors, and Sprays  These can reduce air quality. Stay away from strong odors and sprays, such as perfume, powder, hair spray, paints, smoke incense, paint, cleaning products, candles and new carpet.   Exercise or Sports  Some people with asthma have this trigger. Be active!  Ask your doctor about taking medicine before sports or exercise to prevent symptoms.    Warm up for 5-10 minutes before and after sports or exercise.     Other Triggers of Asthma  Cold air:  Cover your nose and mouth with a scarf.  Sometimes laughing or crying can be a trigger.  Some medicines and food can trigger asthma.

## 2025-05-05 NOTE — PROGRESS NOTES
Preventive Care Visit  [facility]  Yuval Evans DO, Family Medicine  May 5, 2025  {Provider  Link to SmartSet :738121}    {PROVIDER CHARTING PREFERENCE:766584}    Subjective   Rashard is a 54 year old, presenting for the following:  Physical (Pt here for annual preventative visit. Pt is fasting for labs. ) and Recheck Medication (Pt would like to check on amlodipine. Pt states he is experiencing some side effects like swollen feet and gums, fatigue. )        5/5/2025     2:09 PM   Additional Questions   Roomed by Joanne Best MA Learner   Accompanied by Self          HPI     {SUPERLIST (Optional):430451}  {additonal problems for provider to add (Optional):313699}  Advance Care Planning  {The storyboard will display whether the patient has ACP docs on file. Hover over the Code section in the storyboard to access the ACP documents. :865288}  Discussed advance care planning with patient; informed AVS has link to [employer] Cho[employer].        5/4/2025   General Health   How would you rate your overall physical health? (!) FAIR   Feel stress (tense, anxious, or unable to sleep) Only a little   (!) STRESS CONCERN      5/4/2025   Nutrition   Three or more servings of calcium each day? (!) NO   Diet: Regular (no restrictions)   How many servings of fruit and vegetables per day? (!) 0-1   How many sweetened beverages each day? (!) 4+         5/4/2025   Exercise   Days per week of moderate/strenous exercise 3 days   Average minutes spent exercising at this level 40 min         5/4/2025   Social Factors   Frequency of gathering with friends or relatives Once a week   Worry food won't last until get money to buy more No   Food not last or not have enough money for food? No   Do you have housing? (Housing is defined as stable permanent housing and does not include staying outside in a car, in a tent, in an abandoned building, in an overnight shelter, or couch-surfing.) Yes   Are you worried about  losing your housing? No   Lack of transportation? No   Unable to get utilities (heat,electricity)? No         5/4/2025   Fall Risk   Fallen 2 or more times in the past year? No   Trouble with walking or balance? No          5/4/2025   Dental   Dentist two times every year? Yes       Today's PHQ-2 Score:       5/4/2025     9:45 AM   PHQ-2 ( 1999 [employer])   Q1: Little interest or pleasure in doing things 0   Q2: Feeling down, depressed or hopeless 0   PHQ-2 Score 0    Q1: Little interest or pleasure in doing things Not at all   Q2: Feeling down, depressed or hopeless Not at all   PHQ-2 Score 0       Patient-reported           5/4/2025   Substance Use   Alcohol more than 3/day or more than 7/wk No   Do you use any other substances recreationally? (!) CANNABIS PRODUCTS     Social History     Tobacco Use    Smoking status: Never     Passive exposure: Never    Smokeless tobacco: Never   Vaping Use    Vaping status: Never Used   Substance Use Topics    Alcohol use: Not Currently    Drug use: No     {Provider  If there are gaps in the social history shown above, please follow the link to update and then refresh the note Link to Social and Substance History :819713}      5/4/2025   STI Screening   New sexual partner(s) since last STI/HIV test? No   ASCVD Risk   The 10-year ASCVD risk score (Farhan GUNTER, et al., 2019) is: 4.7%    Values used to calculate the score:      Age: 54 years      Sex: Male      Is Non- : No      Diabetic: No      Tobacco smoker: No      Systolic Blood Pressure: 132 mmHg      Is BP treated: Yes      HDL Cholesterol: 50 mg/dL      Total Cholesterol: 157 mg/dL    {Link to Fracture Risk Assessment Tool (Optional):994498}    {Provider  REQUIRED FOR AWV Use the storyboard to review patient history, after sections have been marked as reviewed, refresh note to capture documentation:214816}   Reviewed and updated as needed this visit by Provider         Imtiaz Avelar         "    {HISTORY OPTIONS (Optional):498524}    {ROS Picklists (Optional):568341}    Patient is having ankle swelling he feels is related to his amlodipine treatment. He had some gum swelling at dentist making him concerned about taking amlodipine.     Patient stopped taking metoprolol thinking that it may have been causing increased urine incontinence. He has determined it was not a cause of this. He will be incontinent if he waits too long to void. He feel he empties bladder, but urination will sometimes be flow. He would like to try mgt with metoprolol again.     He will get up at night to void, being worse if he consumes caffeine.     Has maxed out weight mgt through diet and exercise. He feels his knee pain is related to his obesity.        Objective    Exam  /86 (BP Location: Right arm, Patient Position: Sitting, Cuff Size: Adult Large)   Pulse 90   Temp 99  F (37.2  C) (Oral)   Resp 24   Ht 1.727 m (5' 8\")   Wt (!) 150.5 kg (331 lb 12.8 oz)   SpO2 96%   BMI 50.45 kg/m     Estimated body mass index is 50.45 kg/m  as calculated from the following:    Height as of this encounter: 1.727 m (5' 8\").    Weight as of this encounter: 150.5 kg (331 lb 12.8 oz).    Physical Exam  {Exam Choices (Optional):429806}    Mild bilateral ankle edema.       Signed Electronically by: Yuval Evans DO  {Email feedback regarding this note to [email or web address]   :120897}  " "appetite such as the GLP-1 agonist medications, and stimulant medications such as phentermine.  My preferred to use the GLP-1 agonist because of less risk of increasing his blood pressure.  Patient does not have personal or family history of thyroid cancer.  I discussed the more common side effects such as nausea, constipation, and diarrhea.  The patient that stopping treatment usually leads to at least partial regaining of lost weight.       Objective    Exam  /86 (BP Location: Right arm, Patient Position: Sitting, Cuff Size: Adult Large)   Pulse 90   Temp 99  F (37.2  C) (Oral)   Resp 24   Ht 1.727 m (5' 8\")   Wt (!) 150.5 kg (331 lb 12.8 oz)   SpO2 96%   BMI 50.45 kg/m     Estimated body mass index is 50.45 kg/m  as calculated from the following:    Height as of this encounter: 1.727 m (5' 8\").    Weight as of this encounter: 150.5 kg (331 lb 12.8 oz).    Physical Exam  General: Vital signs reviewed.  Patient is in no acute appearing distress.  Breathing appears nonlabored.  Patient is alert and oriented ×3.      ENT: Ear exam shows bilateral tympanic membranes to be clear without injection, nasal turbinates show no injection or edema, no pharyngeal injection or exudate.    Neck: supple with no adenoapthy, palpable abnormal masses, or thyroid abnormality.    Eyes: No scleral, lid, or periorbital injection or edema noted.  No eye mattering noted.  Corneas are clear. Pupils are equal round and reactive to light with normal consensual eye movement.    Heart: Heart rate is regular without murmur.    Lungs: Lungs are clear to auscultation with good airflow bilaterally.    Abdomen:  Abdomen is soft, nontender.  No palpable abnormal masses or organomegaly.  Bowel sounds are normal.    Genital exam: Patient declined exam for possible hernia.    Back: No areas of tenderness.    Skin: Warm and dry, with no rash or abnormal lesions noted.    Extremities: Mild bilateral lower leg edema noted.  No joint edema or " restricted range of motion noted.    Neuro: No acute focal deficits or other abnormalities noted.    Psych: Patient is pleasant, making good eye contact, with clear and fluent speech.  Answers questions appropriately. No psychomotor agitation.    Signed Electronically by: Yuval Evans DO

## 2025-05-05 NOTE — PATIENT INSTRUCTIONS
Patient Education   Preventive Care Advice   This is general advice given by our system to help you stay healthy. However, your care team may have specific advice just for you. Please talk to your care team about your preventive care needs.  Nutrition  Eat 5 or more servings of fruits and vegetables each day.  Try wheat bread, brown rice and whole grain pasta (instead of white bread, rice, and pasta).  Get enough calcium and vitamin D. Check the label on foods and aim for 100% of the RDA (recommended daily allowance).  Lifestyle  Exercise at least 150 minutes each week  (30 minutes a day, 5 days a week).  Do muscle strengthening activities 2 days a week. These help control your weight and prevent disease.  No smoking.  Wear sunscreen to prevent skin cancer.  Have a dental exam and cleaning every 6 months.  Yearly exams  See your health care team every year to talk about:  Any changes in your health.  Any medicines your care team has prescribed.  Preventive care, family planning, and ways to prevent chronic diseases.  Shots (vaccines)   HPV shots (up to age 26), if you've never had them before.  Hepatitis B shots (up to age 59), if you've never had them before.  COVID-19 shot: Get this shot when it's due.  Flu shot: Get a flu shot every year.  Tetanus shot: Get a tetanus shot every 10 years.  Pneumococcal, hepatitis A, and RSV shots: Ask your care team if you need these based on your risk.  Shingles shot (for age 50 and up)  General health tests  Diabetes screening:  Starting at age 35, Get screened for diabetes at least every 3 years.  If you are younger than age 35, ask your care team if you should be screened for diabetes.  Cholesterol test: At age 39, start having a cholesterol test every 5 years, or more often if advised.  Bone density scan (DEXA): At age 50, ask your care team if you should have this scan for osteoporosis (brittle bones).  Hepatitis C: Get tested at least once in your life.  STIs (sexually  transmitted infections)  Before age 24: Ask your care team if you should be screened for STIs.  After age 24: Get screened for STIs if you're at risk. You are at risk for STIs (including HIV) if:  You are sexually active with more than one person.  You don't use condoms every time.  You or a partner was diagnosed with a sexually transmitted infection.  If you are at risk for HIV, ask about PrEP medicine to prevent HIV.  Get tested for HIV at least once in your life, whether you are at risk for HIV or not.  Cancer screening tests  Cervical cancer screening: If you have a cervix, begin getting regular cervical cancer screening tests starting at age 21.  Breast cancer scan (mammogram): If you've ever had breasts, begin having regular mammograms starting at age 40. This is a scan to check for breast cancer.  Colon cancer screening: It is important to start screening for colon cancer at age 45.  Have a colonoscopy test every 10 years (or more often if you're at risk) Or, ask your provider about stool tests like a FIT test every year or Cologuard test every 3 years.  To learn more about your testing options, visit:   .  For help making a decision, visit:   https://bit.ly/ef15278.  Prostate cancer screening test: If you have a prostate, ask your care team if a prostate cancer screening test (PSA) at age 55 is right for you.  Lung cancer screening: If you are a current or former smoker ages 50 to 80, ask your care team if ongoing lung cancer screenings are right for you.  For informational purposes only. Not to replace the advice of your health care provider. Copyright   2023 [employer]. All rights reserved. Clinically reviewed by the [facility] Transitions Program. [employer] [id number] - REV 01/24.

## 2025-06-12 ENCOUNTER — MYC REFILL (OUTPATIENT)
Dept: FAMILY MEDICINE | Facility: CLINIC | Age: 55
End: 2025-06-12
Payer: COMMERCIAL

## 2025-06-12 DIAGNOSIS — E66.01 MORBID OBESITY (H): ICD-10-CM

## 2025-06-12 NOTE — TELEPHONE ENCOUNTER
I spoke with pharmacy staff and told them to cancel the 0.25 mg weekly dosage sent earlier today, and to use the 0.5 mg weekly dosage I just sent today.

## 2025-07-03 ENCOUNTER — MYC REFILL (OUTPATIENT)
Dept: FAMILY MEDICINE | Facility: CLINIC | Age: 55
End: 2025-07-03
Payer: COMMERCIAL

## 2025-07-03 DIAGNOSIS — E66.01 MORBID OBESITY (H): ICD-10-CM

## 2025-08-26 ENCOUNTER — MYC REFILL (OUTPATIENT)
Dept: FAMILY MEDICINE | Facility: CLINIC | Age: 55
End: 2025-08-26
Payer: COMMERCIAL

## 2025-08-26 DIAGNOSIS — E66.813 CLASS 3 SEVERE OBESITY DUE TO EXCESS CALORIES WITHOUT SERIOUS COMORBIDITY WITH BODY MASS INDEX (BMI) OF 50.0 TO 59.9 IN ADULT (H): ICD-10-CM
